# Patient Record
Sex: FEMALE | Race: WHITE | NOT HISPANIC OR LATINO | Employment: OTHER | URBAN - METROPOLITAN AREA
[De-identification: names, ages, dates, MRNs, and addresses within clinical notes are randomized per-mention and may not be internally consistent; named-entity substitution may affect disease eponyms.]

---

## 2018-04-17 ENCOUNTER — HISTORICAL (OUTPATIENT)
Dept: ADMINISTRATIVE | Facility: HOSPITAL | Age: 58
End: 2018-04-17

## 2018-04-17 LAB
ALBUMIN SERPL-MCNC: 3.9 GM/DL (ref 3.4–5)
ALBUMIN/GLOB SERPL: 1.4 {RATIO}
ALP SERPL-CCNC: 50 UNIT/L (ref 38–126)
ALT SERPL-CCNC: 21 UNIT/L (ref 12–78)
AST SERPL-CCNC: 11 UNIT/L (ref 15–37)
BILIRUB SERPL-MCNC: 0.3 MG/DL (ref 0.2–1)
BILIRUBIN DIRECT+TOT PNL SERPL-MCNC: 0.1 MG/DL (ref 0–0.2)
BILIRUBIN DIRECT+TOT PNL SERPL-MCNC: 0.2 MG/DL (ref 0–0.8)
BUN SERPL-MCNC: 20 MG/DL (ref 7–18)
CALCIUM SERPL-MCNC: 8.9 MG/DL (ref 8.5–10.1)
CHLORIDE SERPL-SCNC: 108 MMOL/L (ref 98–107)
CHOLEST SERPL-MCNC: 183 MG/DL (ref 0–200)
CHOLEST/HDLC SERPL: 2.5 {RATIO} (ref 0–4)
CO2 SERPL-SCNC: 29 MMOL/L (ref 21–32)
CREAT SERPL-MCNC: 0.8 MG/DL (ref 0.55–1.02)
DEPRECATED CALCIDIOL+CALCIFEROL SERPL-MC: 42 NG/ML (ref 30–80)
ERYTHROCYTE [DISTWIDTH] IN BLOOD BY AUTOMATED COUNT: 12.4 % (ref 11.5–17)
EST. AVERAGE GLUCOSE BLD GHB EST-MCNC: 108 MG/DL
FT4I SERPL CALC-MCNC: 2.94 (ref 2.6–3.6)
GLOBULIN SER-MCNC: 2.7 GM/DL (ref 2.4–3.5)
GLUCOSE SERPL-MCNC: 82 MG/DL (ref 74–106)
HBA1C MFR BLD: 5.4 % (ref 4.2–6.3)
HCT VFR BLD AUTO: 43.4 % (ref 37–47)
HDLC SERPL-MCNC: 73 MG/DL (ref 35–60)
HGB BLD-MCNC: 14.2 GM/DL (ref 12–16)
LDLC SERPL CALC-MCNC: 99 MG/DL (ref 0–129)
MCH RBC QN AUTO: 33.3 PG (ref 27–31)
MCHC RBC AUTO-ENTMCNC: 32.7 GM/DL (ref 33–36)
MCV RBC AUTO: 101.6 FL (ref 80–94)
PLATELET # BLD AUTO: 226 X10(3)/MCL (ref 130–400)
PMV BLD AUTO: 9.9 FL (ref 9.4–12.4)
POTASSIUM SERPL-SCNC: 4.5 MMOL/L (ref 3.5–5.1)
PROT SERPL-MCNC: 6.6 GM/DL (ref 6.4–8.2)
RBC # BLD AUTO: 4.27 X10(6)/MCL (ref 4.2–5.4)
SODIUM SERPL-SCNC: 141 MMOL/L (ref 136–145)
T3RU NFR SERPL: 31 % (ref 31–39)
T4 SERPL-MCNC: 9.5 MCG/DL (ref 4.7–13.3)
TRIGL SERPL-MCNC: 54 MG/DL (ref 30–150)
TSH SERPL-ACNC: 1.21 MIU/ML (ref 0.36–3.74)
VLDLC SERPL CALC-MCNC: 11 MG/DL
WBC # SPEC AUTO: 3.1 X10(3)/MCL (ref 4.5–11.5)

## 2018-07-27 ENCOUNTER — HISTORICAL (OUTPATIENT)
Dept: CARDIOLOGY | Facility: HOSPITAL | Age: 58
End: 2018-07-27

## 2018-07-27 LAB
ABS NEUT (OLG): 2.46 X10(3)/MCL (ref 2.1–9.2)
ALBUMIN SERPL-MCNC: 3.6 GM/DL (ref 3.4–5)
ALBUMIN/GLOB SERPL: 1.2 {RATIO}
ALP SERPL-CCNC: 61 UNIT/L (ref 38–126)
ALT SERPL-CCNC: 16 UNIT/L (ref 12–78)
AST SERPL-CCNC: 11 UNIT/L (ref 15–37)
BASOPHILS NFR BLD MANUAL: 2 % (ref 0–2)
BILIRUB SERPL-MCNC: 0.4 MG/DL (ref 0.2–1)
BILIRUBIN DIRECT+TOT PNL SERPL-MCNC: 0.1 MG/DL (ref 0–0.2)
BILIRUBIN DIRECT+TOT PNL SERPL-MCNC: 0.3 MG/DL (ref 0–0.8)
BUN SERPL-MCNC: 20 MG/DL (ref 7–18)
CALCIUM SERPL-MCNC: 8.5 MG/DL (ref 8.5–10.1)
CHLORIDE SERPL-SCNC: 108 MMOL/L (ref 98–107)
CHOLEST SERPL-MCNC: 166 MG/DL (ref 0–200)
CHOLEST/HDLC SERPL: 3 {RATIO} (ref 0–4)
CO2 SERPL-SCNC: 27 MMOL/L (ref 21–32)
CREAT SERPL-MCNC: 1 MG/DL (ref 0.55–1.02)
EOSINOPHIL NFR BLD MANUAL: 38 % (ref 0–8)
ERYTHROCYTE [DISTWIDTH] IN BLOOD BY AUTOMATED COUNT: 12 % (ref 11.5–17)
EST. AVERAGE GLUCOSE BLD GHB EST-MCNC: 105 MG/DL
GLOBULIN SER-MCNC: 3 GM/DL (ref 2.4–3.5)
GLUCOSE SERPL-MCNC: 83 MG/DL (ref 74–106)
HBA1C MFR BLD: 5.3 % (ref 4.2–6.3)
HCT VFR BLD AUTO: 42.8 % (ref 37–47)
HDLC SERPL-MCNC: 55 MG/DL (ref 35–60)
HGB BLD-MCNC: 14 GM/DL (ref 12–16)
INR PPP: 1.02 (ref 0–1.27)
LDLC SERPL CALC-MCNC: 99 MG/DL (ref 0–129)
LYMPHOCYTES NFR BLD MANUAL: 21 % (ref 13–40)
MAGNESIUM SERPL-MCNC: 2.1 MG/DL (ref 1.8–2.4)
MCH RBC QN AUTO: 32.8 PG (ref 27–31)
MCHC RBC AUTO-ENTMCNC: 32.7 GM/DL (ref 33–36)
MCV RBC AUTO: 100.2 FL (ref 80–94)
MONOCYTES NFR BLD MANUAL: 3 % (ref 2–11)
NEUTROPHILS NFR BLD MANUAL: 36 % (ref 47–80)
PLATELET # BLD AUTO: 195 X10(3)/MCL (ref 130–400)
PLATELET # BLD EST: NORMAL 10*3/UL
PLATELET # BLD EST: NORMAL 10*3/UL
PMV BLD AUTO: 9.7 FL (ref 7.4–10.4)
POTASSIUM SERPL-SCNC: 4.5 MMOL/L (ref 3.5–5.1)
PROT SERPL-MCNC: 6.6 GM/DL (ref 6.4–8.2)
PROTHROMBIN TIME: 13.7 SECOND(S) (ref 12.2–14.7)
RBC # BLD AUTO: 4.27 X10(6)/MCL (ref 4.2–5.4)
RBC MORPH BLD: NORMAL
SODIUM SERPL-SCNC: 143 MMOL/L (ref 136–145)
TRIGL SERPL-MCNC: 59 MG/DL (ref 30–150)
TSH SERPL-ACNC: 1.32 MIU/L (ref 0.36–3.74)
VLDLC SERPL CALC-MCNC: 12 MG/DL
WBC # SPEC AUTO: 6.9 X10(3)/MCL (ref 4.5–11.5)

## 2018-08-10 ENCOUNTER — HISTORICAL (OUTPATIENT)
Dept: CARDIOLOGY | Facility: HOSPITAL | Age: 58
End: 2018-08-10

## 2018-08-10 LAB
ABS NEUT (OLG): 1.37 X10(3)/MCL (ref 2.1–9.2)
ALBUMIN SERPL-MCNC: 3.6 GM/DL (ref 3.4–5)
ALBUMIN/GLOB SERPL: 1.3 {RATIO}
ALP SERPL-CCNC: 56 UNIT/L (ref 38–126)
ALT SERPL-CCNC: 18 UNIT/L (ref 12–78)
AST SERPL-CCNC: 11 UNIT/L (ref 15–37)
BILIRUB SERPL-MCNC: 0.4 MG/DL (ref 0.2–1)
BILIRUBIN DIRECT+TOT PNL SERPL-MCNC: 0.1 MG/DL (ref 0–0.2)
BILIRUBIN DIRECT+TOT PNL SERPL-MCNC: 0.3 MG/DL (ref 0–0.8)
BUN SERPL-MCNC: 14 MG/DL (ref 7–18)
CALCIUM SERPL-MCNC: 8.7 MG/DL (ref 8.5–10.1)
CHLORIDE SERPL-SCNC: 107 MMOL/L (ref 98–107)
CHOLEST SERPL-MCNC: 164 MG/DL (ref 0–200)
CHOLEST/HDLC SERPL: 3 {RATIO} (ref 0–4)
CO2 SERPL-SCNC: 29 MMOL/L (ref 21–32)
CREAT SERPL-MCNC: 0.82 MG/DL (ref 0.55–1.02)
EOSINOPHIL NFR BLD MANUAL: 19 % (ref 0–8)
ERYTHROCYTE [DISTWIDTH] IN BLOOD BY AUTOMATED COUNT: 12.4 % (ref 11.5–17)
EST. AVERAGE GLUCOSE BLD GHB EST-MCNC: 108 MG/DL
GLOBULIN SER-MCNC: 2.7 GM/DL (ref 2.4–3.5)
GLUCOSE SERPL-MCNC: 92 MG/DL (ref 74–106)
HBA1C MFR BLD: 5.4 % (ref 4.2–6.3)
HCT VFR BLD AUTO: 40.9 % (ref 37–47)
HDLC SERPL-MCNC: 54 MG/DL (ref 35–60)
HGB BLD-MCNC: 13.3 GM/DL (ref 12–16)
INR PPP: 1.01 (ref 0–1.27)
LDLC SERPL CALC-MCNC: 99 MG/DL (ref 0–129)
LYMPHOCYTES NFR BLD MANUAL: 40 % (ref 13–40)
MACROCYTES BLD QL SMEAR: 1
MAGNESIUM SERPL-MCNC: 2.1 MG/DL (ref 1.8–2.4)
MCH RBC QN AUTO: 32.8 PG (ref 27–31)
MCHC RBC AUTO-ENTMCNC: 32.5 GM/DL (ref 33–36)
MCV RBC AUTO: 101 FL (ref 80–94)
MONOCYTES NFR BLD MANUAL: 4 % (ref 2–11)
NEUTROPHILS NFR BLD MANUAL: 37 % (ref 47–80)
PLATELET # BLD AUTO: 228 X10(3)/MCL (ref 130–400)
PLATELET # BLD EST: ADEQUATE 10*3/UL
PLATELET # BLD EST: ADEQUATE 10*3/UL
PMV BLD AUTO: 9.4 FL (ref 7.4–10.4)
POTASSIUM SERPL-SCNC: 4.6 MMOL/L (ref 3.5–5.1)
PROT SERPL-MCNC: 6.3 GM/DL (ref 6.4–8.2)
PROTHROMBIN TIME: 13.6 SECOND(S) (ref 12.2–14.7)
RBC # BLD AUTO: 4.05 X10(6)/MCL (ref 4.2–5.4)
SODIUM SERPL-SCNC: 142 MMOL/L (ref 136–145)
TRIGL SERPL-MCNC: 53 MG/DL (ref 30–150)
TSH SERPL-ACNC: 1.15 MIU/L (ref 0.36–3.74)
VLDLC SERPL CALC-MCNC: 11 MG/DL
WBC # SPEC AUTO: 4.5 X10(3)/MCL (ref 4.5–11.5)

## 2018-08-21 ENCOUNTER — HISTORICAL (OUTPATIENT)
Dept: ADMINISTRATIVE | Facility: HOSPITAL | Age: 58
End: 2018-08-21

## 2018-08-21 LAB
ABS NEUT (OLG): 4.16 X10(3)/MCL (ref 2.1–9.2)
ALBUMIN SERPL-MCNC: 4.3 GM/DL (ref 3.4–5)
ALBUMIN/GLOB SERPL: 1.3 RATIO (ref 1.1–2)
ALP SERPL-CCNC: 79 UNIT/L (ref 38–126)
ALT SERPL-CCNC: 28 UNIT/L (ref 12–78)
AST SERPL-CCNC: 12 UNIT/L (ref 15–37)
BASOPHILS # BLD AUTO: 0 X10(3)/MCL (ref 0–0.2)
BASOPHILS NFR BLD AUTO: 0 %
BILIRUB SERPL-MCNC: 0.6 MG/DL (ref 0.2–1)
BILIRUBIN DIRECT+TOT PNL SERPL-MCNC: 0.2 MG/DL (ref 0–0.5)
BILIRUBIN DIRECT+TOT PNL SERPL-MCNC: 0.4 MG/DL (ref 0–0.8)
BUN SERPL-MCNC: 21 MG/DL (ref 7–18)
CALCIUM SERPL-MCNC: 9.3 MG/DL (ref 8.5–10.1)
CHLORIDE SERPL-SCNC: 102 MMOL/L (ref 98–107)
CO2 SERPL-SCNC: 27 MMOL/L (ref 21–32)
CREAT SERPL-MCNC: 1.09 MG/DL (ref 0.55–1.02)
EOSINOPHIL # BLD AUTO: 0.5 X10(3)/MCL (ref 0–0.9)
EOSINOPHIL NFR BLD AUTO: 7 %
ERYTHROCYTE [DISTWIDTH] IN BLOOD BY AUTOMATED COUNT: 12.2 % (ref 11.5–17)
FERRITIN SERPL-MCNC: 195.9 NG/ML (ref 8–388)
GLOBULIN SER-MCNC: 3.4 GM/DL (ref 2.4–3.5)
GLUCOSE SERPL-MCNC: 69 MG/DL (ref 74–106)
HCT VFR BLD AUTO: 45.9 % (ref 37–47)
HGB BLD-MCNC: 15 GM/DL (ref 12–16)
IRON SATN MFR SERPL: 36.1 % (ref 20–50)
IRON SERPL-MCNC: 113 MCG/DL (ref 50–175)
LYMPHOCYTES # BLD AUTO: 1.6 X10(3)/MCL (ref 0.6–4.6)
LYMPHOCYTES NFR BLD AUTO: 24 %
MCH RBC QN AUTO: 33 PG (ref 27–31)
MCHC RBC AUTO-ENTMCNC: 32.7 GM/DL (ref 33–36)
MCV RBC AUTO: 101.1 FL (ref 80–94)
MONOCYTES # BLD AUTO: 0.3 X10(3)/MCL (ref 0.1–1.3)
MONOCYTES NFR BLD AUTO: 5 %
NEUTROPHILS # BLD AUTO: 4.16 X10(3)/MCL (ref 1.4–7.9)
NEUTROPHILS NFR BLD AUTO: 63 %
PLATELET # BLD AUTO: 310 X10(3)/MCL (ref 130–400)
PMV BLD AUTO: 9.3 FL (ref 9.4–12.4)
POTASSIUM SERPL-SCNC: 4.6 MMOL/L (ref 3.5–5.1)
PROT SERPL-MCNC: 7.7 GM/DL (ref 6.4–8.2)
RBC # BLD AUTO: 4.54 X10(6)/MCL (ref 4.2–5.4)
SODIUM SERPL-SCNC: 138 MMOL/L (ref 136–145)
TIBC SERPL-MCNC: 313 MCG/DL (ref 250–450)
TRANSFERRIN SERPL-MCNC: 261 MG/DL (ref 200–360)
WBC # SPEC AUTO: 6.6 X10(3)/MCL (ref 4.5–11.5)

## 2021-03-23 ENCOUNTER — HISTORICAL (OUTPATIENT)
Dept: ADMINISTRATIVE | Facility: HOSPITAL | Age: 61
End: 2021-03-23

## 2021-03-23 LAB
ABS NEUT (OLG): 1.51 X10(3)/MCL (ref 2.1–9.2)
ALBUMIN SERPL-MCNC: 3.9 GM/DL (ref 3.4–4.8)
ALBUMIN/GLOB SERPL: 1.6 RATIO (ref 1.1–2)
ALP SERPL-CCNC: 84 UNIT/L (ref 40–150)
ALT SERPL-CCNC: 16 UNIT/L (ref 0–55)
AST SERPL-CCNC: 14 UNIT/L (ref 5–34)
BASOPHILS # BLD AUTO: 0 X10(3)/MCL (ref 0–0.2)
BASOPHILS NFR BLD AUTO: 1 %
BILIRUB SERPL-MCNC: 0.5 MG/DL
BILIRUBIN DIRECT+TOT PNL SERPL-MCNC: 0.2 MG/DL (ref 0–0.5)
BILIRUBIN DIRECT+TOT PNL SERPL-MCNC: 0.3 MG/DL (ref 0–0.8)
BUN SERPL-MCNC: 14.6 MG/DL (ref 9.8–20.1)
CALCIUM SERPL-MCNC: 8.9 MG/DL (ref 8.4–10.2)
CHLORIDE SERPL-SCNC: 107 MMOL/L (ref 98–107)
CO2 SERPL-SCNC: 28 MMOL/L (ref 23–31)
CREAT SERPL-MCNC: 0.84 MG/DL (ref 0.55–1.02)
CRP SERPL-MCNC: 0.24 MG/DL
EOSINOPHIL # BLD AUTO: 0.1 X10(3)/MCL (ref 0–0.9)
EOSINOPHIL NFR BLD AUTO: 3 %
ERYTHROCYTE [DISTWIDTH] IN BLOOD BY AUTOMATED COUNT: 13.3 % (ref 11.5–17)
ERYTHROCYTE [SEDIMENTATION RATE] IN BLOOD: 16 MM/HR (ref 0–20)
GLOBULIN SER-MCNC: 2.4 GM/DL (ref 2.4–3.5)
GLUCOSE SERPL-MCNC: 84 MG/DL (ref 82–115)
HCT VFR BLD AUTO: 41.8 % (ref 37–47)
HGB BLD-MCNC: 13.2 GM/DL (ref 12–16)
HIV 1+2 AB+HIV1 P24 AG SERPL QL IA: NONREACTIVE
IRON SERPL-MCNC: 150 UG/DL (ref 50–170)
LYMPHOCYTES # BLD AUTO: 1.1 X10(3)/MCL (ref 0.6–4.6)
LYMPHOCYTES NFR BLD AUTO: 36 %
MCH RBC QN AUTO: 31.1 PG (ref 27–31)
MCHC RBC AUTO-ENTMCNC: 31.6 GM/DL (ref 33–36)
MCV RBC AUTO: 98.6 FL (ref 80–94)
MONOCYTES # BLD AUTO: 0.3 X10(3)/MCL (ref 0.1–1.3)
MONOCYTES NFR BLD AUTO: 11 %
NEUTROPHILS # BLD AUTO: 1.51 X10(3)/MCL (ref 2.1–9.2)
NEUTROPHILS NFR BLD AUTO: 49 %
PLATELET # BLD AUTO: 273 X10(3)/MCL (ref 130–400)
PMV BLD AUTO: 10.3 FL (ref 9.4–12.4)
POTASSIUM SERPL-SCNC: 4 MMOL/L (ref 3.5–5.1)
PROT SERPL-MCNC: 6.3 GM/DL (ref 5.8–7.6)
RBC # BLD AUTO: 4.24 X10(6)/MCL (ref 4.2–5.4)
SODIUM SERPL-SCNC: 143 MMOL/L (ref 136–145)
T PALLIDUM AB SER QL: NONREACTIVE
T3FREE SERPL-MCNC: 2.7 PG/ML (ref 1.71–3.71)
T4 FREE SERPL-MCNC: 0.98 NG/DL (ref 0.7–1.48)
TSH SERPL-ACNC: 1.1 UIU/ML (ref 0.35–4.94)
WBC # SPEC AUTO: 3.1 X10(3)/MCL (ref 4.5–11.5)

## 2021-03-24 ENCOUNTER — HISTORICAL (OUTPATIENT)
Dept: ADMINISTRATIVE | Facility: HOSPITAL | Age: 61
End: 2021-03-24

## 2021-04-01 ENCOUNTER — HISTORICAL (OUTPATIENT)
Dept: ADMINISTRATIVE | Facility: HOSPITAL | Age: 61
End: 2021-04-01

## 2021-04-01 LAB
APPEARANCE, UA: CLEAR
BACTERIA SPEC CULT: ABNORMAL /HPF
BILIRUB UR QL STRIP: NEGATIVE
COLOR UR: YELLOW
GLUCOSE (UA): NEGATIVE
HGB UR QL STRIP: NEGATIVE
KETONES UR QL STRIP: NEGATIVE
LEUKOCYTE ESTERASE UR QL STRIP: ABNORMAL
NITRITE UR QL STRIP: NEGATIVE
PH UR STRIP: 5 [PH] (ref 5–9)
PROT UR QL STRIP: NEGATIVE
RBC #/AREA URNS HPF: ABNORMAL /[HPF]
SP GR UR STRIP: 1.02 (ref 1–1.03)
SQUAMOUS EPITHELIAL, UA: ABNORMAL /HPF (ref 0–4)
UROBILINOGEN UR STRIP-ACNC: 0.2
WBC #/AREA URNS HPF: 11 /HPF (ref 0–3)

## 2021-04-20 ENCOUNTER — HISTORICAL (OUTPATIENT)
Dept: RADIOLOGY | Facility: HOSPITAL | Age: 61
End: 2021-04-20

## 2021-05-10 ENCOUNTER — HISTORICAL (OUTPATIENT)
Dept: ADMINISTRATIVE | Facility: HOSPITAL | Age: 61
End: 2021-05-10

## 2021-05-10 LAB
ABS NEUT (OLG): 7.34 X10(3)/MCL (ref 2.1–9.2)
ALBUMIN SERPL-MCNC: 3.8 GM/DL (ref 3.4–4.8)
ALBUMIN/GLOB SERPL: 1.5 RATIO (ref 1.1–2)
ALP SERPL-CCNC: 99 UNIT/L (ref 40–150)
ALT SERPL-CCNC: 16 UNIT/L (ref 0–55)
AST SERPL-CCNC: 13 UNIT/L (ref 5–34)
BASOPHILS # BLD AUTO: 0 X10(3)/MCL (ref 0–0.2)
BASOPHILS NFR BLD AUTO: 0 %
BILIRUB SERPL-MCNC: 0.4 MG/DL
BILIRUBIN DIRECT+TOT PNL SERPL-MCNC: 0.2 MG/DL (ref 0–0.5)
BILIRUBIN DIRECT+TOT PNL SERPL-MCNC: 0.2 MG/DL (ref 0–0.8)
BUN SERPL-MCNC: 15.3 MG/DL (ref 9.8–20.1)
CALCIUM SERPL-MCNC: 9.2 MG/DL (ref 8.4–10.2)
CHLORIDE SERPL-SCNC: 106 MMOL/L (ref 98–107)
CO2 SERPL-SCNC: 25 MMOL/L (ref 23–31)
CREAT SERPL-MCNC: 1 MG/DL (ref 0.55–1.02)
EOSINOPHIL # BLD AUTO: 0 X10(3)/MCL (ref 0–0.9)
EOSINOPHIL NFR BLD AUTO: 0 %
ERYTHROCYTE [DISTWIDTH] IN BLOOD BY AUTOMATED COUNT: 13 % (ref 11.5–17)
GLOBULIN SER-MCNC: 2.5 GM/DL (ref 2.4–3.5)
GLUCOSE SERPL-MCNC: 120 MG/DL (ref 82–115)
HCT VFR BLD AUTO: 40.3 % (ref 37–47)
HGB BLD-MCNC: 13 GM/DL (ref 12–16)
LIPASE SERPL-CCNC: 14 U/L
LYMPHOCYTES # BLD AUTO: 0.9 X10(3)/MCL (ref 0.6–4.6)
LYMPHOCYTES NFR BLD AUTO: 10 %
MCH RBC QN AUTO: 31.5 PG (ref 27–31)
MCHC RBC AUTO-ENTMCNC: 32.3 GM/DL (ref 33–36)
MCV RBC AUTO: 97.6 FL (ref 80–94)
MONOCYTES # BLD AUTO: 0.4 X10(3)/MCL (ref 0.1–1.3)
MONOCYTES NFR BLD AUTO: 4 %
NEUTROPHILS # BLD AUTO: 7.34 X10(3)/MCL (ref 2.1–9.2)
NEUTROPHILS NFR BLD AUTO: 84 %
PLATELET # BLD AUTO: 289 X10(3)/MCL (ref 130–400)
PMV BLD AUTO: 10.5 FL (ref 9.4–12.4)
POTASSIUM SERPL-SCNC: 4.1 MMOL/L (ref 3.5–5.1)
PROT SERPL-MCNC: 6.3 GM/DL (ref 5.8–7.6)
RBC # BLD AUTO: 4.13 X10(6)/MCL (ref 4.2–5.4)
SODIUM SERPL-SCNC: 140 MMOL/L (ref 136–145)
TROPONIN I SERPL-MCNC: <0.01 NG/ML (ref 0–0.04)
WBC # SPEC AUTO: 8.7 X10(3)/MCL (ref 4.5–11.5)

## 2021-05-11 ENCOUNTER — HOSPITAL ENCOUNTER (OUTPATIENT)
Dept: MEDSURG UNIT | Facility: HOSPITAL | Age: 61
End: 2021-05-12
Attending: COLON & RECTAL SURGERY | Admitting: COLON & RECTAL SURGERY

## 2021-05-11 LAB
ALBUMIN SERPL-MCNC: 3.5 GM/DL (ref 3.4–4.8)
ALBUMIN/GLOB SERPL: 1.2 RATIO (ref 1.1–2)
ALP SERPL-CCNC: 81 UNIT/L (ref 40–150)
ALT SERPL-CCNC: 16 UNIT/L (ref 0–55)
AST SERPL-CCNC: 17 UNIT/L (ref 5–34)
BILIRUB SERPL-MCNC: 0.7 MG/DL
BILIRUBIN DIRECT+TOT PNL SERPL-MCNC: 0.2 MG/DL (ref 0–0.5)
BILIRUBIN DIRECT+TOT PNL SERPL-MCNC: 0.5 MG/DL (ref 0–0.8)
BUN SERPL-MCNC: 8.5 MG/DL (ref 9.8–20.1)
CALCIUM SERPL-MCNC: 9 MG/DL (ref 8.4–10.2)
CHLORIDE SERPL-SCNC: 103 MMOL/L (ref 98–107)
CO2 SERPL-SCNC: 23 MMOL/L (ref 23–31)
CREAT SERPL-MCNC: 0.76 MG/DL (ref 0.55–1.02)
GLOBULIN SER-MCNC: 3 GM/DL (ref 2.4–3.5)
GLUCOSE SERPL-MCNC: 155 MG/DL (ref 82–115)
LIPASE SERPL-CCNC: 12 U/L
POTASSIUM SERPL-SCNC: 3.6 MMOL/L (ref 3.5–5.1)
PROT SERPL-MCNC: 6.5 GM/DL (ref 5.8–7.6)
SARS-COV-2 AG RESP QL IA.RAPID: NEGATIVE
SODIUM SERPL-SCNC: 135 MMOL/L (ref 136–145)

## 2021-05-12 LAB
ABS NEUT (OLG): 4.79 X10(3)/MCL (ref 2.1–9.2)
ALBUMIN SERPL-MCNC: 3 GM/DL (ref 3.4–4.8)
ALBUMIN/GLOB SERPL: 1.4 RATIO (ref 1.1–2)
ALP SERPL-CCNC: 89 UNIT/L (ref 40–150)
ALT SERPL-CCNC: 126 UNIT/L (ref 0–55)
AST SERPL-CCNC: 84 UNIT/L (ref 5–34)
BASOPHILS # BLD AUTO: 0 X10(3)/MCL (ref 0–0.2)
BASOPHILS NFR BLD AUTO: 0 %
BILIRUB SERPL-MCNC: 0.3 MG/DL
BILIRUBIN DIRECT+TOT PNL SERPL-MCNC: 0.1 MG/DL (ref 0–0.8)
BILIRUBIN DIRECT+TOT PNL SERPL-MCNC: 0.2 MG/DL (ref 0–0.5)
BUN SERPL-MCNC: 8.8 MG/DL (ref 9.8–20.1)
CALCIUM SERPL-MCNC: 8.2 MG/DL (ref 8.4–10.2)
CHLORIDE SERPL-SCNC: 108 MMOL/L (ref 98–107)
CO2 SERPL-SCNC: 29 MMOL/L (ref 23–31)
CREAT SERPL-MCNC: 0.78 MG/DL (ref 0.55–1.02)
EOSINOPHIL # BLD AUTO: 0.1 X10(3)/MCL (ref 0–0.9)
EOSINOPHIL NFR BLD AUTO: 1 %
ERYTHROCYTE [DISTWIDTH] IN BLOOD BY AUTOMATED COUNT: 13.5 % (ref 11.5–17)
GLOBULIN SER-MCNC: 2.1 GM/DL (ref 2.4–3.5)
GLUCOSE SERPL-MCNC: 100 MG/DL (ref 82–115)
HCT VFR BLD AUTO: 35.4 % (ref 37–47)
HGB BLD-MCNC: 11.4 GM/DL (ref 12–16)
LYMPHOCYTES # BLD AUTO: 1.5 X10(3)/MCL (ref 0.6–4.6)
LYMPHOCYTES NFR BLD AUTO: 22 %
MCH RBC QN AUTO: 31.8 PG (ref 27–31)
MCHC RBC AUTO-ENTMCNC: 32.2 GM/DL (ref 33–36)
MCV RBC AUTO: 98.9 FL (ref 80–94)
MONOCYTES # BLD AUTO: 0.5 X10(3)/MCL (ref 0.1–1.3)
MONOCYTES NFR BLD AUTO: 8 %
NEUTROPHILS # BLD AUTO: 4.79 X10(3)/MCL (ref 2.1–9.2)
NEUTROPHILS NFR BLD AUTO: 69 %
PLATELET # BLD AUTO: 224 X10(3)/MCL (ref 130–400)
PMV BLD AUTO: 10.4 FL (ref 9.4–12.4)
POTASSIUM SERPL-SCNC: 3.4 MMOL/L (ref 3.5–5.1)
PROT SERPL-MCNC: 5.1 GM/DL (ref 5.8–7.6)
RBC # BLD AUTO: 3.58 X10(6)/MCL (ref 4.2–5.4)
SODIUM SERPL-SCNC: 140 MMOL/L (ref 136–145)
WBC # SPEC AUTO: 7 X10(3)/MCL (ref 4.5–11.5)

## 2021-06-16 ENCOUNTER — HISTORICAL (OUTPATIENT)
Dept: ADMINISTRATIVE | Facility: HOSPITAL | Age: 61
End: 2021-06-16

## 2021-06-16 LAB
CHOLEST SERPL-MCNC: 237 MG/DL
CHOLEST/HDLC SERPL: 4 {RATIO} (ref 0–5)
DEPRECATED CALCIDIOL+CALCIFEROL SERPL-MC: 40.5 NG/ML (ref 30–80)
FOLATE SERPL-MCNC: 5.6 NG/ML (ref 7–31.4)
HDLC SERPL-MCNC: 60 MG/DL (ref 35–60)
LDLC SERPL CALC-MCNC: 152 MG/DL (ref 50–140)
TRIGL SERPL-MCNC: 127 MG/DL (ref 37–140)
VIT B12 SERPL-MCNC: 369 PG/ML (ref 213–816)
VLDLC SERPL CALC-MCNC: 25 MG/DL

## 2021-09-09 ENCOUNTER — HISTORICAL (OUTPATIENT)
Dept: ADMINISTRATIVE | Facility: HOSPITAL | Age: 61
End: 2021-09-09

## 2021-09-09 LAB
FOLATE SERPL-MCNC: 12.9 NG/ML (ref 7–31.4)
VIT B12 SERPL-MCNC: >2000 PG/ML (ref 213–816)

## 2022-04-07 ENCOUNTER — HISTORICAL (OUTPATIENT)
Dept: ADMINISTRATIVE | Facility: HOSPITAL | Age: 62
End: 2022-04-07
Payer: COMMERCIAL

## 2022-04-24 VITALS
OXYGEN SATURATION: 97 % | HEIGHT: 66 IN | BODY MASS INDEX: 31.57 KG/M2 | DIASTOLIC BLOOD PRESSURE: 82 MMHG | WEIGHT: 196.44 LBS | SYSTOLIC BLOOD PRESSURE: 118 MMHG

## 2022-04-30 NOTE — DISCHARGE SUMMARY
DISCHARGE DATE:  07/27/2018    HOSPITAL COURSE:  Mrs. Cunningham was admitted for elective bilateral lower extremity venography with intravascular ultrasonography to further evaluate her for evidence of iliac vein compression syndrome.  As outlined in the operative report, she underwent an uneventful bilateral lower extremity venography procedure with intravascular ultrasonography, and was found to have significant ostial left common iliac vein compression, and only mild to moderate bilateral external iliac vein compression.  Postoperative course is being monitored closely in preparation for discharge home later today.    DISCHARGE DIAGNOSES:    1. 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  2. Mild to moderate bilateral external iliac vein compression.  3. Chronic venous hypertension.  4. Venous intermittent claudication.  5. Edema.  6. Chronic venous insufficiency CEAP classification 3/4.  7. Symptomatic varicose veins and spider veins of the bilateral lower extremities.    RECOMMENDATIONS:  Mrs. Cunningham will be arranged for discharge home today. In the meantime, will be continuing on the same medications as on admission, and I have asked her to call or return should she have further problems or complaints.  I will be checking on her progress in my office in the very near future.  We will continue to monitor her progress closely and titrate medical therapies as tolerated.  We will also be discussing a conservative approach versus simultaneous common iliac vein percutaneous transluminal venoplasty.  With the severity of the compression of the ostium of the left common iliac vein, stenting of this lesion would be suboptimal without simultaneous stenting of the right common iliac vein.  Will have further discussions in the office and further recommendations forthcoming.        ______________________________  Boris Dubois DO    CT/US  DD:  07/27/2018  Time:  09:21AM  DT:  07/27/2018  Time:  09:45AM  Job #:   [id number]

## 2022-04-30 NOTE — OP NOTE
DATE OF SURGERY:        SURGEON:  Boris Dubois DO    PREOPERATIVE DIAGNOSES:    1. 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  2. Moderate right iliac vein compression.  3. Chronic peripheral edema.  4. Chronic venous hypertension.  5. Venous intermittent claudication of the bilateral extremities.    POSTOPERATIVE DIAGNOSES:    1. 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  2. Moderate right iliac vein compression.  3. Chronic peripheral edema.  4. Chronic venous hypertension.  5. Venous intermittent claudication of the bilateral extremities.    PROCEDURE:    1. Selective bilateral lower extremity venography with venography of the inferior vena cava.  2. Simultaneous bilateral common iliac vein stenting.    COMPLICATIONS:  None.    ESTIMATED BLOOD LOSS:  None.    CONTRAST UTILIZED:  Approximately 20 cc.    HEPARIN USE:  4200 cc.    DESCRIPTION OF PROCEDURE:  Mrs. Cunningham was brought to the cardiac catheterization laboratory for the above-outlined procedures.  Informed consent was obtained.  Based on a recent bilateral extremity venogram with intravascular ultrasonography, the patient was known to have severe ostial left common iliac vein compression in the range of 80% and probable moderately severe right iliac vein compression in the range of 48%.  Repeat bilateral lower extremity venography confirmed the location of the bilateral iliac vein compression.  Generous collateral flow was seen to traverse around the ostial left common iliac vein lesion and across to the right common iliac vein.  The inferior vena cava was otherwise widely patent.  Both iliac veins underwent balloon angioplasty using a 16 x 60 mm balloon.  We then successfully and simultaneously deployed 20 x 80 mm Wallstents across the ostia of the bilateral common iliac veins and extending into the distal inferior vena cava.  The stents were post dilated with a 16 mm balloon and subsequent venograms revealed widely patent iliac vein  stents and inferior vena cava.  With this, the procedure was completed successfully.  The patient tolerated the procedure rather well and left the cardiac catheterization laboratory in stable condition.        ______________________________  DO ERMA Donovan/UY  DD:  08/10/2018  Time:  05:34PM  DT:  08/10/2018  Time:  08:57PM  Job #:  [id number]    cc: Boris Dubois DO

## 2022-04-30 NOTE — DISCHARGE SUMMARY
DISCHARGE DATE:  08/10/2018    HOSPITAL COURSE:  Ms Cunningham was admitted for further evaluation and management of known significant bilateral iliac vein compression noted on recent studies.  As outlined in the operative report, she underwent successful simultaneous percutaneous transluminal venoplasty and stenting of the bilateral common iliac veins extending into the distal inferior vena cava, and the postoperative course so far has been essentially unremarkable and she is being prepared for discharge home later today.    DISCHARGE DIAGNOSES:    1. 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  2. Moderately severe right common iliac vein compression.  3. Status post successful simultaneous bilateral iliac vein stenting into the distal inferior vena cava.    RECOMMENDATIONS:  Mrs. Cunningham will be monitored closely in preparation for discharge home later this evening, if she remains in stable condition.  She will otherwise continue on the same medications as on admission and, in addition, she will be on Plavix and Eliquis daily.  She will be given p.r.n. medications to help with her comfort as she heals from the stent placement.  I have asked her to call or return should she have any further problems or complaints.  In the meantime, to continue on the same medications as we arrange for close outpatient followup evaluation to titrate her medical therapies as tolerated.  Further recommendations forthcoming.        ______________________________  Boris Dubois DO    CT/QAMAR  DD:  08/10/2018  Time:  05:36PM  DT:  08/10/2018  Time:  06:32PM  Job #:  [id number]    cc: Boris Dubois DO

## 2022-04-30 NOTE — ED PROVIDER NOTES
Patient:   Selena Cunningham            MRN: [id number]            FIN: [id number]               Age:   60 years     Sex:  Female     :  1960   Associated Diagnoses:   Acute periumbilical pain   Author:   David Morton      Basic Information   Time seen: Date & time 2021 04:20:00.   History source: Patient.   History limitation: None.   Additional information: Chief Complaint from Nursing Triage Note : Chief Complaint   2021 3:56 CDT       Chief Complaint           Pt. C/o abd pain.. reports seeing pcp yesterday and is scheduled for US mila.. reports pain became worse this monring .. denies n/v/d.. hx of stents    5/10/2021 11:00 CDT      Chief Complaint           pain underneath breast    5/10/2021 10:59 CDT      Chief Complaint           pain underneath breast  .      History of Present Illness   The patient presents with abdominal pain and Epigastric and periumbilical abdominal pain sharp without radiation associated with nausea and vomiting.  She has been constipated the last few time she went to the bathroom.  She did have 2 bowel movements today but states constipated.  Pain started the night after Mother's Day.  That night she had eaten some wings.  She went and saw her primary doctor yesterday had blood work ordered plan was to get an ultrasound of the abdomen today but she has not yet gotten that.  Not take any pain medications currently.  The onset was 1  days ago.  The course/duration of symptoms is fluctuating in intensity.  The character of symptoms is sharp.  The degree at onset was moderate.        Review of Systems   Constitutional symptoms:  Negative except as documented in HPI.   Skin symptoms:  Negative except as documented in HPI.   Eye symptoms:  Negative except as documented in HPI.   ENMT symptoms:  Negative except as documented in HPI.   Respiratory symptoms:  Negative except as documented in HPI.   Cardiovascular symptoms:  Negative except as documented in HPI.    Gastrointestinal symptoms:  Negative except as documented in HPI.   Genitourinary symptoms:  Negative except as documented in HPI.   Musculoskeletal symptoms:  Negative except as documented in HPI.   Neurologic symptoms:  Negative except as documented in HPI.      Health Status   Allergies:    Allergic Reactions (Selected)  Severity Not Documented  Penicillin- Unk.,    Allergies (1) Active Reaction  penicillin unk  .   Medications:  (Selected)   Inpatient Medications  Ordered  Benadryl: 50 mg, form: Cap, Oral, Once, first dose 07/27/18 10:00:00 CDT, stop date 07/27/18 10:00:00 CDT, on call to cath lab  Valium 5 mg oral tablet: 5 mg, form: Tab, Oral, Once, first dose 07/27/18 10:00:00 CDT, stop date 07/27/18 10:00:00 CDT, on call to cath lab  Prescriptions  Prescribed  Bentyl 10 mg oral capsule: 10 mg = 1 cap(s), Oral, QID, PRN PRN cramps, # 40 cap(s), 0 Refill(s), Pharmacy: [facility]/pharmacy #[id number], 165.5, cm, Height/Length Dosing, 05/10/21 11:03:00 CDT, 87.3, kg, Weight Dosing, 05/10/21 11:03:00 CDT  Carafate 1 g oral tablet: 1 gm = 1 tab(s), Oral, QID, # 40 tab(s), 0 Refill(s), Pharmacy: [facility]/pharmacy #[id number], 165.5, cm, Height/Length Dosing, 05/10/21 11:03:00 CDT, 87.3, kg, Weight Dosing, 05/10/21 11:03:00 CDT  Documented Medications  Documented  BUPROPION HCL  MG TB24: Daily  BUPROPION HCL  MG TB24: Daily  D3 1000 (25 mcg) oral tablet: 1,000 IntUnit = 1 tab(s), Oral, Daily, # 30 tab(s), 0 Refill(s)  Dulera 200 mcg-5 mcg/inh inhalation aerosol: 2 puff(s), INH, BID, pt states she takes prn sob, 0 Refill(s)  Pantoprazole 40 mg ORAL EC-Tablet: 40 mg = 1 tab(s), Oral, Daily, # 30 tab(s), 0 Refill(s)  TRINTELLIX 20 MG TABS: Daily  VYVANSE 70 MG CAPS: Daily, 0 Refill(s)  Ventolin HFA 90 mcg/inh inhalation aerosol: pt states she takes prn sob, 0 Refill(s)  amphetamine-dextroamphetamine 20 mg oral capsule, extended release: 20 mg = 1 cap(s), Oral, Once, 0 Refill(s)  clonazePAM 1 mg oral tablet: 1 mg = 1 tab(s),  Oral, TID, 0 Refill(s)  clopidogrel 75 mg oral tablet: 75 mg = 1 tab(s), Oral, Daily, # 30 tab(s), 0 Refill(s)  mirtazapine 15 mg oral tablet: 15 mg = 1 tab(s), Oral, qPM.      Past Medical/ Family/ Social History   Medical history:    Resolved  Asthma (845462099):  Resolved.  GERD - Gastro-esophageal reflux disease (8825227654):  Resolved.  Depression (545283921):  Resolved.  Anxiety (68520786):  Resolved.  ADHD - Attention deficit disorder with hyperactivity (1140093461):  Resolved.  Dementia (29133764):  Resolved..   Surgical history:    hemorrhoid removal.  Rhinoplasty (3268742285).  Tonsillectomy (894996019).  Hysterectomy (419229024).  Appendectomy (756531682)..   Family history:    Diabetes mellitus type 2  Mother  Stroke  Brother  Acute myocardial infarction.  Father  Brother  Congestive heart disease.  Father  Brother  Asthma.  Mother  Hypertension.  Father  Brother  .   Social history:    Social & Psychosocial Habits    Alcohol  07/21/2020  Use: Never    Employment/School  07/21/2020  Status: Disabled    Home/Environment  07/21/2020  Lives with: Children, Spouse    Nutrition/Health  07/21/2020  Home Diet Regular    Sexual  07/21/2020  Sexually active: Yes    Substance Use  07/21/2020  Use: Never    Tobacco  05/10/2021  Use: Former smoker, quit more    Patient Wants Consult For Cessation Counseling No    Started at age: 16 Years    Stopped at age: 50 Years    Abuse/Neglect  05/10/2021  SHX Any signs of abuse or neglect No    Spiritual/Cultural  07/21/2020  Buddhism Preference Nondenominational      07/21/2020  Branch of  Never in   .   Problem list:    Active Problems (8)  Aphthous ulcer of mouth   Asthma   Epigastric pain   Fatigue   GERD - Gastro-esophageal reflux disease   Macrocytosis   Obesity   Recurrent aphthous ulcer   .      Physical Examination               Vital Signs   Vital Signs   5/11/2021 3:56 CDT       Temperature Oral          36.7 DegC                              Temperature Oral (calculated)             98.06 DegF                             Peripheral Pulse Rate     64 bpm                             Respiratory Rate          16 br/min                             SpO2                      100 %                             Systolic Blood Pressure   124 mmHg                             Diastolic Blood Pressure  72 mmHg    5/10/2021 11:00 CDT      Temperature Temporal Artery               36.1 DegC  LOW                             Peripheral Pulse Rate     56 bpm  LOW                             SpO2                      99 %                             Oxygen Therapy            Room air                             Systolic Blood Pressure   150 mmHg  HI                             Diastolic Blood Pressure  96 mmHg  HI                             Blood Pressure Location   Right arm                             Manual Cuff BP            Yes  .      Vital Signs (last 24 hrs)_____  Last Charted___________  Temp Oral     36.7 DegC  (MAY 11 03:56)  Heart Rate Peripheral   64 bpm  (MAY 11 03:56)  Resp Rate         16 br/min  (MAY 11 03:56)  SBP      124 mmHg  (MAY 11 03:56)  DBP      72 mmHg  (MAY 11 03:56)  SpO2      100 %  (MAY 11 03:56)  .   Oxygen saturation.   General:  Alert, moderate distress.    Skin:  Warm, dry.    Head:  Normocephalic.   Eye   Cardiovascular:  Regular rate and rhythm, Normal peripheral perfusion.    Respiratory:  Lungs are clear to auscultation, respirations are non-labored, breath sounds are equal, Symmetrical chest wall expansion.    Gastrointestinal:  Soft, Non distended, Normal bowel sounds, No organomegaly, Pain is epigastric on palpation there is no guarding there is no rebound no pain on deep palpation of the right upper quadrant, Tenderness: Mild, epigastric, Guarding: Negative, Rebound: Negative.    Musculoskeletal:  No deformity.   Neurological:  Alert and oriented to person, place, time, and situation, No focal neurological deficit observed,  normal speech observed.    Psychiatric:  Cooperative, appropriate mood & affect.       Medical Decision Making   Differential Diagnosis:  Abdominal pain, renal stone, ureteral stone, urinary tract infection, pyelonephritis, gastroenteritis, Musculoskeletal pain.    Electrocardiogram:  Time 5/11/2021 04:11:00, rate 72, normal sinus rhythm, EP Interp, Normal axis, Incomplete right bundle branch block.  No change from 3/15/2020.    Radiology results:  Rad Results (ST)  < 12 hrs   Accession: [id number]  Order: CT Abdomen and Pelvis W Contrast  Report Dt/Tm: 05/11/2021 06:07  Report:   CT ABDOMEN AND PELVIS WITH CONTRAST:     Intravenous contrast enhancement and 2-D reformations     HISTORY: Abdominal Pain  As per PQRS measures, all CT scans at this facility used dose  modulation, iterative reconstruction, and/or weight based dose  adjustment when appropriate to reduce radiation dose to as low as  reasonably achievable.     FINDINGS:The liver and spleen normal size. There is mild  pericholecystic fluid or stranding. Pancreas adrenal glands  unremarkable. Kidneys without hydronephrosis or acute finding.     Urinary bladder partially distended unremarkable and there are  presumed surgical changes in the pelvis.     No obstructive bowel findings. Small hiatal hernia likely. No signs of  appendicitis.     Bilateral iliac vein stents noted.     IMPRESSION:  Pericholecystic fluid or stranding likely for which further evaluation  with sonography may be helpful and other details above         .      Impression and Plan   Diagnosis   Acute periumbilical pain (OYM70-TA R10.33)      Calls-Consults   -  5/11/2021 07:44:00 , Surgical Hospitalist, phone call, Will come by to asses patient .    -  5/11/2021 09:11:00 , Surgery, phone call, Will take patient .    Plan   Disposition: Patient care transitioned to: Crys SUTTON, Stoney VILLAREAL    Counseled: Patient, Regarding diagnosis, Regarding diagnostic results, Regarding treatment plan,  Patient indicated understanding of instructions.    Notes:   I, David Morton, acted solely as a scribe for and in the presence of Dr. Spangler who performed the service..

## 2022-04-30 NOTE — OP NOTE
DATE OF SURGERY:    05/11/2021    SURGEON:  Pierre Ramirez MD  ASSISTANT:  Elpidio Spear MD    PREOPERATIVE DIAGNOSIS:  Cholelithiasis with acute cholecystitis.    POSTOPERATIVE DIAGNOSIS:  Cholelithiasis with acute cholecystitis.    PROCEDURE:  Laparoscopic cholecystectomy.    ANESTHESIA:  General.    ESTIMATED BLOOD LOSS:  30 cc.    SPECIMEN:  Gallbladder.    COMPLICATIONS:  None.    PROCEDURE IN DETAIL:  After informed consent was obtained, the patient was brought to the operating room, placed in the supine position.  Next, general endotracheal anesthesia was administered by the anesthesia team.  The abdomen was prepped and draped in sterile surgical fashion.  A small curvilinear supraumbilical incision was made with a 15 blade.  Incision was deepened with electrocautery.  The fascia was divided vertically in the midline with a 15 blade and a 10 mm balloon Ramu trocar was introduced.  Pneumoperitoneum was achieved.  The patient was placed in reverse Trendelenburg left side down position.  A 10 mm 0 degree laparoscope was then used to place 3 additional 5 mm working trocars along the right costal margin.  Gallbladder was noted to be gangrenous.  It was too tense to be grasped, so it was decompressed laparoscopically.  It was then grasped at the fundus and retracted cephalad toward the right shoulder.  A second grasper was used to grasp the infundibulum and flag the gallbladder.  The peritoneum was incised with electrocautery and stripped down, thus exposing Calot triangle.  Careful dissection within the triangle was performed.  Cystic duct and cystic artery were identified, skeletonized, and isolated.  Posterior aspect of the triangle was cleared to the liver thus creating the critical view.  Cystic duct was then divided with Endo Eirch between surgical clips.  Cystic artery was divided similarly.  Gallbladder was then excised from the gallbladder fossa using the hook electrocautery.  Once excision  was completed, gallbladder was placed within an EndoCatch retrieval bag, which was left in the abdominal cavity until the end of the case.  Hemostasis within the gallbladder fossa was achieved with spot cautery.  Fibrillar was used as well.  The right upper quadrant was irrigated and suctioned.  Hemostasis was ascertained.  The patient was returned to the neutral position.  Working trocars were removed and pneumoperitoneum was released.  Finally the umbilical trocar was removed.  The EndoCatch retrieval bag containing the gallbladder was removed and sent for permanent pathology.  The umbilical fascial defect was repaired with a figure-of-eight 0 Vicryl suture.  All wounds were irrigated and the skin edges reapproximated with Monocryl suture in a subcuticular fashion.  Incisions were cleaned and sterile bandages applied.  The patient tolerated the procedure well.  There were no complications.  She was awakened and extubated in the operating room, then subsequently transferred to recovery in satisfactory condition.        ______________________________  MD FAYE Brewer/KARINA  DD:  05/11/2021  Time:  02:32PM  DT:  05/11/2021  Time:  02:42PM  Job #:  [id number]

## 2022-04-30 NOTE — OP NOTE
DATE OF SURGERY:    07/27/2018    SURGEON:  Boris Dubois DO    PREOPERATIVE DIAGNOSES:    1. Intermittent claudication.  2. Chronic venous hypertension.  3. Chronic venous insufficiency.  4. Symptomatic varicose veins and spider veins of the bilateral lower extremities.  5. Edema.    POSTOPERATIVE DIAGNOSES:    1. 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   2. Mild to moderate bilateral external iliac vein compression.    PROCEDURE:  Selective bilateral lower extremity venography with intravascular ultrasonography.    COMPLICATIONS:  None.    ESTIMATED BLOOD LOSS:  None.    CONTRAST UTILIZED:  Approximately 20 mL.    HISTORY:  Ms. Cunningham is brought to the Cardiac Catheterization Laboratory today for the above outlined procedures.  Informed consent was obtained.  Using ultrasound guidance, we accessed the bilateral femoral veins and positioned 8-Trinidadian sheaths.  We then performed selective bilateral lower extremity venography into the inferior vena cava, and there was a suggestion of bilateral iliac vein compression.  We then proceeded with selective bilateral iliac vein compression, as well as intravascular ultrasound of the inferior vena cava.  The venogram of the inferior vena cava found this vessel to be within normal limits.  Intravascular ultrasonography of the right iliofemoral venous system suggested approximately 35% right external iliac vein compression.  Intravascular ultrasonography of the left iliofemoral venous system and the inferior vena cava found a widely patent inferior vena cava and severe ostial left common iliac vein compression and only 50% left external iliac vein compression.  The inferior vena cava and intravascular ultrasound also confirmed a widely patent distal inferior vena cava.  With this, the procedure was     completed successfully.  The patient tolerated the procedure rather well, and left the Cardiac Catheterization Laboratory in stable  condition.        ______________________________  Boris Dubois DO    CT/UD  DD:  07/27/2018  Time:  09:17AM  DT:  07/27/2018  Time:  09:37AM  Job #:  [id number]    cc: Boris Dubois DO

## 2022-04-30 NOTE — OP NOTE
Patient:   Selena Cunningham            MRN: [id number]            FIN: [id number]               Age:   58 years     Sex:  Female     :  1960   Associated Diagnoses:   ADHD; Anxiety; Chronic venous hypertension; Chronic venous insufficiency; Diabetes; Edema; GERD - Gastro-esophageal reflux disease; Hypercholesteremia; Pain in lower limb; Spider varicose vein; Venous intermittent claudication; Iliac vein stenosis, right; Stenosis of left iliac vein   Author:   Boris Dubois DO      Brief Operative Note   Operative Information   Date/ Time:  2018 09:09:00.     Preoperative Diagnosis: ADHD (SLE21-ZD F90.9), Anxiety (MIR26-VM F41.9), Chronic venous hypertension (NMC31-DN I87.309), Chronic venous insufficiency (HUV41-GM I87.2), Diabetes (XTL42-FN E11.9), Edema (XXY94-HX R60.9), GERD - Gastro-esophageal reflux disease (VWV26-KA K21.9), Hypercholesteremia (KUM57-YW E78.00), Pain in lower limb (ZIK29-FO M79.606), Spider varicose vein (VLT57-DG I86.8), Venous intermittent claudication (TZI58-ZG I87.8).     Postoperative Diagnosis: ADHD (OFB97-CO F90.9), Anxiety (MVC91-CB F41.9), Chronic venous hypertension (DJU50-ZT I87.309), Chronic venous insufficiency (MKH90-XE I87.2), Diabetes (YCM09-LX E11.9), Edema (IIF74-ZZ R60.9), GERD - Gastro-esophageal reflux disease (NDR68-NK K21.9), Hypercholesteremia (SML24-SP E78.00), Iliac vein stenosis, right (CUA67-BI I87.1), Pain in lower limb (UFN81-OS M79.606), Spider varicose vein (GHE52-WC I86.8), Stenosis of left iliac vein (PPZ95-UP I87.1), Venous intermittent claudication (VHB37-UL I87.8).     Procedures Performed: B/L LE Venogram with IVUS.     Surgeon: Boris Dubois DO     Speciman Removed: none.     Esimated blood loss: No blood loss.

## 2022-04-30 NOTE — OP NOTE
Patient:   Selena Cunningham            MRN: [id number]            FIN: [id number]               Age:   58 years     Sex:  Female     :  1960   Associated Diagnoses:   ADHD - Attention deficit disorder with hyperactivity; Anxiety; Edema; GERD - Gastro-esophageal reflux disease; Hyperlipidemia; Iliac vein stenosis, left; Iliac vein stenosis, right; Spider varicose vein   Author:   Boris Dubois DO      Brief Operative Note   Operative Information   Date/ Time:  8/10/2018 14:38:00.     Preoperative Diagnosis: ADHD - Attention deficit disorder with hyperactivity (ALR17-ER F90.9), Anxiety (LNO17-UQ F41.9), Edema (DLU57-CS R60.9), GERD - Gastro-esophageal reflux disease (XXH82-ZL K21.9), Hyperlipidemia (ZIC85-EG E78.5), Iliac vein stenosis, left (YLB01-ZV I87.1), Iliac vein stenosis, right (IPF28-DX I87.1), Spider varicose vein (QXK03-TG I86.8).     Postoperative Diagnosis: ADHD - Attention deficit disorder with hyperactivity (UAS71-XX F90.9), Anxiety (BAY02-LQ F41.9), Edema (ORG44-NV R60.9), GERD - Gastro-esophageal reflux disease (KZP67-EA K21.9), Hyperlipidemia (BAQ42-BU E78.5), Iliac vein stenosis, left (PON43-QB I87.1), Iliac vein stenosis, right (LKY38-EL I87.1), Spider varicose vein (MSX04-BI I86.8).     Procedures Performed: BLE Venogram with IVUS and BL CIV PTV.     Surgeon: Boris Dubois DO     Esimated blood loss: No blood loss.     Description of Procedure/Findings/    Complications: See OP report on chart.

## 2022-05-10 ENCOUNTER — ANESTHESIA EVENT (OUTPATIENT)
Dept: ENDOSCOPY | Facility: HOSPITAL | Age: 62
End: 2022-05-10
Payer: MEDICARE

## 2022-05-10 RX ORDER — CLOPIDOGREL BISULFATE 75 MG/1
75 TABLET ORAL DAILY
COMMUNITY

## 2022-05-10 RX ORDER — MOMETASONE FUROATE AND FORMOTEROL FUMARATE DIHYDRATE 200; 5 UG/1; UG/1
2 AEROSOL RESPIRATORY (INHALATION) 2 TIMES DAILY
COMMUNITY

## 2022-05-10 RX ORDER — DEXTROAMPHETAMINE SACCHARATE, AMPHETAMINE ASPARTATE MONOHYDRATE, DEXTROAMPHETAMINE SULFATE AND AMPHETAMINE SULFATE 7.5; 7.5; 7.5; 7.5 MG/1; MG/1; MG/1; MG/1
30 CAPSULE, EXTENDED RELEASE ORAL EVERY MORNING
COMMUNITY
End: 2023-09-19 | Stop reason: ALTCHOICE

## 2022-05-10 RX ORDER — LISDEXAMFETAMINE DIMESYLATE 70 MG/1
70 CAPSULE ORAL EVERY MORNING
COMMUNITY
End: 2023-09-19

## 2022-05-10 RX ORDER — DIPHENHYDRAMINE HCL 50 MG
50 CAPSULE ORAL EVERY 6 HOURS PRN
COMMUNITY
End: 2023-10-06

## 2022-05-10 RX ORDER — FOLIC ACID 1 MG/1
1 TABLET ORAL DAILY
COMMUNITY

## 2022-05-10 RX ORDER — MELOXICAM 15 MG/1
15 TABLET ORAL DAILY
COMMUNITY
End: 2023-09-19

## 2022-05-10 RX ORDER — VORTIOXETINE 20 MG/1
20 TABLET, FILM COATED ORAL
COMMUNITY
End: 2023-09-19 | Stop reason: CLARIF

## 2022-05-10 RX ORDER — DIAZEPAM 5 MG/1
5 TABLET ORAL EVERY 6 HOURS PRN
COMMUNITY
End: 2023-09-19

## 2022-05-10 RX ORDER — PANTOPRAZOLE SODIUM 40 MG/1
40 TABLET, DELAYED RELEASE ORAL DAILY
COMMUNITY

## 2022-05-10 RX ORDER — BUPROPION HYDROCHLORIDE 150 MG/1
300 TABLET ORAL DAILY
COMMUNITY

## 2022-05-10 RX ORDER — CLONAZEPAM 1 MG/1
1 TABLET ORAL 2 TIMES DAILY PRN
COMMUNITY

## 2022-05-10 NOTE — ANESTHESIA PREPROCEDURE EVALUATION
05/10/2022  Selena Cunningham is a 61 y.o., female.  Past Medical History:   Diagnosis Date    ADHD     ADHD (attention deficit hyperactivity disorder)     Anxiety     Aphthous ulcer     Asthma     Constipation     Depression     Depression     Dysphagia     Epigastric pain     Fatigue     Fatigue     Folate deficiency     Generalized anxiety disorder     GERD (gastroesophageal reflux disease)     Hx of psychiatric care     Macrocytosis     Obesity, unspecified     Psychiatric problem      Past Surgical History:   Procedure Laterality Date    LAPAROSCOPIC CHOLECYSTECTOMY       Seasonal Asthma every few years  Venous Stents in place (possibly were unnecessary)  Strong cardiac history with father & brother at early age  Negative stress test one year ago    Pre-op Assessment    I have reviewed the Patient Summary Reports.    I have reviewed the NPO Status.   I have reviewed the Medications.     Review of Systems  Anesthesia Hx:  No problems with previous Anesthesia  Denies Family Hx of Anesthesia complications.   Denies Personal Hx of Anesthesia complications.   Social:  Non-Smoker    EENT/Dental:   Denies Throat Symptoms Denies Throat Disease.    Cardiovascular:   Exercise tolerance: good Denies Dysrhythmias.   Denies Angina.  Denies Orthopnea.  Denies PND.  Denies FLORES.  Functional Capacity good / => 4 METS    Pulmonary:   Asthma    Hepatic/GI:   GERD  Esophageal / Stomach Disorders (DYSPHAGIA) Gerd, Esophageal Disorder, Esophageal Disease    Musculoskeletal:  Musculoskeletal Normal    Neurological:   Denies TIA. Denies CVA.    Psych:   Psychiatric History anxiety depression ADHD         Physical Exam  General: Well nourished, Alert and Oriented    Airway:  Mallampati: III   Mouth Opening: Normal  TM Distance: Normal  Tongue: Normal  Neck ROM: Normal  ROM    Dental:  Intact    Chest/Lungs:  Clear to auscultation    Heart:  Rate: Normal  Rhythm: Regular Rhythm  No pretibial edema  No carotid bruits      Anesthesia Plan  Type of Anesthesia, risks & benefits discussed:    Anesthesia Type: Gen Natural Airway  Intra-op Monitoring Plan: Standard ASA Monitors  Post Op Pain Control Plan: IV/PO Opioids PRN  Induction:  IV  Informed Consent: Informed consent signed with the Patient and all parties understand the risks and agree with anesthesia plan.  All questions answered. Patient consented to blood products? No  ASA Score: 3  Day of Surgery Review of History & Physical: H&P Update referred to the surgeon/provider.  Anesthesia Plan Notes: GA TIVA    Ready For Surgery From Anesthesia Perspective.     .

## 2022-05-11 ENCOUNTER — ANESTHESIA (OUTPATIENT)
Dept: ENDOSCOPY | Facility: HOSPITAL | Age: 62
End: 2022-05-11
Payer: MEDICARE

## 2022-05-11 ENCOUNTER — HOSPITAL ENCOUNTER (OUTPATIENT)
Facility: HOSPITAL | Age: 62
Discharge: HOME | End: 2022-05-11
Attending: INTERNAL MEDICINE | Admitting: INTERNAL MEDICINE
Payer: MEDICARE

## 2022-05-11 VITALS
RESPIRATION RATE: 20 BRPM | HEART RATE: 75 BPM | WEIGHT: 180 LBS | BODY MASS INDEX: 29.99 KG/M2 | TEMPERATURE: 99 F | HEIGHT: 65 IN | OXYGEN SATURATION: 100 % | SYSTOLIC BLOOD PRESSURE: 129 MMHG | DIASTOLIC BLOOD PRESSURE: 85 MMHG

## 2022-05-11 DIAGNOSIS — Z12.11 SCREENING FOR MALIGNANT NEOPLASM OF COLON: Primary | ICD-10-CM

## 2022-05-11 PROCEDURE — 45378 DIAGNOSTIC COLONOSCOPY: CPT | Performed by: INTERNAL MEDICINE

## 2022-05-11 PROCEDURE — 63600175 PHARM REV CODE 636 W HCPCS: Performed by: NURSE ANESTHETIST, CERTIFIED REGISTERED

## 2022-05-11 PROCEDURE — 96365 THER/PROPH/DIAG IV INF INIT: CPT | Performed by: INTERNAL MEDICINE

## 2022-05-11 PROCEDURE — 37000009 HC ANESTHESIA EA ADD 15 MINS: Performed by: INTERNAL MEDICINE

## 2022-05-11 PROCEDURE — 37000008 HC ANESTHESIA 1ST 15 MINUTES: Performed by: INTERNAL MEDICINE

## 2022-05-11 RX ORDER — LIDOCAINE HYDROCHLORIDE 20 MG/ML
INJECTION, SOLUTION EPIDURAL; INFILTRATION; INTRACAUDAL; PERINEURAL
Status: DISCONTINUED
Start: 2022-05-11 | End: 2022-05-11 | Stop reason: HOSPADM

## 2022-05-11 RX ORDER — PROPOFOL 10 MG/ML
INJECTION, EMULSION INTRAVENOUS
Status: DISCONTINUED | OUTPATIENT
Start: 2022-05-11 | End: 2022-05-11

## 2022-05-11 RX ORDER — SODIUM CHLORIDE, SODIUM LACTATE, POTASSIUM CHLORIDE, CALCIUM CHLORIDE 600; 310; 30; 20 MG/100ML; MG/100ML; MG/100ML; MG/100ML
INJECTION, SOLUTION INTRAVENOUS CONTINUOUS PRN
Status: DISCONTINUED | OUTPATIENT
Start: 2022-05-11 | End: 2022-05-11

## 2022-05-11 RX ORDER — IPRATROPIUM BROMIDE AND ALBUTEROL SULFATE 2.5; .5 MG/3ML; MG/3ML
3 SOLUTION RESPIRATORY (INHALATION)
Status: DISCONTINUED | OUTPATIENT
Start: 2022-05-11 | End: 2022-05-11

## 2022-05-11 RX ORDER — PROPOFOL 10 MG/ML
VIAL (ML) INTRAVENOUS
Status: DISCONTINUED
Start: 2022-05-11 | End: 2022-05-11 | Stop reason: HOSPADM

## 2022-05-11 RX ORDER — LIDOCAINE HYDROCHLORIDE 10 MG/ML
1 INJECTION, SOLUTION EPIDURAL; INFILTRATION; INTRACAUDAL; PERINEURAL ONCE
Status: DISCONTINUED | OUTPATIENT
Start: 2022-05-11 | End: 2022-05-11

## 2022-05-11 RX ORDER — SUCCINYLCHOLINE CHLORIDE 20 MG/ML
INJECTION INTRAMUSCULAR; INTRAVENOUS
Status: DISCONTINUED
Start: 2022-05-11 | End: 2022-05-11 | Stop reason: HOSPADM

## 2022-05-11 RX ORDER — LIDOCAINE HCL/PF 100 MG/5ML
SYRINGE (ML) INTRAVENOUS
Status: DISCONTINUED | OUTPATIENT
Start: 2022-05-11 | End: 2022-05-11

## 2022-05-11 RX ORDER — SODIUM CHLORIDE, SODIUM GLUCONATE, SODIUM ACETATE, POTASSIUM CHLORIDE AND MAGNESIUM CHLORIDE 30; 37; 368; 526; 502 MG/100ML; MG/100ML; MG/100ML; MG/100ML; MG/100ML
1000 INJECTION, SOLUTION INTRAVENOUS CONTINUOUS
Status: DISCONTINUED | OUTPATIENT
Start: 2022-05-11 | End: 2022-05-11

## 2022-05-11 RX ADMIN — PROPOFOL 30 MG: 10 INJECTION, EMULSION INTRAVENOUS at 09:05

## 2022-05-11 RX ADMIN — SODIUM CHLORIDE, POTASSIUM CHLORIDE, SODIUM LACTATE AND CALCIUM CHLORIDE: 600; 310; 30; 20 INJECTION, SOLUTION INTRAVENOUS at 08:05

## 2022-05-11 RX ADMIN — Medication 100 MG: at 08:05

## 2022-05-11 RX ADMIN — PROPOFOL 50 MG: 10 INJECTION, EMULSION INTRAVENOUS at 08:05

## 2022-05-11 NOTE — ANESTHESIA POSTPROCEDURE EVALUATION
Anesthesia Post Evaluation    Patient: Selena Cunningham    Procedure(s) Performed: Procedure(s) (LRB):  COLONOSCOPY (N/A)    Final Anesthesia Type: general      Patient location during evaluation: GI PACU  Patient participation: Yes- Able to Participate  Level of consciousness: awake and alert and oriented  Post-procedure vital signs: reviewed and stable  Pain management: adequate  Airway patency: patent    PONV status at discharge: No PONV  Anesthetic complications: no      Cardiovascular status: hemodynamically stable  Respiratory status: unassisted  Hydration status: euvolemic  Follow-up not needed.          Vitals Value Taken Time   /75 05/11/22 0915   Temp 98.0 05/11/22 0926   Pulse 74 05/11/22 0915   Resp 16 05/11/22 0915   SpO2 99 % 05/11/22 0915         No case tracking events are documented in the log.      Pain/Toño Score: No data recorded

## 2022-05-11 NOTE — TRANSFER OF CARE
"Anesthesia Transfer of Care Note    Patient: Selena Cunningham    Procedure(s) Performed: Procedure(s) (LRB):  COLONOSCOPY (N/A)    Patient location: GI    Anesthesia Type: MAC    Transport from OR: Transported from OR on room air with adequate spontaneous ventilation    Post pain: adequate analgesia    Post assessment: no apparent anesthetic complications and tolerated procedure well    Post vital signs: stable    Level of consciousness: sedated    Nausea/Vomiting: no nausea/vomiting    Complications: none    Transfer of care protocol was followed      Last vitals:   Visit Vitals  BP (!) 147/71   Pulse 81   Temp 37.1 °C (98.8 °F)   Resp 17   Ht 5' 5" (1.651 m)   Wt 81.6 kg (180 lb)   SpO2 97%   BMI 29.95 kg/m²     "

## 2022-05-11 NOTE — PROVATION PATIENT INSTRUCTIONS
Discharge Summary/Instructions after an Endoscopic Procedure  Patient Name: Selena Cunningham  Patient MRN: [id number]  Patient YOB: 1960  Wednesday, May 11, 2022  Damion Martinez MD  Dear patient,  As a result of recent federal legislation (The Federal Cures Act), you may   receive lab or pathology results from your procedure in your [facility]   account before your physician is able to contact you. Your physician or   their representative will relay the results to you with their   recommendations at their soonest availability.  Thank you,  RESTRICTIONS:  During your procedure today, you received medications for sedation.  These   medications may affect your judgment, balance and coordination.  Therefore,   for 24 hours, you have the following restrictions:   - DO NOT drive a car, operate machinery, make legal/financial decisions,   sign important papers or drink alcohol.    ACTIVITY:  Today: no heavy lifting, straining or running due to procedural   sedation/anesthesia.  The following day: return to full activity including work.  DIET:  Eat and drink normally unless instructed otherwise.     TREATMENT FOR COMMON SIDE EFFECTS:  - Mild abdominal pain, nausea, belching, bloating or excessive gas:  rest,   eat lightly and use a heating pad.  - Sore Throat: treat with throat lozenges and/or gargle with warm salt   water.  - Because air was used during the procedure, expelling large amounts of air   from your rectum or belching is normal.  - If a bowel prep was taken, you may not have a bowel movement for 1-3 days.    This is normal.  SYMPTOMS TO WATCH FOR AND REPORT TO YOUR PHYSICIAN:  1. Abdominal pain or bloating, other than gas cramps.  2. Chest pain.  3. Back pain.  4. Signs of infection such as: chills or fever occurring within 24 hours   after the procedure.  5. Rectal bleeding, which would show as bright red, maroon, or black stools.   (A tablespoon of blood from the rectum is not serious,  especially if   hemorrhoids are present.)  6. Vomiting.  7. Weakness or dizziness.  GO DIRECTLY TO THE NEAREST EMERGENCY ROOM IF YOU HAVE ANY OF THE FOLLOWING:      Difficulty breathing              Chills and/or fever over 101 F   Persistent vomiting and/or vomiting blood   Severe abdominal pain   Severe chest pain   Black, tarry stools   Bleeding- more than one tablespoon   Any other symptom or condition that you feel may need urgent attention  Your doctor recommends these additional instructions:  If any biopsies were taken, your doctors clinic will contact you in 1 to 2   weeks with any results.  - Repeat colonoscopy in 5 years given prep.   - resume meds and blood thinners  -resume normal diet  For questions, problems or results please call your physician - Damion Martinez MD at Work:  [phone].  [facility] EMERGENCY ROOM PHONE NUMBER: [phone]  IF A COMPLICATION OR EMERGENCY SITUATION ARISES AND YOU ARE UNABLE TO REACH   YOUR PHYSICIAN - GO DIRECTLY TO THE EMERGENCY ROOM.  MD Damion Lewis MD  5/11/2022 9:24:37 AM  This report has been verified and signed electronically.  Dear patient,  As a result of recent federal legislation (The Federal Cures Act), you may   receive lab or pathology results from your procedure in your [facility]   account before your physician is able to contact you. Your physician or   their representative will relay the results to you with their   recommendations at their soonest availability.  Thank you,  PROVATION

## 2022-05-11 NOTE — H&P
History & Physical            HPI:    61 year old woman with pvd, gerd, iliac stent, esophageal ring here for screening colonoscopy.  She last had c-scope many years ago.  She denies rectal bleeding, bowel changes, or weight loss.     We recently did egd on her and she does state her dysphagia has improved with dilation. She remains on nexium.            PCP:    NAYE MASSEY DO        Review of patient's allergies indicates:   Allergen Reactions    Pcn [penicillins] Rash            Current Facility-Administered Medications   Medication Dose Route Frequency Provider Last Rate Last Admin    electrolyte-A infusion 1,000 mL  1,000 mL Intravenous Continuous Nathaniel Woodward MD        LIDOcaine (PF) 10 mg/ml (1%) injection 10 mg  1 mL Intradermal Once Nathaniel Woodward MD           Medications Prior to Admission   Medication Sig Dispense Refill Last Dose    clopidogreL (PLAVIX) 75 mg tablet Take 75 mg by mouth once daily.   5/6/2022    buPROPion (WELLBUTRIN XL) 150 MG TB24 tablet Take 150 mg by mouth once daily.       clonazePAM (KLONOPIN) 1 MG tablet Take 1 mg by mouth 2 (two) times daily as needed for Anxiety.       dextroamphetamine-amphetamine (ADDERALL XR) 30 MG 24 hr capsule Take 30 mg by mouth every morning.       diazePAM (VALIUM) 5 MG tablet Take 5 mg by mouth every 6 (six) hours as needed for Anxiety.       diphenhydrAMINE (BENADRYL) 50 MG capsule Take 50 mg by mouth every 6 (six) hours as needed for Itching.       folic acid (FOLVITE) 1 MG tablet Take 1 mg by mouth once daily.       folic acid-vit B6-vit B12 2.5-25-2 mg (FOLBIC OR EQUIV) 2.5-25-2 mg Tab Take 1 tablet by mouth once daily. 30 tablet 1     liothyronine (CYTOMEL) 5 MCG Tab Take 2 tablets (10 mcg total) by mouth once daily. 60 tablet 1     lisdexamfetamine (VYVANSE) 70 MG capsule Take 70 mg by mouth every morning.       meloxicam (MOBIC) 15 MG tablet Take 15 mg by mouth once daily.       mometasone-formoterol (DULERA) 200-5  mcg/actuation inhaler Inhale 2 puffs into the lungs 2 (two) times daily. Controller       pantoprazole (PROTONIX) 40 MG tablet Take 40 mg by mouth once daily.       quetiapine (SEROQUEL) 200 MG Tab Take 1 tablet (200 mg total) by mouth every evening. 30 tablet 1     vortioxetine (TRINTELLIX) 20 mg Tab Take 20 mg by mouth.              Past Medical History:    Past Medical History:   Diagnosis Date    ADHD     ADHD (attention deficit hyperactivity disorder)     Anxiety     Aphthous ulcer     Asthma     Constipation     Depression     Depression     Dysphagia     Epigastric pain     Fatigue     Fatigue     Folate deficiency     Generalized anxiety disorder     GERD (gastroesophageal reflux disease)     Hx of psychiatric care     Macrocytosis     Obesity, unspecified     Psychiatric problem         Past Surgical History:    Past Surgical History:   Procedure Laterality Date    APPENDECTOMY      HYSTERECTOMY      LAPAROSCOPIC CHOLECYSTECTOMY      NASAL SINUS SURGERY      TONSILLECTOMY          Family History:    Family History   Problem Relation Age of Onset    Drug abuse Brother        Social History:    Social History     Tobacco Use    Smoking status: Former Smoker    Smokeless tobacco: Never Used   Substance Use Topics    Alcohol use: No                 Review of Systems:        Review of Systems   Constitutional: Negative.    Respiratory: Negative.    Cardiovascular: Negative.            Objective:            VITAL SIGNS: 24 HR MIN & MAX    LAST    Temp  Min: 98.8 °F (37.1 °C)  Max: 98.8 °F (37.1 °C)    98.8 °F (37.1 °C)    BP  Min: 147/71  Max: 147/71    (!) 147/71    Pulse  Min: 81  Max: 81    81    Resp  Min: 17  Max: 17    17    SpO2  Min: 97 %  Max: 97 %    97 %        No intake or output data in the 24 hours ending 05/11/22 0839        Physical Exam  Vitals reviewed.   Constitutional:       Appearance: Normal appearance.   HENT:      Head: Normocephalic.   Cardiovascular:       Rate and Rhythm: Normal rate.   Pulmonary:      Effort: Pulmonary effort is normal.   Abdominal:      General: Abdomen is flat. Bowel sounds are normal.      Palpations: Abdomen is soft.   Skin:     Capillary Refill: Capillary refill takes less than 2 seconds.   Neurological:      Mental Status: She is alert and oriented to person, place, and time.                 No results found for this or any previous visit (from the past 48 hour(s)).        No results found.              Assessment & Plan:     1.  Screening colon     Plan for c-scope.

## 2022-08-22 ENCOUNTER — HOSPITAL ENCOUNTER (OUTPATIENT)
Dept: RADIOLOGY | Facility: HOSPITAL | Age: 62
Discharge: HOME | End: 2022-08-22
Attending: INTERNAL MEDICINE
Payer: MEDICARE

## 2022-08-22 DIAGNOSIS — Z12.31 ENCOUNTER FOR SCREENING MAMMOGRAM FOR BREAST CANCER: ICD-10-CM

## 2022-08-22 PROCEDURE — 77067 SCR MAMMO BI INCL CAD: CPT | Mod: TC

## 2022-08-22 PROCEDURE — 77063 BREAST TOMOSYNTHESIS BI: CPT | Mod: 26,,, | Performed by: RADIOLOGY

## 2022-08-22 PROCEDURE — 77067 MAMMO DIGITAL SCREENING BILAT WITH TOMO: ICD-10-PCS | Mod: 26,,, | Performed by: RADIOLOGY

## 2022-08-22 PROCEDURE — 77063 MAMMO DIGITAL SCREENING BILAT WITH TOMO: ICD-10-PCS | Mod: 26,,, | Performed by: RADIOLOGY

## 2022-08-22 PROCEDURE — 77067 SCR MAMMO BI INCL CAD: CPT | Mod: 26,,, | Performed by: RADIOLOGY

## 2022-09-23 ENCOUNTER — LAB VISIT (OUTPATIENT)
Dept: LAB | Facility: HOSPITAL | Age: 62
End: 2022-09-23
Attending: INTERNAL MEDICINE
Payer: MEDICARE

## 2022-09-23 DIAGNOSIS — Z13.220 SCREENING FOR LIPOID DISORDERS: ICD-10-CM

## 2022-09-23 DIAGNOSIS — Z00.00 ROUTINE GENERAL MEDICAL EXAMINATION AT A HEALTH CARE FACILITY: ICD-10-CM

## 2022-09-23 DIAGNOSIS — E53.8 FOLIC ACID DEFICIENCY (NON ANEMIC): ICD-10-CM

## 2022-09-23 DIAGNOSIS — Z13.1 SCREENING FOR DIABETES MELLITUS: Primary | ICD-10-CM

## 2022-09-23 LAB
ALBUMIN SERPL-MCNC: 3.7 GM/DL (ref 3.4–4.8)
ALBUMIN/GLOB SERPL: 1.5 RATIO (ref 1.1–2)
ALP SERPL-CCNC: 81 UNIT/L (ref 40–150)
ALT SERPL-CCNC: 15 UNIT/L (ref 0–55)
APPEARANCE UR: CLEAR
AST SERPL-CCNC: 13 UNIT/L (ref 5–34)
BACTERIA #/AREA URNS AUTO: ABNORMAL /HPF
BASOPHILS # BLD AUTO: 0.02 X10(3)/MCL (ref 0–0.2)
BASOPHILS NFR BLD AUTO: 0.6 %
BILIRUB UR QL STRIP.AUTO: NEGATIVE MG/DL
BILIRUBIN DIRECT+TOT PNL SERPL-MCNC: 0.4 MG/DL
BUN SERPL-MCNC: 14.3 MG/DL (ref 9.8–20.1)
CALCIUM SERPL-MCNC: 9.5 MG/DL (ref 8.4–10.2)
CHLORIDE SERPL-SCNC: 107 MMOL/L (ref 98–107)
CHOLEST SERPL-MCNC: 203 MG/DL
CHOLEST/HDLC SERPL: 3 {RATIO} (ref 0–5)
CO2 SERPL-SCNC: 28 MMOL/L (ref 23–31)
COLOR UR AUTO: YELLOW
CREAT SERPL-MCNC: 0.94 MG/DL (ref 0.55–1.02)
EOSINOPHIL # BLD AUTO: 0.11 X10(3)/MCL (ref 0–0.9)
EOSINOPHIL NFR BLD AUTO: 3.1 %
ERYTHROCYTE [DISTWIDTH] IN BLOOD BY AUTOMATED COUNT: 13.1 % (ref 11.5–17)
FOLATE SERPL-MCNC: 6.9 NG/ML (ref 7–31.4)
GFR SERPLBLD CREATININE-BSD FMLA CKD-EPI: >60 MLS/MIN/1.73/M2
GLOBULIN SER-MCNC: 2.5 GM/DL (ref 2.4–3.5)
GLUCOSE SERPL-MCNC: 89 MG/DL (ref 82–115)
GLUCOSE UR QL STRIP.AUTO: NEGATIVE MG/DL
HCT VFR BLD AUTO: 43.5 % (ref 37–47)
HDLC SERPL-MCNC: 62 MG/DL (ref 35–60)
HGB BLD-MCNC: 13.7 GM/DL (ref 12–16)
IMM GRANULOCYTES # BLD AUTO: 0.01 X10(3)/MCL (ref 0–0.04)
IMM GRANULOCYTES NFR BLD AUTO: 0.3 %
KETONES UR QL STRIP.AUTO: NEGATIVE MG/DL
LDLC SERPL CALC-MCNC: 122 MG/DL (ref 50–140)
LEUKOCYTE ESTERASE UR QL STRIP.AUTO: ABNORMAL UNIT/L
LYMPHOCYTES # BLD AUTO: 1.22 X10(3)/MCL (ref 0.6–4.6)
LYMPHOCYTES NFR BLD AUTO: 34.2 %
MCH RBC QN AUTO: 31.5 PG (ref 27–31)
MCHC RBC AUTO-ENTMCNC: 31.5 MG/DL (ref 33–36)
MCV RBC AUTO: 100 FL (ref 80–94)
MONOCYTES # BLD AUTO: 0.23 X10(3)/MCL (ref 0.1–1.3)
MONOCYTES NFR BLD AUTO: 6.4 %
NEUTROPHILS # BLD AUTO: 2 X10(3)/MCL (ref 2.1–9.2)
NEUTROPHILS NFR BLD AUTO: 55.4 %
NITRITE UR QL STRIP.AUTO: NEGATIVE
NRBC BLD AUTO-RTO: 0 %
PH UR STRIP.AUTO: 5 [PH]
PLATELET # BLD AUTO: 263 X10(3)/MCL (ref 130–400)
PMV BLD AUTO: 10.1 FL (ref 7.4–10.4)
POTASSIUM SERPL-SCNC: 4.7 MMOL/L (ref 3.5–5.1)
PROT SERPL-MCNC: 6.2 GM/DL (ref 5.8–7.6)
PROT UR QL STRIP.AUTO: NEGATIVE MG/DL
RBC # BLD AUTO: 4.35 X10(6)/MCL (ref 4.2–5.4)
RBC #/AREA URNS AUTO: <5 /HPF
RBC UR QL AUTO: NEGATIVE UNIT/L
SODIUM SERPL-SCNC: 142 MMOL/L (ref 136–145)
SP GR UR STRIP.AUTO: 1.01 (ref 1–1.03)
SQUAMOUS #/AREA URNS AUTO: <5 /HPF
TRIGL SERPL-MCNC: 96 MG/DL (ref 37–140)
UROBILINOGEN UR STRIP-ACNC: 0.2 MG/DL
VLDLC SERPL CALC-MCNC: 19 MG/DL
WBC # SPEC AUTO: 3.6 X10(3)/MCL (ref 4.5–11.5)
WBC #/AREA URNS AUTO: 11 /HPF

## 2022-09-23 PROCEDURE — 82746 ASSAY OF FOLIC ACID SERUM: CPT

## 2022-09-23 PROCEDURE — 80061 LIPID PANEL: CPT | Mod: GA

## 2022-09-23 PROCEDURE — 80053 COMPREHEN METABOLIC PANEL: CPT

## 2022-09-23 PROCEDURE — 36415 COLL VENOUS BLD VENIPUNCTURE: CPT

## 2022-09-23 PROCEDURE — 85025 COMPLETE CBC W/AUTO DIFF WBC: CPT

## 2022-09-23 PROCEDURE — 81001 URINALYSIS AUTO W/SCOPE: CPT

## 2022-09-25 LAB — BACTERIA UR CULT: NORMAL

## 2022-11-21 ENCOUNTER — LAB REQUISITION (OUTPATIENT)
Dept: LAB | Facility: HOSPITAL | Age: 62
End: 2022-11-21
Payer: MEDICARE

## 2022-11-21 DIAGNOSIS — R35.0 FREQUENCY OF MICTURITION: ICD-10-CM

## 2022-11-21 LAB
APPEARANCE UR: ABNORMAL
BACTERIA #/AREA URNS AUTO: ABNORMAL /HPF
BILIRUB UR QL STRIP.AUTO: NEGATIVE MG/DL
COLOR UR AUTO: ABNORMAL
GLUCOSE UR QL STRIP.AUTO: NEGATIVE MG/DL
KETONES UR QL STRIP.AUTO: NEGATIVE MG/DL
LEUKOCYTE ESTERASE UR QL STRIP.AUTO: ABNORMAL UNIT/L
NITRITE UR QL STRIP.AUTO: POSITIVE
PH UR STRIP.AUTO: 6.5 [PH]
PROT UR QL STRIP.AUTO: NEGATIVE MG/DL
RBC #/AREA URNS AUTO: ABNORMAL /HPF
RBC UR QL AUTO: NEGATIVE UNIT/L
SP GR UR STRIP.AUTO: 1.01 (ref 1–1.03)
SQUAMOUS #/AREA URNS AUTO: ABNORMAL /HPF
UROBILINOGEN UR STRIP-ACNC: 1 MG/DL
WBC #/AREA URNS AUTO: ABNORMAL /HPF

## 2022-11-21 PROCEDURE — 87088 URINE BACTERIA CULTURE: CPT | Performed by: INTERNAL MEDICINE

## 2022-11-21 PROCEDURE — 81001 URINALYSIS AUTO W/SCOPE: CPT | Performed by: INTERNAL MEDICINE

## 2022-11-23 LAB
BACTERIA UR CULT: ABNORMAL

## 2022-12-06 ENCOUNTER — LAB VISIT (OUTPATIENT)
Dept: LAB | Facility: HOSPITAL | Age: 62
End: 2022-12-06
Attending: INTERNAL MEDICINE
Payer: MEDICARE

## 2022-12-06 DIAGNOSIS — R53.83 FATIGUE, UNSPECIFIED TYPE: ICD-10-CM

## 2022-12-06 DIAGNOSIS — E78.00 PURE HYPERCHOLESTEROLEMIA: Primary | ICD-10-CM

## 2022-12-06 LAB
CHOLEST SERPL-MCNC: 210 MG/DL
CHOLEST/HDLC SERPL: 4 {RATIO} (ref 0–5)
DEPRECATED CALCIDIOL+CALCIFEROL SERPL-MC: 33.5 NG/ML (ref 30–80)
HDLC SERPL-MCNC: 60 MG/DL (ref 35–60)
LDLC SERPL CALC-MCNC: 138 MG/DL (ref 50–140)
TRIGL SERPL-MCNC: 58 MG/DL (ref 37–140)
VLDLC SERPL CALC-MCNC: 12 MG/DL

## 2022-12-06 PROCEDURE — 82306 VITAMIN D 25 HYDROXY: CPT

## 2022-12-06 PROCEDURE — 36415 COLL VENOUS BLD VENIPUNCTURE: CPT

## 2022-12-06 PROCEDURE — 80061 LIPID PANEL: CPT

## 2023-09-19 ENCOUNTER — HOSPITAL ENCOUNTER (INPATIENT)
Facility: HOSPITAL | Age: 63
LOS: 1 days | Discharge: HOME | DRG: 690 | End: 2023-09-20
Attending: STUDENT IN AN ORGANIZED HEALTH CARE EDUCATION/TRAINING PROGRAM | Admitting: STUDENT IN AN ORGANIZED HEALTH CARE EDUCATION/TRAINING PROGRAM
Payer: MEDICARE

## 2023-09-19 DIAGNOSIS — R42 DIZZINESS: ICD-10-CM

## 2023-09-19 DIAGNOSIS — I63.9 CVA (CEREBRAL VASCULAR ACCIDENT): ICD-10-CM

## 2023-09-19 LAB
ALBUMIN SERPL-MCNC: 3.5 G/DL (ref 3.4–4.8)
ALBUMIN/GLOB SERPL: 1.3 RATIO (ref 1.1–2)
ALP SERPL-CCNC: 85 UNIT/L (ref 40–150)
ALT SERPL-CCNC: 12 UNIT/L (ref 0–55)
AMPHET UR QL SCN: NEGATIVE
APPEARANCE UR: ABNORMAL
AST SERPL-CCNC: 13 UNIT/L (ref 5–34)
BACTERIA #/AREA URNS AUTO: ABNORMAL /HPF
BARBITURATE SCN PRESENT UR: NEGATIVE
BASOPHILS # BLD AUTO: 0.03 X10(3)/MCL
BASOPHILS NFR BLD AUTO: 0.6 %
BENZODIAZ UR QL SCN: NEGATIVE
BILIRUB SERPL-MCNC: 0.2 MG/DL
BILIRUB UR QL STRIP.AUTO: NEGATIVE
BUN SERPL-MCNC: 16.9 MG/DL (ref 9.8–20.1)
CALCIUM SERPL-MCNC: 8.9 MG/DL (ref 8.4–10.2)
CANNABINOIDS UR QL SCN: NEGATIVE
CHLORIDE SERPL-SCNC: 109 MMOL/L (ref 98–107)
CO2 SERPL-SCNC: 24 MMOL/L (ref 23–31)
COCAINE UR QL SCN: NEGATIVE
COLOR UR: YELLOW
CREAT SERPL-MCNC: 0.94 MG/DL (ref 0.55–1.02)
CRP SERPL-MCNC: 4.5 MG/L
EOSINOPHIL # BLD AUTO: 0.17 X10(3)/MCL (ref 0–0.9)
EOSINOPHIL NFR BLD AUTO: 3.5 %
ERYTHROCYTE [DISTWIDTH] IN BLOOD BY AUTOMATED COUNT: 12.9 % (ref 11.5–17)
ERYTHROCYTE [SEDIMENTATION RATE] IN BLOOD: 20 MM/HR (ref 0–20)
FENTANYL UR QL SCN: NEGATIVE
FLUAV AG UPPER RESP QL IA.RAPID: NOT DETECTED
FLUBV AG UPPER RESP QL IA.RAPID: NOT DETECTED
GFR SERPLBLD CREATININE-BSD FMLA CKD-EPI: >60 MLS/MIN/1.73/M2
GLOBULIN SER-MCNC: 2.6 GM/DL (ref 2.4–3.5)
GLUCOSE SERPL-MCNC: 96 MG/DL (ref 82–115)
GLUCOSE UR QL STRIP.AUTO: NEGATIVE
HCT VFR BLD AUTO: 42 % (ref 37–47)
HGB BLD-MCNC: 13.9 G/DL (ref 12–16)
IMM GRANULOCYTES # BLD AUTO: 0.04 X10(3)/MCL (ref 0–0.04)
IMM GRANULOCYTES NFR BLD AUTO: 0.8 %
INR PPP: 0.9
KETONES UR QL STRIP.AUTO: NEGATIVE
LEUKOCYTE ESTERASE UR QL STRIP.AUTO: ABNORMAL
LYMPHOCYTES # BLD AUTO: 1.3 X10(3)/MCL (ref 0.6–4.6)
LYMPHOCYTES NFR BLD AUTO: 27 %
MCH RBC QN AUTO: 31.7 PG (ref 27–31)
MCHC RBC AUTO-ENTMCNC: 33.1 G/DL (ref 33–36)
MCV RBC AUTO: 95.9 FL (ref 80–94)
MDMA UR QL SCN: NEGATIVE
MONOCYTES # BLD AUTO: 0.4 X10(3)/MCL (ref 0.1–1.3)
MONOCYTES NFR BLD AUTO: 8.3 %
NEUTROPHILS # BLD AUTO: 2.88 X10(3)/MCL (ref 2.1–9.2)
NEUTROPHILS NFR BLD AUTO: 59.8 %
NITRITE UR QL STRIP.AUTO: NEGATIVE
NRBC BLD AUTO-RTO: 0 %
OPIATES UR QL SCN: NEGATIVE
PCP UR QL: NEGATIVE
PH UR STRIP.AUTO: 5.5 [PH]
PH UR: 5.5 [PH] (ref 3–11)
PLATELET # BLD AUTO: 264 X10(3)/MCL (ref 130–400)
PMV BLD AUTO: 9.7 FL (ref 7.4–10.4)
POTASSIUM SERPL-SCNC: 4.5 MMOL/L (ref 3.5–5.1)
PROT SERPL-MCNC: 6.1 GM/DL (ref 5.8–7.6)
PROT UR QL STRIP.AUTO: NEGATIVE
PROTHROMBIN TIME: 12.1 SECONDS (ref 12.5–14.5)
RBC # BLD AUTO: 4.38 X10(6)/MCL (ref 4.2–5.4)
RBC #/AREA URNS AUTO: ABNORMAL /HPF
RBC UR QL AUTO: NEGATIVE
SARS-COV-2 RNA RESP QL NAA+PROBE: NOT DETECTED
SODIUM SERPL-SCNC: 141 MMOL/L (ref 136–145)
SP GR UR STRIP.AUTO: 1.01 (ref 1–1.03)
SQUAMOUS #/AREA URNS AUTO: ABNORMAL /HPF
STREP A PCR (OHS): NOT DETECTED
TROPONIN I SERPL-MCNC: <0.01 NG/ML (ref 0–0.04)
UROBILINOGEN UR STRIP-ACNC: 0.2
WBC # SPEC AUTO: 4.82 X10(3)/MCL (ref 4.5–11.5)
WBC #/AREA URNS AUTO: ABNORMAL /HPF

## 2023-09-19 PROCEDURE — 86140 C-REACTIVE PROTEIN: CPT | Performed by: PHYSICIAN ASSISTANT

## 2023-09-19 PROCEDURE — 87651 STREP A DNA AMP PROBE: CPT | Performed by: PHYSICIAN ASSISTANT

## 2023-09-19 PROCEDURE — 84484 ASSAY OF TROPONIN QUANT: CPT | Performed by: NURSE PRACTITIONER

## 2023-09-19 PROCEDURE — 25500020 PHARM REV CODE 255: Performed by: STUDENT IN AN ORGANIZED HEALTH CARE EDUCATION/TRAINING PROGRAM

## 2023-09-19 PROCEDURE — 87186 SC STD MICRODIL/AGAR DIL: CPT | Performed by: NURSE PRACTITIONER

## 2023-09-19 PROCEDURE — 87088 URINE BACTERIA CULTURE: CPT | Performed by: NURSE PRACTITIONER

## 2023-09-19 PROCEDURE — 93005 ELECTROCARDIOGRAM TRACING: CPT

## 2023-09-19 PROCEDURE — 85025 COMPLETE CBC W/AUTO DIFF WBC: CPT | Performed by: NURSE PRACTITIONER

## 2023-09-19 PROCEDURE — 85610 PROTHROMBIN TIME: CPT | Performed by: NURSE PRACTITIONER

## 2023-09-19 PROCEDURE — 81001 URINALYSIS AUTO W/SCOPE: CPT | Performed by: NURSE PRACTITIONER

## 2023-09-19 PROCEDURE — 25000003 PHARM REV CODE 250: Performed by: STUDENT IN AN ORGANIZED HEALTH CARE EDUCATION/TRAINING PROGRAM

## 2023-09-19 PROCEDURE — 80307 DRUG TEST PRSMV CHEM ANLYZR: CPT | Performed by: PHYSICIAN ASSISTANT

## 2023-09-19 PROCEDURE — 63600175 PHARM REV CODE 636 W HCPCS: Performed by: PHYSICIAN ASSISTANT

## 2023-09-19 PROCEDURE — 99285 EMERGENCY DEPT VISIT HI MDM: CPT | Mod: 25

## 2023-09-19 PROCEDURE — 25000003 PHARM REV CODE 250: Performed by: NURSE PRACTITIONER

## 2023-09-19 PROCEDURE — 80053 COMPREHEN METABOLIC PANEL: CPT | Performed by: NURSE PRACTITIONER

## 2023-09-19 PROCEDURE — 85652 RBC SED RATE AUTOMATED: CPT | Performed by: PHYSICIAN ASSISTANT

## 2023-09-19 PROCEDURE — 11000001 HC ACUTE MED/SURG PRIVATE ROOM

## 2023-09-19 PROCEDURE — 21400001 HC TELEMETRY ROOM

## 2023-09-19 PROCEDURE — 25000003 PHARM REV CODE 250: Performed by: PHYSICIAN ASSISTANT

## 2023-09-19 PROCEDURE — 0240U COVID/FLU A&B PCR: CPT | Performed by: PHYSICIAN ASSISTANT

## 2023-09-19 RX ORDER — LABETALOL HYDROCHLORIDE 5 MG/ML
10 INJECTION, SOLUTION INTRAVENOUS
Status: DISCONTINUED | OUTPATIENT
Start: 2023-09-19 | End: 2023-09-20 | Stop reason: HOSPADM

## 2023-09-19 RX ORDER — BUPROPION HYDROCHLORIDE 150 MG/1
150 TABLET ORAL DAILY
Status: DISCONTINUED | OUTPATIENT
Start: 2023-09-20 | End: 2023-09-20 | Stop reason: HOSPADM

## 2023-09-19 RX ORDER — VILAZODONE HYDROCHLORIDE 20 MG/1
20 TABLET ORAL DAILY
COMMUNITY

## 2023-09-19 RX ORDER — SODIUM CHLORIDE 0.9 % (FLUSH) 0.9 %
10 SYRINGE (ML) INJECTION
Status: DISCONTINUED | OUTPATIENT
Start: 2023-09-19 | End: 2023-09-20 | Stop reason: HOSPADM

## 2023-09-19 RX ORDER — FINASTERIDE 5 MG/1
5 TABLET, FILM COATED ORAL DAILY
Status: DISCONTINUED | OUTPATIENT
Start: 2023-09-20 | End: 2023-09-20 | Stop reason: HOSPADM

## 2023-09-19 RX ORDER — ASPIRIN 325 MG
325 TABLET, DELAYED RELEASE (ENTERIC COATED) ORAL
Status: COMPLETED | OUTPATIENT
Start: 2023-09-19 | End: 2023-09-19

## 2023-09-19 RX ORDER — CLONAZEPAM 1 MG/1
1 TABLET ORAL 2 TIMES DAILY PRN
Status: DISCONTINUED | OUTPATIENT
Start: 2023-09-19 | End: 2023-09-20

## 2023-09-19 RX ORDER — OXYBUTYNIN CHLORIDE 10 MG/1
10 TABLET, EXTENDED RELEASE ORAL DAILY
COMMUNITY

## 2023-09-19 RX ORDER — METHYLPHENIDATE HYDROCHLORIDE 20 MG/1
20 TABLET ORAL 2 TIMES DAILY
COMMUNITY

## 2023-09-19 RX ORDER — ONDANSETRON 2 MG/ML
4 INJECTION INTRAMUSCULAR; INTRAVENOUS EVERY 4 HOURS PRN
Status: DISCONTINUED | OUTPATIENT
Start: 2023-09-19 | End: 2023-09-20 | Stop reason: HOSPADM

## 2023-09-19 RX ORDER — MECLIZINE HYDROCHLORIDE 25 MG/1
25 TABLET ORAL
Status: COMPLETED | OUTPATIENT
Start: 2023-09-19 | End: 2023-09-19

## 2023-09-19 RX ORDER — HYDRALAZINE HYDROCHLORIDE 20 MG/ML
10 INJECTION INTRAMUSCULAR; INTRAVENOUS EVERY 6 HOURS PRN
Status: DISCONTINUED | OUTPATIENT
Start: 2023-09-19 | End: 2023-09-20 | Stop reason: HOSPADM

## 2023-09-19 RX ORDER — OXYBUTYNIN CHLORIDE 5 MG/1
5 TABLET ORAL DAILY
Status: DISCONTINUED | OUTPATIENT
Start: 2023-09-20 | End: 2023-09-20 | Stop reason: HOSPADM

## 2023-09-19 RX ORDER — MINOXIDIL 2.5 MG/1
2.5 TABLET ORAL DAILY
COMMUNITY

## 2023-09-19 RX ORDER — VILAZODONE HYDROCHLORIDE 20 MG/1
20 TABLET ORAL DAILY
Status: DISCONTINUED | OUTPATIENT
Start: 2023-09-20 | End: 2023-09-20 | Stop reason: HOSPADM

## 2023-09-19 RX ORDER — FINASTERIDE 5 MG/1
5 TABLET, FILM COATED ORAL DAILY
COMMUNITY

## 2023-09-19 RX ORDER — SODIUM CHLORIDE 9 MG/ML
INJECTION, SOLUTION INTRAVENOUS CONTINUOUS
Status: DISCONTINUED | OUTPATIENT
Start: 2023-09-19 | End: 2023-09-20 | Stop reason: HOSPADM

## 2023-09-19 RX ORDER — BISACODYL 10 MG
10 SUPPOSITORY, RECTAL RECTAL DAILY PRN
Status: DISCONTINUED | OUTPATIENT
Start: 2023-09-19 | End: 2023-09-20 | Stop reason: HOSPADM

## 2023-09-19 RX ORDER — ZOLPIDEM TARTRATE 10 MG/1
10 TABLET ORAL NIGHTLY PRN
COMMUNITY

## 2023-09-19 RX ORDER — ACETAMINOPHEN 325 MG/1
650 TABLET ORAL EVERY 6 HOURS PRN
Status: DISCONTINUED | OUTPATIENT
Start: 2023-09-19 | End: 2023-09-20 | Stop reason: HOSPADM

## 2023-09-19 RX ADMIN — MECLIZINE HYDROCHLORIDE 25 MG: 25 TABLET ORAL at 04:09

## 2023-09-19 RX ADMIN — SODIUM CHLORIDE: 9 INJECTION, SOLUTION INTRAVENOUS at 11:09

## 2023-09-19 RX ADMIN — IOPAMIDOL 100 ML: 755 INJECTION, SOLUTION INTRAVENOUS at 07:09

## 2023-09-19 RX ADMIN — ASPIRIN 325 MG: 325 TABLET, COATED ORAL at 08:09

## 2023-09-19 RX ADMIN — SODIUM CHLORIDE, POTASSIUM CHLORIDE, SODIUM LACTATE AND CALCIUM CHLORIDE 1000 ML: 600; 310; 30; 20 INJECTION, SOLUTION INTRAVENOUS at 04:09

## 2023-09-19 RX ADMIN — CEFTRIAXONE SODIUM 1 G: 1 INJECTION, POWDER, FOR SOLUTION INTRAMUSCULAR; INTRAVENOUS at 04:09

## 2023-09-19 NOTE — ED PROVIDER NOTES
Encounter Date: 9/19/2023       History     Chief Complaint   Patient presents with    Dizziness     Dizziness, loosing balance x a few days     63-year-old female presents to ED for evaluation dizziness worsening over the last few days.  Patient states that she is been feeling balance since she is unable to walk without holding the wall.  Denies any headache or blurry vision.  Denies any focal weakness.  Patient states she is been having intermittent symptoms the 3-4 months.  Patient denies falling or hitting her head.  Patient also complains of cough congestion and sore throat.  Complains of right ear pain.  Denies any shortness of breath or chest pain.  Patient with history of anxiety depression.  States she does not a PCP anymore.    The history is provided by the patient. No  was used.     Review of patient's allergies indicates:   Allergen Reactions    Pcn [penicillins] Rash     Past Medical History:   Diagnosis Date    ADHD     ADHD (attention deficit hyperactivity disorder)     Anxiety     Aphthous ulcer     Asthma     Constipation     Depression     Depression     Dysphagia     Epigastric pain     Fatigue     Fatigue     Folate deficiency     Generalized anxiety disorder     GERD (gastroesophageal reflux disease)     Hx of psychiatric care     Macrocytosis     Obesity, unspecified     Psychiatric problem      Past Surgical History:   Procedure Laterality Date    APPENDECTOMY      COLONOSCOPY N/A 5/11/2022    Procedure: COLONOSCOPY;  Surgeon: Damion Martinez MD;  Location: [facility] ENDOSCOPY;  Service: Gastroenterology;  Laterality: N/A;    HYSTERECTOMY      LAPAROSCOPIC CHOLECYSTECTOMY      NASAL SINUS SURGERY      TONSILLECTOMY       Family History   Problem Relation Age of Onset    Drug abuse Brother      Social History     Tobacco Use    Smoking status: Former    Smokeless tobacco: Never   Substance Use Topics    Alcohol use: No    Drug use: No     Review of Systems    Constitutional:  Positive for fatigue. Negative for chills and fever.   HENT:  Positive for congestion, ear pain and sore throat.    Respiratory:  Positive for cough. Negative for shortness of breath.    Cardiovascular:  Negative for chest pain.   Gastrointestinal:  Negative for abdominal pain, diarrhea, nausea and vomiting.   Genitourinary:  Negative for dysuria, flank pain, frequency and urgency.   Musculoskeletal:  Negative for back pain and myalgias.   Neurological:  Positive for dizziness.   All other systems reviewed and are negative.      Physical Exam     Initial Vitals [09/19/23 1157]   BP Pulse Resp Temp SpO2   (!) 146/88 84 20 98 °F (36.7 °C) 98 %      MAP       --         Physical Exam    Vitals reviewed.  Constitutional: She appears well-developed.   HENT:   Head: Normocephalic and atraumatic.   Right Ear: Tympanic membrane and external ear normal.   Left Ear: Tympanic membrane and external ear normal.   Mouth/Throat: Uvula is midline, oropharynx is clear and moist and mucous membranes are normal. No trismus in the jaw. No uvula swelling. No oropharyngeal exudate, posterior oropharyngeal edema or posterior oropharyngeal erythema.   Eyes: Conjunctivae are normal. Pupils are equal, round, and reactive to light.   Neck: Neck supple.   Normal range of motion.  Cardiovascular:  Normal rate, regular rhythm and normal heart sounds.           Pulmonary/Chest: Breath sounds normal. She has no wheezes. She has no rhonchi. She has no rales.   Abdominal: Abdomen is soft. Bowel sounds are normal. There is no abdominal tenderness. There is no rebound and no guarding.   Musculoskeletal:         General: Normal range of motion.      Cervical back: Normal range of motion and neck supple.     Neurological: She is alert and oriented to person, place, and time. She has normal strength. No cranial nerve deficit or sensory deficit.   Skin: Skin is warm and dry.   Psychiatric: She has a normal mood and affect.         ED  Course   Procedures  Labs Reviewed   COMPREHENSIVE METABOLIC PANEL - Abnormal; Notable for the following components:       Result Value    Chloride 109 (*)     All other components within normal limits   PROTIME-INR - Abnormal; Notable for the following components:    PT 12.1 (*)     All other components within normal limits   CBC WITH DIFFERENTIAL - Abnormal; Notable for the following components:    MCV 95.9 (*)     MCH 31.7 (*)     All other components within normal limits   URINALYSIS, REFLEX TO URINE CULTURE - Abnormal; Notable for the following components:    Appearance, UA Cloudy (*)     Leukocyte Esterase, UA 3+ (*)     All other components within normal limits   URINALYSIS, MICROSCOPIC - Abnormal; Notable for the following components:    WBC, UA 50-99 (*)     Bacteria, UA Many (*)     All other components within normal limits   TROPONIN I - Normal   COVID/FLU A&B PCR - Normal    Narrative:     The Xpert Xpress SARS-CoV-2/FLU/RSV plus is a rapid, multiplexed real-time PCR test intended for the simultaneous qualitative detection and differentiation of SARS-CoV-2, Influenza A, Influenza B, and respiratory syncytial virus (RSV) viral RNA in either nasopharyngeal swab or nasal swab specimens.         STREP GROUP A BY PCR - Normal    Narrative:     The Xpert Xpress Strep A test is a rapid, qualitative in vitro diagnostic test for the detection of Streptococcus pyogenes (Group A ß-hemolytic Streptococcus, Strep A) in throat swab specimens from patients with signs and symptoms of pharyngitis.     CULTURE, URINE   CBC W/ AUTO DIFFERENTIAL    Narrative:     The following orders were created for panel order CBC Auto Differential.  Procedure                               Abnormality         Status                     ---------                               -----------         ------                     CBC with Differential[[id number]]       Abnormal            Final result                 Please view results for these  tests on the individual orders.   C-REACTIVE PROTEIN   DRUG SCREEN, URINE (BEAKER)   SEDIMENTATION RATE     EKG Readings: (Independently Interpreted)   Initial Reading: No STEMI. Rhythm: Normal Sinus Rhythm. Heart Rate: 81. Ectopy: No Ectopy. Conduction: Normal and RBBB. ST Segments: Normal ST Segments. T Waves: Normal. Clinical Impression: Normal Sinus Rhythm Other Impression: Normal sinus rhythm at rate of 81     ECG Results              EKG 12-lead (Final result)  Result time 09/19/23 12:59:30      Final result by Interface, Lab In [facility] (09/19/23 12:59:30)                   Narrative:    Test Reason : R42,    Vent. Rate : 081 BPM     Atrial Rate : 081 BPM     P-R Int : 154 ms          QRS Dur : 098 ms      QT Int : 370 ms       P-R-T Axes : 057 -06 046 degrees     QTc Int : 429 ms    Normal sinus rhythm  Incomplete right bundle branch block  Borderline Abnormal ECG  No previous ECGs available  Confirmed by Juan Manuel Mack MD ([id number]) on 9/19/2023 12:59:23 PM    Referred By:             Confirmed By:Juan Manuel Mack MD                                  Imaging Results              CT Head Without Contrast (Final result)  Result time 09/19/23 12:56:52      Final result by Pablo Varela MD (09/19/23 12:56:52)                   Impression:      No acute intracranial findings.      Electronically signed by: Pablo Varela  Date:    09/19/2023  Time:    12:56               Narrative:    EXAMINATION:  CT HEAD WITHOUT CONTRAST    CLINICAL HISTORY:  Dizziness, persistent/recurrent, cardiac or vascular cause suspected;    TECHNIQUE:  CT imaging of the head performed from the skull base to the vertex without intravenous contrast.  mGycm. Automatic exposure control, adjustment of mA/kV or iterative reconstruction technique was used to reduce radiation.    COMPARISON:  15 March 2020    FINDINGS:  There is no acute cortical infarct, hemorrhage or mass lesion.  There is no new parenchymal attenuation abnormality.   Ventricular size is stable.    Visualized paranasal sinuses and mastoid air cells are clear.                                       Medications   lactated ringers bolus 1,000 mL (0 mLs Intravenous Stopped 9/19/23 1745)   cefTRIAXone (ROCEPHIN) 1 g in dextrose 5 % in water (D5W) 100 mL IVPB (MB+) (0 g Intravenous Stopped 9/19/23 1715)   meclizine tablet 25 mg (25 mg Oral Given 9/19/23 1642)     Medical Decision Making  63-year-old female presents to ED for evaluation dizziness worsening over the last few days.  Patient states that she is been feeling balance since she is unable to walk without holding the wall.  Denies any headache or blurry vision.  Denies any focal weakness.  Patient states she is been having intermittent symptoms the 3-4 months.  Patient denies falling or hitting her head.  Patient also complains of cough congestion and sore throat.  Complains of right ear pain.  Patient with history of anxiety depression.  States she does not a PCP anymore.    Amount and/or Complexity of Data Reviewed  External Data Reviewed: notes.  Labs: ordered. Decision-making details documented in ED Course.  Radiology: ordered.  ECG/medicine tests: ordered and independent interpretation performed.  Discussion of management or test interpretation with external provider(s): Patient afebrile and in no acute distress.  GCS 15 and neuro intact.  Patient presents to ED for evaluation of dizziness and feeling off balance.  Patient has multiple complaints including cough, congestion and ulcers in her mouth.  Patient states that she is had intermittent symptoms over the last several months worsening over the past 3 days.  Patient denies any trauma or injury.  Denies any focal weakness.  CT head obtained negative.  Labs unremarkable.  UA positive for infection.  Given Rocephin and IV fluids.  Give meclizine for dizziness.  After meclizine patient still having trouble with dizziness unable to walk without holding onto the wall.   Discussed case with ED attending Dr. Valdivia, recommends CTA and admission for posterior stroke workup.  Discussed with hospital Medicine, Raza ADAN, will admit for further evaluation.               ED Course as of 09/19/23 1845   Tue Sep 19, 2023   1815 STREP A PCR ([facility]): Not Detected [SL]   1815 Patient unable to walk to bathroom without leaning to left and holding wall.  [SL]   1837 Bacteria, UA(!): Many [SL]   1837 WBC, UA(!): 50-99 [SL]   1837 Leukocytes, UA(!): 3+ [SL]   1837 WBC: 4.82 [SL]   1837 Sodium: 141 [SL]   1837 Potassium: 4.5 [SL]   1837 Creatinine: 0.94 [SL]   1837 Influenza A, Molecular: Not Detected [SL]   1837 Influenza B, Molecular: Not Detected [SL]   1837 SARS-CoV2 (COVID-19) Qualitative PCR: Not Detected [SL]      ED Course User Index  [SL] Kelly Wheeler PA                    Clinical Impression:   Final diagnoses:  [R42] Dizziness  [I63.9] CVA (cerebral vascular accident)        ED Disposition Condition    Admit Stable                Kelly Wheeler PA  09/19/23 1845

## 2023-09-19 NOTE — Clinical Note
Diagnosis: Dizziness [859773]   Admitting Provider:: RENA DIEHL [[id number]]   Future Attending Provider: RENA DIEHL [[id number]]   Reason for IP Medical Treatment  (Clinical interventions that can only be accomplished in the IP setting? ) :: MRI, stroke workup   I certify that Inpatient services for greater than or equal to 2 midnights are medically necessary:: Yes   Plans for Post-Acute care--if anticipated (pick the single best option):: A. No post acute care anticipated at this time

## 2023-09-19 NOTE — FIRST PROVIDER EVALUATION
Medical screening examination initiated.  I have conducted a focused provider triage encounter, findings are as follows:    Brief history of present illness:  Patient states dizziness. States that she fell due to dizziness causing her to lose her balance.     There were no vitals filed for this visit.    Pertinent physical exam:  Awake, alert, ambulatory    Brief workup plan:  Labs, EKG, Imaging     Preliminary workup initiated; this workup will be continued and followed by the physician or advanced practice provider that is assigned to the patient when roomed.

## 2023-09-19 NOTE — ED NOTES
Patient reports intermittent episodes of feeling dizzy. Patient describes dizziness as feeling off balance when she ambulates, states that she does not feel like the room is spinning. No obvious neuro deficits noted. Patient ambulated to and from restroom with steady gait and no assistance.

## 2023-09-20 VITALS
HEART RATE: 73 BPM | BODY MASS INDEX: 32.32 KG/M2 | RESPIRATION RATE: 17 BRPM | OXYGEN SATURATION: 98 % | SYSTOLIC BLOOD PRESSURE: 149 MMHG | DIASTOLIC BLOOD PRESSURE: 81 MMHG | HEIGHT: 65 IN | WEIGHT: 194 LBS | TEMPERATURE: 99 F
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
## 2023-09-20 PROBLEM — R29.898 TRANSIENT LEG WEAKNESS: Status: ACTIVE | Noted: 2023-09-20

## 2023-09-20 PROBLEM — F41.0 ANXIETY ATTACK: Status: ACTIVE | Noted: 2023-09-20

## 2023-09-20 PROBLEM — R29.6 FALLS: Status: ACTIVE | Noted: 2023-09-20

## 2023-09-20 LAB
ALBUMIN SERPL-MCNC: 3 G/DL (ref 3.4–4.8)
ALBUMIN/GLOB SERPL: 1.4 RATIO (ref 1.1–2)
ALP SERPL-CCNC: 71 UNIT/L (ref 40–150)
ALT SERPL-CCNC: 17 UNIT/L (ref 0–55)
AST SERPL-CCNC: 16 UNIT/L (ref 5–34)
AV INDEX (PROSTH): 0.83
AV MEAN GRADIENT: 3 MMHG
AV PEAK GRADIENT: 5 MMHG
AV VALVE AREA BY VELOCITY RATIO: 2.54 CM²
AV VALVE AREA: 2.6 CM²
AV VELOCITY RATIO: 0.81
BASOPHILS # BLD AUTO: 0.03 X10(3)/MCL
BASOPHILS NFR BLD AUTO: 0.8 %
BILIRUB SERPL-MCNC: 0.3 MG/DL
BSA FOR ECHO PROCEDURE: 2.01 M2
BUN SERPL-MCNC: 10.1 MG/DL (ref 9.8–20.1)
CALCIUM SERPL-MCNC: 8.4 MG/DL (ref 8.4–10.2)
CHLORIDE SERPL-SCNC: 110 MMOL/L (ref 98–107)
CHOLEST SERPL-MCNC: 187 MG/DL
CHOLEST/HDLC SERPL: 4 {RATIO} (ref 0–5)
CO2 SERPL-SCNC: 23 MMOL/L (ref 23–31)
CREAT SERPL-MCNC: 0.87 MG/DL (ref 0.55–1.02)
CV ECHO LV RWT: 0.47 CM
DOP CALC AO PEAK VEL: 1.15 M/S
DOP CALC AO VTI: 21.9 CM
DOP CALC LVOT AREA: 3.1 CM2
DOP CALC LVOT DIAMETER: 2 CM
DOP CALC LVOT PEAK VEL: 0.93 M/S
DOP CALC LVOT STROKE VOLUME: 56.83 CM3
DOP CALC MV VTI: 29.4 CM
DOP CALCLVOT PEAK VEL VTI: 18.1 CM
E WAVE DECELERATION TIME: 211 MSEC
E/A RATIO: 1.11
E/E' RATIO: 7.41 M/S
ECHO LV POSTERIOR WALL: 1.02 CM (ref 0.6–1.1)
EOSINOPHIL # BLD AUTO: 0.16 X10(3)/MCL (ref 0–0.9)
EOSINOPHIL NFR BLD AUTO: 4.1 %
ERYTHROCYTE [DISTWIDTH] IN BLOOD BY AUTOMATED COUNT: 13 % (ref 11.5–17)
EST. AVERAGE GLUCOSE BLD GHB EST-MCNC: 102.5 MG/DL
FRACTIONAL SHORTENING: 30 % (ref 28–44)
GFR SERPLBLD CREATININE-BSD FMLA CKD-EPI: >60 MLS/MIN/1.73/M2
GLOBULIN SER-MCNC: 2.2 GM/DL (ref 2.4–3.5)
GLUCOSE SERPL-MCNC: 86 MG/DL (ref 82–115)
HBA1C MFR BLD: 5.2 %
HCT VFR BLD AUTO: 38.7 % (ref 37–47)
HDLC SERPL-MCNC: 48 MG/DL (ref 35–60)
HGB BLD-MCNC: 12.7 G/DL (ref 12–16)
IMM GRANULOCYTES # BLD AUTO: 0.01 X10(3)/MCL (ref 0–0.04)
IMM GRANULOCYTES NFR BLD AUTO: 0.3 %
INTERVENTRICULAR SEPTUM: 1.08 CM (ref 0.6–1.1)
LDLC SERPL CALC-MCNC: 122 MG/DL (ref 50–140)
LEFT ATRIUM SIZE: 3.3 CM
LEFT ATRIUM VOLUME INDEX MOD: 18.2 ML/M2
LEFT ATRIUM VOLUME MOD: 35.4 CM3
LEFT CCA DIST DIAS: 14 CM/S
LEFT CCA DIST SYS: 63 CM/S
LEFT CCA PROX DIAS: 18 CM/S
LEFT CCA PROX SYS: 76 CM/S
LEFT ECA DIAS: 8 CM/S
LEFT ECA SYS: 43 CM/S
LEFT ICA DIST DIAS: 31 CM/S
LEFT ICA DIST SYS: 88 CM/S
LEFT ICA MID DIAS: 15 CM/S
LEFT ICA MID SYS: 48 CM/S
LEFT ICA PROX DIAS: 5 CM/S
LEFT ICA PROX SYS: 33 CM/S
LEFT INTERNAL DIMENSION IN SYSTOLE: 3.05 CM (ref 2.1–4)
LEFT VENTRICLE DIASTOLIC VOLUME INDEX: 43.54 ML/M2
LEFT VENTRICLE DIASTOLIC VOLUME: 84.9 ML
LEFT VENTRICLE MASS INDEX: 79 G/M2
LEFT VENTRICLE SYSTOLIC VOLUME INDEX: 18.7 ML/M2
LEFT VENTRICLE SYSTOLIC VOLUME: 36.4 ML
LEFT VENTRICULAR INTERNAL DIMENSION IN DIASTOLE: 4.34 CM (ref 3.5–6)
LEFT VENTRICULAR MASS: 154.81 G
LEFT VERTEBRAL DIAS: 13 CM/S
LEFT VERTEBRAL SYS: 44 CM/S
LV LATERAL E/E' RATIO: 6.3 M/S
LV SEPTAL E/E' RATIO: 9 M/S
LVOT MG: 2 MMHG
LVOT MV: 0.62 CM/S
LYMPHOCYTES # BLD AUTO: 1.46 X10(3)/MCL (ref 0.6–4.6)
LYMPHOCYTES NFR BLD AUTO: 37.1 %
MCH RBC QN AUTO: 31.5 PG (ref 27–31)
MCHC RBC AUTO-ENTMCNC: 32.8 G/DL (ref 33–36)
MCV RBC AUTO: 96 FL (ref 80–94)
MONOCYTES # BLD AUTO: 0.35 X10(3)/MCL (ref 0.1–1.3)
MONOCYTES NFR BLD AUTO: 8.9 %
MV MEAN GRADIENT: 2 MMHG
MV PEAK A VEL: 0.57 M/S
MV PEAK E VEL: 0.63 M/S
MV PEAK GRADIENT: 3 MMHG
MV STENOSIS PRESSURE HALF TIME: 60 MS
MV VALVE AREA BY CONTINUITY EQUATION: 1.93 CM2
MV VALVE AREA P 1/2 METHOD: 3.67 CM2
NEUTROPHILS # BLD AUTO: 1.93 X10(3)/MCL (ref 2.1–9.2)
NEUTROPHILS NFR BLD AUTO: 48.8 %
NRBC BLD AUTO-RTO: 0 %
OHS CV CAROTID RIGHT ICA EDV HIGHEST: 25
OHS CV CAROTID ULTRASOUND LEFT ICA/CCA RATIO: 1.4
OHS CV CAROTID ULTRASOUND RIGHT ICA/CCA RATIO: 1.48
OHS CV PV CAROTID LEFT HIGHEST CCA: 76
OHS CV PV CAROTID LEFT HIGHEST ICA: 88
OHS CV PV CAROTID RIGHT HIGHEST CCA: 70
OHS CV PV CAROTID RIGHT HIGHEST ICA: 86
OHS CV US CAROTID LEFT HIGHEST EDV: 31
OHS LV EJECTION FRACTION SIMPSONS BIPLANE MOD: 54 %
PISA TR MAX VEL: 0.89 M/S
PLATELET # BLD AUTO: 229 X10(3)/MCL (ref 130–400)
PMV BLD AUTO: 10 FL (ref 7.4–10.4)
POTASSIUM SERPL-SCNC: 4.1 MMOL/L (ref 3.5–5.1)
PROT SERPL-MCNC: 5.2 GM/DL (ref 5.8–7.6)
PV PEAK GRADIENT: 3 MMHG
PV PEAK VELOCITY: 0.83 M/S
RBC # BLD AUTO: 4.03 X10(6)/MCL (ref 4.2–5.4)
RIGHT CCA DIST DIAS: 15 CM/S
RIGHT CCA DIST SYS: 58 CM/S
RIGHT CCA PROX DIAS: 13 CM/S
RIGHT CCA PROX SYS: 70 CM/S
RIGHT ECA DIAS: 8 CM/S
RIGHT ECA SYS: 60 CM/S
RIGHT ICA DIST DIAS: 25 CM/S
RIGHT ICA DIST SYS: 86 CM/S
RIGHT ICA MID DIAS: 12 CM/S
RIGHT ICA MID SYS: 42 CM/S
RIGHT ICA PROX DIAS: 11 CM/S
RIGHT ICA PROX SYS: 39 CM/S
RIGHT VENTRICULAR END-DIASTOLIC DIMENSION: 1.68 CM
RIGHT VERTEBRAL DIAS: 15 CM/S
RIGHT VERTEBRAL SYS: 57 CM/S
SODIUM SERPL-SCNC: 141 MMOL/L (ref 136–145)
TDI LATERAL: 0.1 M/S
TDI SEPTAL: 0.07 M/S
TDI: 0.09 M/S
TR MAX PG: 3 MMHG
TRICUSPID ANNULAR PLANE SYSTOLIC EXCURSION: 2 CM
TRIGL SERPL-MCNC: 83 MG/DL (ref 37–140)
VLDLC SERPL CALC-MCNC: 17 MG/DL
WBC # SPEC AUTO: 3.94 X10(3)/MCL (ref 4.5–11.5)
Z-SCORE OF LEFT VENTRICULAR DIMENSION IN END DIASTOLE: -2.48
Z-SCORE OF LEFT VENTRICULAR DIMENSION IN END SYSTOLE: -0.9

## 2023-09-20 PROCEDURE — 25000003 PHARM REV CODE 250: Performed by: INTERNAL MEDICINE

## 2023-09-20 PROCEDURE — 99222 PR INITIAL HOSPITAL CARE,LEVL II: ICD-10-PCS | Mod: ,,, | Performed by: PSYCHIATRY & NEUROLOGY

## 2023-09-20 PROCEDURE — 80061 LIPID PANEL: CPT | Performed by: PHYSICIAN ASSISTANT

## 2023-09-20 PROCEDURE — 92523 SPEECH SOUND LANG COMPREHEN: CPT

## 2023-09-20 PROCEDURE — 85025 COMPLETE CBC W/AUTO DIFF WBC: CPT | Performed by: PHYSICIAN ASSISTANT

## 2023-09-20 PROCEDURE — 99222 1ST HOSP IP/OBS MODERATE 55: CPT | Mod: ,,, | Performed by: PSYCHIATRY & NEUROLOGY

## 2023-09-20 PROCEDURE — 83036 HEMOGLOBIN GLYCOSYLATED A1C: CPT | Performed by: PHYSICIAN ASSISTANT

## 2023-09-20 PROCEDURE — 25000003 PHARM REV CODE 250: Performed by: NURSE PRACTITIONER

## 2023-09-20 PROCEDURE — 80053 COMPREHEN METABOLIC PANEL: CPT | Performed by: PHYSICIAN ASSISTANT

## 2023-09-20 PROCEDURE — 97161 PT EVAL LOW COMPLEX 20 MIN: CPT

## 2023-09-20 PROCEDURE — 97165 OT EVAL LOW COMPLEX 30 MIN: CPT

## 2023-09-20 RX ORDER — CLONAZEPAM 1 MG/1
1 TABLET ORAL 3 TIMES DAILY
Status: DISCONTINUED | OUTPATIENT
Start: 2023-09-20 | End: 2023-09-20 | Stop reason: HOSPADM

## 2023-09-20 RX ORDER — CEFDINIR 300 MG/1
300 CAPSULE ORAL 2 TIMES DAILY
Qty: 6 CAPSULE | Refills: 0 | Status: SHIPPED | OUTPATIENT
Start: 2023-09-20 | End: 2023-09-23

## 2023-09-20 RX ORDER — BUTALBITAL, ACETAMINOPHEN AND CAFFEINE 50; 325; 40 MG/1; MG/1; MG/1
2 TABLET ORAL EVERY 6 HOURS PRN
Status: DISCONTINUED | OUTPATIENT
Start: 2023-09-20 | End: 2023-09-20 | Stop reason: HOSPADM

## 2023-09-20 RX ADMIN — FINASTERIDE 5 MG: 5 TABLET, FILM COATED ORAL at 09:09

## 2023-09-20 RX ADMIN — CLONAZEPAM 1 MG: 1 TABLET ORAL at 11:09

## 2023-09-20 RX ADMIN — BUTALBITAL, ACETAMINOPHEN, AND CAFFEINE 2 TABLET: 325; 50; 40 TABLET ORAL at 11:09

## 2023-09-20 RX ADMIN — BUPROPION HYDROCHLORIDE 150 MG: 150 TABLET, FILM COATED, EXTENDED RELEASE ORAL at 09:09

## 2023-09-20 NOTE — PT/OT/SLP EVAL
"Physical Therapy Evaluation and Discharge Note    Patient Name:  Selena Cunningham   MRN:  [id number]    Recommendations:     Discharge Recommendations:  home  Discharge Equipment Recommendations: none   Barriers to discharge: Ongoing medical needs    Assessment:     Selena Cunningham is a 63 y.o. female admitted a medical diagnosis of questionable TIA, UTI, and dizziness.   At this time, patient is functioning at their prior level of function and does not require further acute PT services. Pt demo independence with sit to stand tranfers and was able to amb about 176ft SBA with no AD demo no mobility or safety concerns.     Recent Surgery: * No surgery found *      Plan:     During this hospitalization, patient does not require further acute PT services.  Please re-consult if situation changes.      Subjective     Chief Complaint: unexpected falls  Patient/Family Comments/goals: to not lose her balance   Pain/Comfort: No px reported, no c/o of dizziness    Patients cultural, spiritual, Congregation conflicts given the current situation: no    Living Environment:  Pt lives with her son who is disabled in a single level home with 3 steps upon entry. Pt reported that her son built her some type of aid to assist her up the stairs, "knob".  Prior to admission, patients level of function was Independent.  Equipment used at home: none.  DME owned (not currently used): none.  Upon discharge, patient will have assistance from family.    Objective:      Patient found up in chair with  peripheral IV upon PT entry to room.    General Precautions: Standard,      Respiratory Status: Room air      Exams:  Cognitive Exam:  Patient is oriented to Person, Place, Time, and Situation  Gross Motor Coordination: WNL (Heel to shin, alternating foot taps, finger to therapist)  BLE ROM: WNL  BLE Strength: WNL    Functional Mobility:  Transfers:     Sit to Stand:  independence with no AD  Gait: Pt amb about 176ft SBA with no AD  demo steady step " through gait pattern and no LOB noted. PT pushed IV.       Treatment and Education:  Pt educated on no mobility concerns. Educated pt to contact NSG when needing to mobilize in the room for lines safety.     Patient provided with verbal education education regarding POC, mobility, and safety.  Understanding was verbalized.     Patient left up in chair with all lines intact.    GOALS:   Multidisciplinary Problems       Physical Therapy Goals       Not on file                    History:     Past Medical History:   Diagnosis Date    ADHD     ADHD (attention deficit hyperactivity disorder)     Anxiety     Aphthous ulcer     Asthma     Constipation     Depression     Depression     Dysphagia     Epigastric pain     Fatigue     Fatigue     Folate deficiency     Generalized anxiety disorder     GERD (gastroesophageal reflux disease)     Hx of psychiatric care     Macrocytosis     Obesity, unspecified     Psychiatric problem        Past Surgical History:   Procedure Laterality Date    APPENDECTOMY      COLONOSCOPY N/A 5/11/2022    Procedure: COLONOSCOPY;  Surgeon: Damion Martinez MD;  Location: [facility] ENDOSCOPY;  Service: Gastroenterology;  Laterality: N/A;    HYSTERECTOMY      LAPAROSCOPIC CHOLECYSTECTOMY      NASAL SINUS SURGERY      TONSILLECTOMY         Time Tracking:     PT Received On: 09/20/23  PT Start Time: 1032     PT Stop Time: 1046  PT Total Time (min): 14 min     Billable Minutes: Evaluation low      09/20/2023

## 2023-09-20 NOTE — PT/OT/SLP EVAL
Occupational Therapy   Evaluation and Discharge Note    Name: Selena Cunningham  MRN: [id number]  Admitting Diagnosis: Transient leg weakness  Recent Surgery: * No surgery found *      Recommendations:     Discharge Recommendations: home  Discharge Equipment Recommendations: shower chair  Barriers to discharge:       Assessment:     Selena Cunningham is a 63 y.o. female with a medical diagnosis of Transient leg weakness, possible TIA, weakness, UTI, dysphagia. At this time, patient is functioning at their prior level of function and does not require further acute OT services.       Plan:     During this hospitalization, patient does not require further acute OT services.  Please re-consult if situation changes.    Plan of Care Reviewed with: patient    Subjective         Occupational Profile:  Living Environment: pt lives with son, 3 steps to enter with rails, tub/shower   Previous level of function: indep   Roles and Routines: pt on disability for mental health  Equipment Used at Home: none  Assistance upon Discharge: son if needed     Pain/Comfort:  Pain Rating 1: 8/10  Location 1: head  Pain Addressed 1: Nurse notified    Patients cultural, spiritual, Yarsanism conflicts given the current situation:      Objective:     OT communicated with nrsg prior to session.      Patient was found HOB elevated with   upon OT entry to room.    General Precautions: Standard,    Orthopedic Precautions:    Braces:      Vital Signs: Blood Pressure: 136/92  HR: 77    Bed Mobility:    Patient completed Supine to Sit with independence    Functional Mobility/Transfers:  Patient completed Toilet Transfer Step Transfer technique with independence with  no AD  Functional Mobility: no LOB     Activities of Daily Living:  Lower Body Dressing: independence    Toileting: independence        Functional Cognition:  Affect: Tearful  Orientation: oriented to Person, Place, Time, and Situation    Visual Perceptual Skills:  Intact    Upper Extremity  Function:  Right Upper Extremity:   WFL    Left Upper Extremity:  WFL    Balance:   Intact    Therapeutic Positioning  Risk for acquired pressure injuries is decreased due to ability to mobilize independently .    OT interventions performed during the course of today's session in an effort to prevent and/or reduce acquired pressure injuries:   Therapeutic positioning completed     No pressure related skin breakdown noted at this time       Patient Education:  Patient provided with verbal education education regarding OT role/goals/POC, fall prevention, safety awareness, and Discharge/DME recommendations.  Understanding was verbalized.     Patient left up in chair with all lines intact, call button in reach, and nursing  notified    GOALS:   Multidisciplinary Problems       Occupational Therapy Goals       Not on file                    History:     Past Medical History:   Diagnosis Date    ADHD     ADHD (attention deficit hyperactivity disorder)     Anxiety     Aphthous ulcer     Asthma     Constipation     Depression     Depression     Dysphagia     Epigastric pain     Fatigue     Fatigue     Folate deficiency     Generalized anxiety disorder     GERD (gastroesophageal reflux disease)     Hx of psychiatric care     Macrocytosis     Obesity, unspecified     Psychiatric problem          Past Surgical History:   Procedure Laterality Date    APPENDECTOMY      COLONOSCOPY N/A 5/11/2022    Procedure: COLONOSCOPY;  Surgeon: Damion Martinez MD;  Location: [facility] ENDOSCOPY;  Service: Gastroenterology;  Laterality: N/A;    HYSTERECTOMY      LAPAROSCOPIC CHOLECYSTECTOMY      NASAL SINUS SURGERY      TONSILLECTOMY         Time Tracking:     OT Date of Treatment:    OT Start Time: 0946  OT Stop Time: 1022  OT Total Time (min): 36 min    Billable Minutes:Evaluation Low Complexity     9/20/2023

## 2023-09-20 NOTE — H&P
[facility] Hospital Medicine - H&P Note    Patient Name: Selena Cunningham  : 1960  MRN: [id number]  PCP: No, Primary Doctor  Admitting Physician: GLENDA Viera MD   Admission Class: IP- Inpatient   Code status: -ull     Chief Complaint   Unsteady gait    History of Present Illness   63-year-old female whose history includes anxiety, depression and ADD.  Presents to the emergency room with complaints of intermittent episodes of unsteady gait and loss of balance.  Symptoms have been ongoing for the last approximate 1 year but have become more frequent over the last 2 weeks.  She denies symptoms of dizziness or room spinning.  Describes that her limbs become uncoordinated and it feels as though she is not picking up her foot high enough to take a step.  Denies any chest pain, shortness of breath, palpitations or weakness of extremities.  Also reports a 1 year history of episodic throat discomfort where it feels as though her food gets stuck.  She did undergo an EGD approximately but she can not recall the physician who performed the procedure nor the facility where it was done.  Reports she had some blisters repaired.    Vital signs stable.  Vital signs unremarkable.  Swab for COVID and influenza a/B negative.  UA shows evidence of a UTI but patient denies any symptoms of dysuria, frequency or difficulty urinating.  CT head without contrast negative for acute intracranial findings.  MRA head and neck shows no large vessel hemodynamically significant stenosis or occlusion.  MRI brain without shows chronic microangiopathic ischemia and atrophy with no acute intracranial findings.    ROS   Except as documented, all other systems reviewed and negative     Past Medical History   Anxiety  Depression   ADHD  Dysphagia  Recurrent UTIs  Past Surgical History   Hysterectomy  Cholecystectomy  Appendectomy  Bilateral cataract extraction   Tonsillectomy  Right lower extremity peripheral venous  stents  Social History   Denies alcohol, tobacco, or illicit drug use.  Family History   Reviewed and negative    Home Medications     No current facility-administered medications on file prior to encounter.     Current Outpatient Medications on File Prior to Encounter   Medication Sig Dispense Refill    buPROPion (WELLBUTRIN XL) 150 MG TB24 tablet Take 150 mg by mouth once daily.      clonazePAM (KLONOPIN) 1 MG tablet Take 1 mg by mouth 2 (two) times daily as needed for Anxiety.      clopidogreL (PLAVIX) 75 mg tablet Take 75 mg by mouth once daily.      diphenhydrAMINE (BENADRYL) 50 MG capsule Take 50 mg by mouth every 6 (six) hours as needed for Itching.      finasteride (PROSCAR) 5 mg tablet Take 5 mg by mouth once daily.      folic acid (FOLVITE) 1 MG tablet Take 1 mg by mouth once daily.      folic acid-vit B6-vit B12 2.5-25-2 mg (FOLBIC OR EQUIV) 2.5-25-2 mg Tab Take 1 tablet by mouth once daily. 30 tablet 1    liothyronine (CYTOMEL) 5 MCG Tab Take 2 tablets (10 mcg total) by mouth once daily. 60 tablet 1    methylphenidate HCl (RITALIN) 20 MG tablet Take 20 mg by mouth 2 (two) times daily.      minoxidiL (LONITEN) 2.5 MG tablet Take 2.5 mg by mouth once daily.      mometasone-formoterol (DULERA) 200-5 mcg/actuation inhaler Inhale 2 puffs into the lungs 2 (two) times daily. Controller      oxybutynin (DITROPAN-XL) 10 MG 24 hr tablet Take 10 mg by mouth once daily.      pantoprazole (PROTONIX) 40 MG tablet Take 40 mg by mouth once daily.      quetiapine (SEROQUEL) 200 MG Tab Take 1 tablet (200 mg total) by mouth every evening. 30 tablet 1    vilazodone (VIIBRYD) 20 mg Tab Take 20 mg by mouth Daily.      zolpidem (AMBIEN) 10 mg Tab Take 10 mg by mouth nightly as needed.      [DISCONTINUED] dextroamphetamine-amphetamine (ADDERALL XR) 30 MG 24 hr capsule Take 30 mg by mouth every morning.      [DISCONTINUED] diazePAM (VALIUM) 5 MG tablet Take 5 mg by mouth every 6 (six) hours as needed for Anxiety.       [DISCONTINUED] lisdexamfetamine (VYVANSE) 70 MG capsule Take 70 mg by mouth every morning.      [DISCONTINUED] meloxicam (MOBIC) 15 MG tablet Take 15 mg by mouth once daily.      [DISCONTINUED] vortioxetine (TRINTELLIX) 20 mg Tab Take 20 mg by mouth.        Prior to Admission medications    Medication Sig Start Date End Date Taking? Authorizing Provider   buPROPion (WELLBUTRIN XL) 150 MG TB24 tablet Take 150 mg by mouth once daily.    Provider, Historical   clonazePAM (KLONOPIN) 1 MG tablet Take 1 mg by mouth 2 (two) times daily as needed for Anxiety.    Provider, Historical   clopidogreL (PLAVIX) 75 mg tablet Take 75 mg by mouth once daily.    Provider, Historical   diphenhydrAMINE (BENADRYL) 50 MG capsule Take 50 mg by mouth every 6 (six) hours as needed for Itching.    Provider, Historical   folic acid (FOLVITE) 1 MG tablet Take 1 mg by mouth once daily.    Provider, Historical   folic acid-vit B6-vit B12 2.5-25-2 mg (FOLBIC OR EQUIV) 2.5-25-2 mg Tab Take 1 tablet by mouth once daily. 12/8/16   Pierre Pederson MD   liothyronine (CYTOMEL) 5 MCG Tab Take 2 tablets (10 mcg total) by mouth once daily. 12/8/16 12/8/17  Pierre Pederson MD   methylphenidate HCl (RITALIN) 20 MG tablet Take 20 mg by mouth 2 (two) times daily.    Provider, Historical   minoxidiL (LONITEN) 2.5 MG tablet Take 2.5 mg by mouth once daily.    Provider, Historical   mometasone-formoterol (DULERA) 200-5 mcg/actuation inhaler Inhale 2 puffs into the lungs 2 (two) times daily. Controller    Provider, Historical   pantoprazole (PROTONIX) 40 MG tablet Take 40 mg by mouth once daily.    Provider, Historical   quetiapine (SEROQUEL) 200 MG Tab Take 1 tablet (200 mg total) by mouth every evening. 12/8/16 12/8/17  Pierre Pederson MD   vilazodone (VIIBRYD) 20 mg Tab Take 20 mg by mouth Daily.    Provider, Historical   zolpidem (AMBIEN) 10 mg Tab Take 10 mg by mouth nightly as needed.    Provider, Historical  "  dextroamphetamine-amphetamine (ADDERALL XR) 30 MG 24 hr capsule Take 30 mg by mouth every morning.  9/19/23  Provider, Historical   diazePAM (VALIUM) 5 MG tablet Take 5 mg by mouth every 6 (six) hours as needed for Anxiety.  9/19/23  Provider, Historical   lisdexamfetamine (VYVANSE) 70 MG capsule Take 70 mg by mouth every morning.  9/19/23  Provider, Historical   meloxicam (MOBIC) 15 MG tablet Take 15 mg by mouth once daily.  9/19/23  Provider, Historical   vortioxetine (TRINTELLIX) 20 mg Tab Take 20 mg by mouth.  9/19/23  Provider, Historical        Physical Exam   Vital Signs  Temp:  [97.8 °F (36.6 °C)-98 °F (36.7 °C)]   Pulse:  [70-84]   Resp:  [16-20]   BP: (142-154)/(72-99)   SpO2:  [98 %-99 %]    General: Appears comfortable  HEENT: NC/AT  Neck:  No JVD  Chest: CTABL  CVS: Regular rhythm. Normal S1/S2.  Abdomen: nondistended, normoactive BS, soft and non-tender.  MSK: No obvious deformity or joint swelling  Skin: Warm and dry  Neuro: AAOx3, no focal neurological deficit  Psych: Cooperative    Labs     Recent Labs     09/19/23  1206 09/19/23  1901   WBC 4.82  --    RBC 4.38  --    HGB 13.9  --    HCT 42.0  --    MCV 95.9*  --    MCH 31.7*  --    MCHC 33.1  --    RDW 12.9  --      --    SEDRATE  --  20   CRP 4.50  --      Recent Labs     09/19/23  1206   PROTIME 12.1*   INR 0.9      Recent Labs     09/19/23  1206      K 4.5   CHLORIDE 109*   CO2 24   BUN 16.9   CREATININE 0.94   EGFRNORACEVR >60   GLUCOSE 96   CALCIUM 8.9   ALBUMIN 3.5   GLOBULIN 2.6   ALKPHOS 85   ALT 12   AST 13   BILITOT 0.2     No results for input(s): "LACTIC" in the last 72 hours.  Recent Labs     09/19/23  1206   TROPONINI <0.010        Microbiology Results (last 7 days)       Procedure Component Value Units Date/Time    Urine culture [[id number]] Collected: 09/19/23 1525    Order Status: Sent Specimen: Urine Updated: 09/19/23 1606           Imaging   MRI Brain Without Contrast  Narrative: EXAMINATION:  MRI BRAIN WITHOUT " CONTRAST    CLINICAL HISTORY:  cva;    TECHNIQUE:  Multiplanar MRI sequences were performed of the brain without contrast.    COMPARISON:  CT brain without contrast September 19, 2023.  MRI brain March 15, 2020    FINDINGS:  Chronic microvascular ischemic changes are mild with periventricular and few deep white matter T2 FLAIR hyperintense signals.  Generalized mild cerebral and cerebellar cortical volume loss.  Gradient echo sequences demonstrate no evidence of de phasing artifact to suggest hemorrhagic byproducts. No evidence of diffusion restriction or ADC map signal drop out to suggest acute infarct. The sella and suprasellar areas are unremarkable.    The cerebellar tonsils are normally positioned. There is no acute intracranial hemorrhage, hydrocephalus, midline shift or mass effect. No acute extra axial fluid collections identified. The mastoid air cells are clear.  Impression: 1.  No acute intracranial findings identified.    2.  Chronic microangiopathic ischemia and atrophy.    .    Electronically signed by: Jose Luis Ruby  Date:    09/19/2023  Time:    20:03  CTA Head and Neck (xpd)  Narrative: EXAMINATION:  CTA HEAD AND NECK (XPD)    CLINICAL HISTORY:  Dizziness, persistent/recurrent, cardiac or vascular cause suspected;    TECHNIQUE:  Helical acquisition through the head and neck without and with IV contrast targeting the arterial phase. 3D MIP reconstructions made available for review. DLP 2949 mGycm. Automatic exposure control, adjustment of mA/kV or iterative reconstruction technique was used to reduce radiation.  NASCET criteria utilized.    COMPARISON:  No priors    FINDINGS:  Three vessel aortic arch.  Great vessels widely patent.  No significant atherosclerotic disease along the cervical carotid arteries which are widely patent.  Cervical vertebral arteries are widely patent.  There is no significant intracranial arterial stenosis or occlusion.  There are mild degenerative changes of the cervical  spine primarily at C5-C6.  Impression: No large vessel hemodynamically significant stenosis or occlusion in the head or neck.    Electronically signed by: Pablo Varela  Date:    09/19/2023  Time:    19:42  CT Head Without Contrast  Narrative: EXAMINATION:  CT HEAD WITHOUT CONTRAST    CLINICAL HISTORY:  Dizziness, persistent/recurrent, cardiac or vascular cause suspected;    TECHNIQUE:  CT imaging of the head performed from the skull base to the vertex without intravenous contrast.  mGycm. Automatic exposure control, adjustment of mA/kV or iterative reconstruction technique was used to reduce radiation.    COMPARISON:  15 March 2020    FINDINGS:  There is no acute cortical infarct, hemorrhage or mass lesion.  There is no new parenchymal attenuation abnormality.  Ventricular size is stable.    Visualized paranasal sinuses and mastoid air cells are clear.  Impression: No acute intracranial findings.    Electronically signed by: Pablo Varela  Date:    09/19/2023  Time:    12:56    Assessment & Plan   Possible TIA, episodes of weakness/gait instability  - no evidence of acute CVA on imaging  - ECHO and carotid US pending  - Lipid panel, Hba1c in AM  - PT/OT/ST  - NPO until cleared by ST  - NS at 75ml/hr  - symptoms may be associated with her psych meds. She's had multiple changes in meds over the last year.     UTI, asymptomatic, POA  - reports hx of recurrent UTIs  - f/u on urine culture  - Continue rocephin    Dysphagia  - will have ST evaluation and if nothing concerning she can f/u outpatient with GI    VTE Prophylaxis: Augustina SCHMIDT, Alix Martins, FNP-BC have discussed this patients case with Dr. Viera who agrees with the diagnosis and treatment plan.    ________________________________________________________________________  I, Dr. Damion Viera assumed care of this patient.  For the patient encounter, I performed the substantive portion of the visit, I reviewed the NPPA documentation, treatment plan,  and medical decision making.  I had face to face time with this patient.  I have personally reviewed the labs and test results that are presently available. I have reviewed or attempted to review medical records based upon their availability. If patient was admitted under observational status it is with my approval.      63-year-old female presents with episodes of unsteady gait/weakness.  Her symptoms are vague and episodic and ongoing for year.  Today she reports she fell onto her couch.  She feels as though she has difficulty lifting and coordinating her legs.  Currently no focal neurologic deficits on exam.  Agree with remainder of above exam.  Reasonable to do a CVA workup however this is all coming back as unrevealing, strongly encourage follow-up with PCP.   Psychiatric medications may be contributing to episodes of weakness.  Maybe her UTI is contributing.  Likely home in a.m..    Time seen: 11PM 9/19/23  Damion Viera MD

## 2023-09-20 NOTE — CONSULTS
[facility] - 9th Floor Med Surg  Neurology  Consult Note    Patient Name: Selena Cunningham  MRN: [id number]  Admission Date: 9/19/2023  Hospital Length of Stay: 1 days  Code Status: Full Code   Attending Provider: Louie Hinojosa MD   Consulting Provider: Berna Abdi NP  Primary Care Physician: No, Primary Doctor  Principal Problem:<principal problem not specified>    Inpatient consult to Neurology  Consult performed by: Berna Abdi NP  Consult ordered by: Raza Acosta PA-C         Subjective:     Chief Complaint: off balance, frequent falls     HPI:   63 year old female with a past medical history of anxiety, depression, and ADD presented to ED on 9/19 for unsteady gait. She reported she has been having balance problems for the last year with frequent falls but has gotten significantly worse over the last 2 weeks. Denies dizziness. Reports she is not sure why she is falling, sometimes it just comes out of no where is she falls, other times she feels as though her coordination is impaired and she is not able to pick her foot up jesus enough to take a step. CT head was negative. MRA was negative for flow limiting stenosis or occlusion. MRI brain was negative. Neurology was consulted for stroke workup.              Past Medical History:   Diagnosis Date    ADHD     ADHD (attention deficit hyperactivity disorder)     Anxiety     Aphthous ulcer     Asthma     Constipation     Depression     Depression     Dysphagia     Epigastric pain     Fatigue     Fatigue     Folate deficiency     Generalized anxiety disorder     GERD (gastroesophageal reflux disease)     Hx of psychiatric care     Macrocytosis     Obesity, unspecified     Psychiatric problem        Past Surgical History:   Procedure Laterality Date    APPENDECTOMY      COLONOSCOPY N/A 5/11/2022    Procedure: COLONOSCOPY;  Surgeon: Damion Martinez MD;  Location: [facility] ENDOSCOPY;  Service: Gastroenterology;   Laterality: N/A;    HYSTERECTOMY      LAPAROSCOPIC CHOLECYSTECTOMY      NASAL SINUS SURGERY      TONSILLECTOMY         Review of patient's allergies indicates:   Allergen Reactions    Pcn [penicillins] Rash       Current Neurological Medications:     No current facility-administered medications on file prior to encounter.     Current Outpatient Medications on File Prior to Encounter   Medication Sig    buPROPion (WELLBUTRIN XL) 150 MG TB24 tablet Take 150 mg by mouth once daily.    clonazePAM (KLONOPIN) 1 MG tablet Take 1 mg by mouth 2 (two) times daily as needed for Anxiety.    clopidogreL (PLAVIX) 75 mg tablet Take 75 mg by mouth once daily.    diphenhydrAMINE (BENADRYL) 50 MG capsule Take 50 mg by mouth every 6 (six) hours as needed for Itching.    finasteride (PROSCAR) 5 mg tablet Take 5 mg by mouth once daily.    folic acid (FOLVITE) 1 MG tablet Take 1 mg by mouth once daily.    folic acid-vit B6-vit B12 2.5-25-2 mg (FOLBIC OR EQUIV) 2.5-25-2 mg Tab Take 1 tablet by mouth once daily.    liothyronine (CYTOMEL) 5 MCG Tab Take 2 tablets (10 mcg total) by mouth once daily.    methylphenidate HCl (RITALIN) 20 MG tablet Take 20 mg by mouth 2 (two) times daily.    minoxidiL (LONITEN) 2.5 MG tablet Take 2.5 mg by mouth once daily.    mometasone-formoterol (DULERA) 200-5 mcg/actuation inhaler Inhale 2 puffs into the lungs 2 (two) times daily. Controller    oxybutynin (DITROPAN-XL) 10 MG 24 hr tablet Take 10 mg by mouth once daily.    pantoprazole (PROTONIX) 40 MG tablet Take 40 mg by mouth once daily.    quetiapine (SEROQUEL) 200 MG Tab Take 1 tablet (200 mg total) by mouth every evening.    vilazodone (VIIBRYD) 20 mg Tab Take 20 mg by mouth Daily.    zolpidem (AMBIEN) 10 mg Tab Take 10 mg by mouth nightly as needed.     Family History       Problem Relation (Age of Onset)    Drug abuse Brother          Tobacco Use    Smoking status: Former    Smokeless tobacco: Never   Substance and Sexual Activity    Alcohol use: No     Drug use: No    Sexual activity: Not on file     Review of Systems   Neurological:  Negative for dizziness, tremors, seizures, syncope, facial asymmetry, speech difficulty, weakness, light-headedness, numbness and headaches.   Psychiatric/Behavioral:          Depression       Objective:     Vital Signs (Most Recent):  Temp: 98.8 °F (37.1 °C) (09/20/23 0737)  Pulse: 76 (09/20/23 0737)  Resp: 17 (09/20/23 0400)  BP: 127/78 (09/20/23 0737)  SpO2: 97 % (09/20/23 0737) Vital Signs (24h Range):  Temp:  [97.8 °F (36.6 °C)-98.8 °F (37.1 °C)] 98.8 °F (37.1 °C)  Pulse:  [67-84] 76  Resp:  [16-20] 17  SpO2:  [96 %-99 %] 97 %  BP: (122-154)/(72-99) 127/78     Weight: 88 kg (194 lb 0.1 oz)  Body mass index is 32.28 kg/m².     Physical Exam  Vitals and nursing note reviewed.   Constitutional:       General: She is not in acute distress.     Appearance: Normal appearance.   HENT:      Head: Normocephalic.   Eyes:      General: No visual field deficit.     Extraocular Movements:      Right eye: Normal extraocular motion and no nystagmus.      Left eye: Normal extraocular motion and no nystagmus.      Pupils: Pupils are equal, round, and reactive to light.   Cardiovascular:      Rate and Rhythm: Normal rate.   Pulmonary:      Effort: Pulmonary effort is normal.      Breath sounds: Normal breath sounds.   Musculoskeletal:         General: No swelling.   Skin:     General: Skin is warm and dry.   Neurological:      General: No focal deficit present.      Mental Status: She is alert and oriented to person, place, and time.      Cranial Nerves: No dysarthria or facial asymmetry.      Sensory: No sensory deficit.      Motor: Weakness (mild BLE weakness 4/5) present. No tremor, atrophy, abnormal muscle tone, seizure activity or pronator drift.      Coordination: Coordination normal. Finger-Nose-Finger Test and Heel to Shin Test normal.   Psychiatric:         Mood and Affect: Mood is depressed. Affect is tearful.         Speech: Speech  normal.         Behavior: Behavior normal.      Comments: Poor eye contact          NEUROLOGICAL EXAMINATION:     MENTAL STATUS   Oriented to person, place, and time.   Speech: speech is normal     CRANIAL NERVES     CN III, IV, VI   Pupils are equal, round, and reactive to light.    GAIT AND COORDINATION      Coordination   Finger to nose coordination: normal      Significant Labs:   Recent Lab Results  (Last 5 results in the past 24 hours)        09/20/23  0416   09/19/23  1901   09/19/23  1652   09/19/23  1525   09/19/23  1206        Influenza A, Molecular     Not Detected           Influenza B, Molecular     Not Detected           Phencyclidine       Negative         Albumin/Globulin Ratio 1.4         1.3       Albumin 3.0         3.5       Alkaline Phosphatase 71         85       ALT 17         12       Amphetamine Screen, Ur       Negative         Appearance, UA       Cloudy         AST 16         13       Bacteria, UA       Many         Barbiturate Screen, Ur       Negative         Baso # 0.03         0.03       Basophil % 0.8         0.6       Benzodiazepine Screen, Urine       Negative         BILIRUBIN TOTAL 0.3         0.2       Bilirubin, UA       Negative         BUN 10.1         16.9       Calcium 8.4         8.9       Cannabinoids, Urine       Negative         Chloride 110         109       Cholesterol 187               CO2 23         24       Cocaine (Metab.)       Negative         Color, UA       Yellow         Creatinine 0.87         0.94       CRP         4.50       eGFR >60         >60       Eos # 0.16         0.17       Eosinophil % 4.1         3.5       Estimated Avg Glucose 102.5               Fentanyl, Urine       Negative         Globulin, Total 2.2         2.6       Glucose 86         96       Glucose, UA       Negative         HDL 48               Hematocrit 38.7         42.0       Hemoglobin 12.7         13.9       Hemoglobin A1C External 5.2               Immature Grans (Abs) 0.01          0.04       Immature Granulocytes 0.3         0.8       INR         0.9       Ketones, UA       Negative         LDL Cholesterol External 122.00               Leukocytes, UA       3+         Lymph # 1.46         1.30       LYMPH % 37.1         27.0       MCH 31.5         31.7       MCHC 32.8         33.1       MCV 96.0         95.9       MDMA, Urine       Negative         Mono # 0.35         0.40       Mono % 8.9         8.3       MPV 10.0         9.7       Neut # 1.93         2.88       Neut % 48.8         59.8       NITRITE UA       Negative         nRBC 0.0         0.0       Occult Blood UA       Negative         Opiate Scrn, Ur       Negative         pH, UA       5.5         pH, Urine       5.5         Platelets 229         264       Potassium 4.1         4.5       PROTEIN TOTAL 5.2         6.1       Protein, UA       Negative         Protime         12.1       RBC 4.03         4.38       RBC, UA       0-2         RDW 13.0         12.9       SARS-CoV2 (COVID-19) Qualitative PCR     Not Detected           Sed Rate   20             Sodium 141         141       Specific Gravity,UA       1.008         Squam Epithel, UA       0-4         STREP A PCR ([facility])     Not Detected           Total Cholesterol/HDL Ratio 4               Triglycerides 83               Troponin I         <0.010       Urine Culture, Routine       >/= 100,000 colonies/ml Gram-negative Rods  [P]         Urobilinogen, UA       0.2         Very Low Density Lipoprotein 17               WBC, UA       50-99         WBC 3.94         4.82                               [P] - Preliminary Result               Significant Imaging: I have reviewed all pertinent imaging results/findings within the past 24 hours.    Assessment and Plan:     Falls  Frequent falls, off balance-r/o stroke      MRI brain:   1.  No acute intracranial findings identified.  2.  Chronic microangiopathic ischemia and atrophy.  CTA head and neck: No large vessel hemodynamically significant  stenosis or occlusion in the head or neck.  CT head: negative  LDL: 122  A1c: 5.2    -currently on aspirin 325 mg   -has had medication changes recently to her psych meds that could be contributing to falls, will discuss further differentials with MD during rounds         VTE Risk Mitigation (From admission, onward)           Ordered     Place sequential compression device  Until discontinued         09/19/23 2159     IP VTE LOW RISK PATIENT  Once         09/19/23 1951     Place sequential compression device  Until discontinued         09/19/23 1951                    Thank you for your consult.  Further recommendations to follow from MD Berna Abdi, NP  Neurology  [facility] - 9th Floor Med Surg    I have seen/examined the patient.  NP was scribe.  I agree with the findings unless outlined below:    Bird Graham MD  [facility]     Mri negative  Recc asa/statin  Recc she be referred to psychiatrist to adjust medications, which I believe may be contributing to falls    Signing off

## 2023-09-20 NOTE — SUBJECTIVE & OBJECTIVE
Past Medical History:   Diagnosis Date    ADHD     ADHD (attention deficit hyperactivity disorder)     Anxiety     Aphthous ulcer     Asthma     Constipation     Depression     Depression     Dysphagia     Epigastric pain     Fatigue     Fatigue     Folate deficiency     Generalized anxiety disorder     GERD (gastroesophageal reflux disease)     Hx of psychiatric care     Macrocytosis     Obesity, unspecified     Psychiatric problem        Past Surgical History:   Procedure Laterality Date    APPENDECTOMY      COLONOSCOPY N/A 5/11/2022    Procedure: COLONOSCOPY;  Surgeon: Damion Martinez MD;  Location: [facility] ENDOSCOPY;  Service: Gastroenterology;  Laterality: N/A;    HYSTERECTOMY      LAPAROSCOPIC CHOLECYSTECTOMY      NASAL SINUS SURGERY      TONSILLECTOMY         Review of patient's allergies indicates:   Allergen Reactions    Pcn [penicillins] Rash       Current Neurological Medications:     No current facility-administered medications on file prior to encounter.     Current Outpatient Medications on File Prior to Encounter   Medication Sig    buPROPion (WELLBUTRIN XL) 150 MG TB24 tablet Take 150 mg by mouth once daily.    clonazePAM (KLONOPIN) 1 MG tablet Take 1 mg by mouth 2 (two) times daily as needed for Anxiety.    clopidogreL (PLAVIX) 75 mg tablet Take 75 mg by mouth once daily.    diphenhydrAMINE (BENADRYL) 50 MG capsule Take 50 mg by mouth every 6 (six) hours as needed for Itching.    finasteride (PROSCAR) 5 mg tablet Take 5 mg by mouth once daily.    folic acid (FOLVITE) 1 MG tablet Take 1 mg by mouth once daily.    folic acid-vit B6-vit B12 2.5-25-2 mg (FOLBIC OR EQUIV) 2.5-25-2 mg Tab Take 1 tablet by mouth once daily.    liothyronine (CYTOMEL) 5 MCG Tab Take 2 tablets (10 mcg total) by mouth once daily.    methylphenidate HCl (RITALIN) 20 MG tablet Take 20 mg by mouth 2 (two) times daily.    minoxidiL (LONITEN) 2.5 MG tablet Take 2.5 mg by mouth once daily.    mometasone-formoterol  (DULERA) 200-5 mcg/actuation inhaler Inhale 2 puffs into the lungs 2 (two) times daily. Controller    oxybutynin (DITROPAN-XL) 10 MG 24 hr tablet Take 10 mg by mouth once daily.    pantoprazole (PROTONIX) 40 MG tablet Take 40 mg by mouth once daily.    quetiapine (SEROQUEL) 200 MG Tab Take 1 tablet (200 mg total) by mouth every evening.    vilazodone (VIIBRYD) 20 mg Tab Take 20 mg by mouth Daily.    zolpidem (AMBIEN) 10 mg Tab Take 10 mg by mouth nightly as needed.     Family History       Problem Relation (Age of Onset)    Drug abuse Brother          Tobacco Use    Smoking status: Former    Smokeless tobacco: Never   Substance and Sexual Activity    Alcohol use: No    Drug use: No    Sexual activity: Not on file     Review of Systems   Neurological:  Negative for dizziness, tremors, seizures, syncope, facial asymmetry, speech difficulty, weakness, light-headedness, numbness and headaches.   Psychiatric/Behavioral:          Depression       Objective:     Vital Signs (Most Recent):  Temp: 98.8 °F (37.1 °C) (09/20/23 0737)  Pulse: 76 (09/20/23 0737)  Resp: 17 (09/20/23 0400)  BP: 127/78 (09/20/23 0737)  SpO2: 97 % (09/20/23 0737) Vital Signs (24h Range):  Temp:  [97.8 °F (36.6 °C)-98.8 °F (37.1 °C)] 98.8 °F (37.1 °C)  Pulse:  [67-84] 76  Resp:  [16-20] 17  SpO2:  [96 %-99 %] 97 %  BP: (122-154)/(72-99) 127/78     Weight: 88 kg (194 lb 0.1 oz)  Body mass index is 32.28 kg/m².     Physical Exam  Vitals and nursing note reviewed.   Constitutional:       General: She is not in acute distress.     Appearance: Normal appearance.   HENT:      Head: Normocephalic.   Eyes:      General: No visual field deficit.     Extraocular Movements:      Right eye: Normal extraocular motion and no nystagmus.      Left eye: Normal extraocular motion and no nystagmus.      Pupils: Pupils are equal, round, and reactive to light.   Cardiovascular:      Rate and Rhythm: Normal rate.   Pulmonary:      Effort: Pulmonary effort is normal.       Breath sounds: Normal breath sounds.   Musculoskeletal:         General: No swelling.   Skin:     General: Skin is warm and dry.   Neurological:      General: No focal deficit present.      Mental Status: She is alert and oriented to person, place, and time.      Cranial Nerves: No dysarthria or facial asymmetry.      Sensory: No sensory deficit.      Motor: Weakness (mild BLE weakness 4/5) present. No tremor, atrophy, abnormal muscle tone, seizure activity or pronator drift.      Coordination: Coordination normal. Finger-Nose-Finger Test and Heel to Shin Test normal.   Psychiatric:         Mood and Affect: Mood is depressed. Affect is tearful.         Speech: Speech normal.         Behavior: Behavior normal.      Comments: Poor eye contact          NEUROLOGICAL EXAMINATION:     MENTAL STATUS   Oriented to person, place, and time.   Speech: speech is normal     CRANIAL NERVES     CN III, IV, VI   Pupils are equal, round, and reactive to light.    GAIT AND COORDINATION      Coordination   Finger to nose coordination: normal      Significant Labs:   Recent Lab Results  (Last 5 results in the past 24 hours)        09/20/23  0416   09/19/23  1901   09/19/23  1652   09/19/23  1525   09/19/23  1206        Influenza A, Molecular     Not Detected           Influenza B, Molecular     Not Detected           Phencyclidine       Negative         Albumin/Globulin Ratio 1.4         1.3       Albumin 3.0         3.5       Alkaline Phosphatase 71         85       ALT 17         12       Amphetamine Screen, Ur       Negative         Appearance, UA       Cloudy         AST 16         13       Bacteria, UA       Many         Barbiturate Screen, Ur       Negative         Baso # 0.03         0.03       Basophil % 0.8         0.6       Benzodiazepine Screen, Urine       Negative         BILIRUBIN TOTAL 0.3         0.2       Bilirubin, UA       Negative         BUN 10.1         16.9       Calcium 8.4         8.9       Cannabinoids, Urine        Negative         Chloride 110         109       Cholesterol 187               CO2 23         24       Cocaine (Metab.)       Negative         Color, UA       Yellow         Creatinine 0.87         0.94       CRP         4.50       eGFR >60         >60       Eos # 0.16         0.17       Eosinophil % 4.1         3.5       Estimated Avg Glucose 102.5               Fentanyl, Urine       Negative         Globulin, Total 2.2         2.6       Glucose 86         96       Glucose, UA       Negative         HDL 48               Hematocrit 38.7         42.0       Hemoglobin 12.7         13.9       Hemoglobin A1C External 5.2               Immature Grans (Abs) 0.01         0.04       Immature Granulocytes 0.3         0.8       INR         0.9       Ketones, UA       Negative         LDL Cholesterol External 122.00               Leukocytes, UA       3+         Lymph # 1.46         1.30       LYMPH % 37.1         27.0       MCH 31.5         31.7       MCHC 32.8         33.1       MCV 96.0         95.9       MDMA, Urine       Negative         Mono # 0.35         0.40       Mono % 8.9         8.3       MPV 10.0         9.7       Neut # 1.93         2.88       Neut % 48.8         59.8       NITRITE UA       Negative         nRBC 0.0         0.0       Occult Blood UA       Negative         Opiate Scrn, Ur       Negative         pH, UA       5.5         pH, Urine       5.5         Platelets 229         264       Potassium 4.1         4.5       PROTEIN TOTAL 5.2         6.1       Protein, UA       Negative         Protime         12.1       RBC 4.03         4.38       RBC, UA       0-2         RDW 13.0         12.9       SARS-CoV2 (COVID-19) Qualitative PCR     Not Detected           Sed Rate   20             Sodium 141         141       Specific Gravity,UA       1.008         Squam Epithel, UA       0-4         STREP A PCR ([facility])     Not Detected           Total Cholesterol/HDL Ratio 4               Triglycerides 83                Troponin I         <0.010       Urine Culture, Routine       >/= 100,000 colonies/ml Gram-negative Rods  [P]         Urobilinogen, UA       0.2         Very Low Density Lipoprotein 17               WBC, UA       50-99         WBC 3.94         4.82                               [P] - Preliminary Result               Significant Imaging: I have reviewed all pertinent imaging results/findings within the past 24 hours.

## 2023-09-20 NOTE — HPI
63 year old female with a past medical history of anxiety, depression, and ADD presented to ED on 9/19 for unsteady gait. She reported she has been having balance problems for the last year with frequent falls but has gotten significantly worse over the last 2 weeks. Denies dizziness. Reports she is not sure why she is falling, sometimes it just comes out of no where is she falls, other times she feels as though her coordination is impaired and she is not able to pick her foot up jesus enough to take a step. CT head was negative. MRA was negative for flow limiting stenosis or occlusion. MRI brain was negative. Neurology was consulted for stroke workup.

## 2023-09-20 NOTE — PT/OT/SLP EVAL
[facility]  Speech Language Pathology Department  Cognitive-Communication Evaluation    Patient Name:  Selena Cunningham   MRN:  [id number]    Recommendations:     General recommendations:  SLP intervention not indicated  Communication strategies:  none  Barriers to safe discharge: none    History:     Selena Cunningham is a/n 63 y.o. female admitted for unsteady gait. CT and MRI of the brain her negative.     Past Medical History:   Diagnosis Date    ADHD     ADHD (attention deficit hyperactivity disorder)     Anxiety     Aphthous ulcer     Asthma     Constipation     Depression     Depression     Dysphagia     Epigastric pain     Fatigue     Fatigue     Folate deficiency     Generalized anxiety disorder     GERD (gastroesophageal reflux disease)     Hx of psychiatric care     Macrocytosis     Obesity, unspecified     Psychiatric problem      Past Surgical History:   Procedure Laterality Date    APPENDECTOMY      COLONOSCOPY N/A 5/11/2022    Procedure: COLONOSCOPY;  Surgeon: Damion Martinez MD;  Location: [facility] ENDOSCOPY;  Service: Gastroenterology;  Laterality: N/A;    HYSTERECTOMY      LAPAROSCOPIC CHOLECYSTECTOMY      NASAL SINUS SURGERY      TONSILLECTOMY         Previous level of Function  Education:college  Occupation: unemployed, disabled  Lives:  with son  Handed: Right  Glasses: no  Hearing Aids: no  Home Responsibilities: financial management, medication/health management, shopping, and meal preparation    Imaging   Results for orders placed during the hospital encounter of 09/19/23    MRI Brain Without Contrast    Narrative  EXAMINATION:  MRI BRAIN WITHOUT CONTRAST    CLINICAL HISTORY:  cva;    TECHNIQUE:  Multiplanar MRI sequences were performed of the brain without contrast.    COMPARISON:  CT brain without contrast September 19, 2023.  MRI brain March 15, 2020    FINDINGS:  Chronic microvascular ischemic changes are mild with periventricular and few deep white  "matter T2 FLAIR hyperintense signals.  Generalized mild cerebral and cerebellar cortical volume loss.  Gradient echo sequences demonstrate no evidence of de phasing artifact to suggest hemorrhagic byproducts. No evidence of diffusion restriction or ADC map signal drop out to suggest acute infarct. The sella and suprasellar areas are unremarkable.    The cerebellar tonsils are normally positioned. There is no acute intracranial hemorrhage, hydrocephalus, midline shift or mass effect. No acute extra axial fluid collections identified. The mastoid air cells are clear.    Impression  1.  No acute intracranial findings identified.    2.  Chronic microangiopathic ischemia and atrophy.    .      Electronically signed by: Jose Luis Ruby  Date:    09/19/2023  Time:    20:03    Subjective     Patient awake, alert, and oriented x4 .  Patient goals: "to get better"   Spiritual/Cultural/Alevism Beliefs/Practices that affect care:    Pain/Comfort:  0/10    Objective:     ORAL MUSCULATURE  Dentition: own teeth  Facial Movement: WFL  Buccal Strength & Mobility: WFL  Mandibular Strength & Mobility: WFL    SPEECH PRODUCTION  Phoneme Production: adequate  Voice Quality: adequate  Voice Production: adequate  Speech Rate: appropriate  Loudness: acceptable  Respiration: WFL for speech  Speech Intelligibility  Known Context: Greater that 90%  Unknown Context: Greater that 90%    AUDITORY COMPREHENSION  Identification:  Body parts: 100%  Objects: 100%  Following Directions:  1-Step: 100%  2-Step: 100%  Yes/No Questions:  Biographical: 100%  Environmental: 100%  Simple: 100%  Complex: 100%    VERBAL EXPRESSION  Automatic Speech:  Days of the week: 100%  Months of the year: 100%  Repetition:  Phonemes: 100%  Single syllable words: 100%  Bi-syllabic words: 100%  Phrase Completion: 100%  Confrontation Naming  Body Parts: 100%  Objects: 100%  Wh- Questions:  Object name: 100%  Object function: 100%    COGNITION  Orientation:  Person: " yes  Place: yes  Time: yes  Situation: yes   Attention:  Focused: WNL  Sustained: WNL  Memory:  Immediate: WNL  Delayed: WNL  Problem Solving  Functional simple: WNL  Functional complex: WNL  Organization:  Convergent thinking: WNL  Divergent thinking: WNL  Sequencing: WNL  Executive Function:  Attention to detail: WNL  Awareness: WNL  Planning: WNL    Assessment:     Pt presents with functional speech and language skills, cognitive linguistic skills note to be at baseline. No skilled SLP intervention is warranted at this time.     Patient Education:     Patient provided with verbal education regarding POC.  Understanding was verbalized.    Time Tracking:     SLP Treatment Date:    9/20/23  Speech Start Time:   1045  Speech Stop Time:     1100  Speech Total Time (min):   15    Billable minutes:  Evaluation of Speech Sound Production with Comprehension and Expression, 15 minutes     09/20/2023

## 2023-09-20 NOTE — NURSING
Nurses Note -- 4 Eyes      9/20/2023   1:31 AM      Skin assessed during: Admit      [x] No Altered Skin Integrity Present    []Prevention Measures Documented      [] Yes- Altered Skin Integrity Present or Discovered   [] LDA Added if Not in Epic (Describe Wound)   [] New Altered Skin Integrity was Present on Admit and Documented in LDA   [] Wound Image Taken    Wound Care Consulted? No    Attending Nurse:  Johnny Smith RN/Staff Member:   Jessica Colón

## 2023-09-20 NOTE — ASSESSMENT & PLAN NOTE
Frequent falls, off balance-r/o stroke      MRI brain:   1.  No acute intracranial findings identified.  2.  Chronic microangiopathic ischemia and atrophy.  CTA head and neck: No large vessel hemodynamically significant stenosis or occlusion in the head or neck.  CT head: negative  LDL: 122  A1c: 5.2    -currently on aspirin 325 mg   -has had medication changes recently to her psych meds that could be contributing to falls, will discuss further differentials with MD during rounds

## 2023-09-21 ENCOUNTER — PATIENT OUTREACH (OUTPATIENT)
Dept: ADMINISTRATIVE | Facility: CLINIC | Age: 63
End: 2023-09-21
Payer: MEDICARE

## 2023-09-21 LAB — BACTERIA UR CULT: ABNORMAL

## 2023-09-21 NOTE — PROGRESS NOTES
C3 nurse spoke with Seelna Cunningham for a TCC post hospital discharge follow up call. The patient has a scheduled HOSFU appointment with Tennille Conrad MD on 9/22/23 @ 8:30.

## 2023-09-22 ENCOUNTER — OFFICE VISIT (OUTPATIENT)
Dept: FAMILY MEDICINE | Facility: CLINIC | Age: 63
End: 2023-09-22
Payer: MEDICARE

## 2023-09-22 VITALS
SYSTOLIC BLOOD PRESSURE: 100 MMHG | TEMPERATURE: 98 F | HEIGHT: 65 IN | HEART RATE: 84 BPM | BODY MASS INDEX: 32.43 KG/M2 | RESPIRATION RATE: 17 BRPM | WEIGHT: 194.63 LBS | DIASTOLIC BLOOD PRESSURE: 64 MMHG | OXYGEN SATURATION: 97 %

## 2023-09-22 DIAGNOSIS — M79.605 PAIN IN BOTH LOWER EXTREMITIES: ICD-10-CM

## 2023-09-22 DIAGNOSIS — N95.1 VAGINAL DRYNESS, MENOPAUSAL: ICD-10-CM

## 2023-09-22 DIAGNOSIS — Z23 NEED FOR SHINGLES VACCINE: ICD-10-CM

## 2023-09-22 DIAGNOSIS — E03.9 HYPOTHYROIDISM, UNSPECIFIED TYPE: ICD-10-CM

## 2023-09-22 DIAGNOSIS — Z23 NEED FOR TETANUS, DIPHTHERIA, AND ACELLULAR PERTUSSIS (TDAP) VACCINE: ICD-10-CM

## 2023-09-22 DIAGNOSIS — Z76.89 ENCOUNTER FOR SUPPORT AND COORDINATION OF TRANSITION OF CARE: ICD-10-CM

## 2023-09-22 DIAGNOSIS — Z23 NEEDS FLU SHOT: ICD-10-CM

## 2023-09-22 DIAGNOSIS — F33.2 SEVERE EPISODE OF RECURRENT MAJOR DEPRESSIVE DISORDER, WITHOUT PSYCHOTIC FEATURES: ICD-10-CM

## 2023-09-22 DIAGNOSIS — Z23 NEED FOR VACCINATION FOR STREP PNEUMONIAE: ICD-10-CM

## 2023-09-22 DIAGNOSIS — Z82.49 FAMILY HISTORY OF CARDIAC DISORDER: ICD-10-CM

## 2023-09-22 DIAGNOSIS — Z78.0 MENOPAUSE: ICD-10-CM

## 2023-09-22 DIAGNOSIS — Z76.89 ENCOUNTER TO ESTABLISH CARE: ICD-10-CM

## 2023-09-22 DIAGNOSIS — F41.1 GAD (GENERALIZED ANXIETY DISORDER): ICD-10-CM

## 2023-09-22 DIAGNOSIS — Z12.31 BREAST CANCER SCREENING BY MAMMOGRAM: ICD-10-CM

## 2023-09-22 DIAGNOSIS — M79.604 PAIN IN BOTH LOWER EXTREMITIES: ICD-10-CM

## 2023-09-22 PROCEDURE — 3008F PR BODY MASS INDEX (BMI) DOCUMENTED: ICD-10-PCS | Mod: CPTII,,, | Performed by: STUDENT IN AN ORGANIZED HEALTH CARE EDUCATION/TRAINING PROGRAM

## 2023-09-22 PROCEDURE — 3078F DIAST BP <80 MM HG: CPT | Mod: CPTII,,, | Performed by: STUDENT IN AN ORGANIZED HEALTH CARE EDUCATION/TRAINING PROGRAM

## 2023-09-22 PROCEDURE — 90662 FLU VACCINE - QUADRIVALENT - HIGH DOSE (65+) PRESERVATIVE FREE IM: ICD-10-PCS | Mod: ,,, | Performed by: STUDENT IN AN ORGANIZED HEALTH CARE EDUCATION/TRAINING PROGRAM

## 2023-09-22 PROCEDURE — 1111F PR DISCHARGE MEDS RECONCILED W/ CURRENT OUTPATIENT MED LIST: ICD-10-PCS | Mod: CPTII,,, | Performed by: STUDENT IN AN ORGANIZED HEALTH CARE EDUCATION/TRAINING PROGRAM

## 2023-09-22 PROCEDURE — 3288F PR FALLS RISK ASSESSMENT DOCUMENTED: ICD-10-PCS | Mod: CPTII,,, | Performed by: STUDENT IN AN ORGANIZED HEALTH CARE EDUCATION/TRAINING PROGRAM

## 2023-09-22 PROCEDURE — G0008 ADMIN INFLUENZA VIRUS VAC: HCPCS | Mod: ,,, | Performed by: STUDENT IN AN ORGANIZED HEALTH CARE EDUCATION/TRAINING PROGRAM

## 2023-09-22 PROCEDURE — 3078F PR MOST RECENT DIASTOLIC BLOOD PRESSURE < 80 MM HG: ICD-10-PCS | Mod: CPTII,,, | Performed by: STUDENT IN AN ORGANIZED HEALTH CARE EDUCATION/TRAINING PROGRAM

## 2023-09-22 PROCEDURE — 3074F SYST BP LT 130 MM HG: CPT | Mod: CPTII,,, | Performed by: STUDENT IN AN ORGANIZED HEALTH CARE EDUCATION/TRAINING PROGRAM

## 2023-09-22 PROCEDURE — 1159F MED LIST DOCD IN RCRD: CPT | Mod: CPTII,,, | Performed by: STUDENT IN AN ORGANIZED HEALTH CARE EDUCATION/TRAINING PROGRAM

## 2023-09-22 PROCEDURE — 1100F PR PT FALLS ASSESS DOC 2+ FALLS/FALL W/INJURY/YR: ICD-10-PCS | Mod: CPTII,,, | Performed by: STUDENT IN AN ORGANIZED HEALTH CARE EDUCATION/TRAINING PROGRAM

## 2023-09-22 PROCEDURE — G0008 FLU VACCINE - QUADRIVALENT - HIGH DOSE (65+) PRESERVATIVE FREE IM: ICD-10-PCS | Mod: ,,, | Performed by: STUDENT IN AN ORGANIZED HEALTH CARE EDUCATION/TRAINING PROGRAM

## 2023-09-22 PROCEDURE — 3008F BODY MASS INDEX DOCD: CPT | Mod: CPTII,,, | Performed by: STUDENT IN AN ORGANIZED HEALTH CARE EDUCATION/TRAINING PROGRAM

## 2023-09-22 PROCEDURE — 90662 IIV NO PRSV INCREASED AG IM: CPT | Mod: ,,, | Performed by: STUDENT IN AN ORGANIZED HEALTH CARE EDUCATION/TRAINING PROGRAM

## 2023-09-22 PROCEDURE — 1159F PR MEDICATION LIST DOCUMENTED IN MEDICAL RECORD: ICD-10-PCS | Mod: CPTII,,, | Performed by: STUDENT IN AN ORGANIZED HEALTH CARE EDUCATION/TRAINING PROGRAM

## 2023-09-22 PROCEDURE — 3044F PR MOST RECENT HEMOGLOBIN A1C LEVEL <7.0%: ICD-10-PCS | Mod: CPTII,,, | Performed by: STUDENT IN AN ORGANIZED HEALTH CARE EDUCATION/TRAINING PROGRAM

## 2023-09-22 PROCEDURE — 1111F DSCHRG MED/CURRENT MED MERGE: CPT | Mod: CPTII,,, | Performed by: STUDENT IN AN ORGANIZED HEALTH CARE EDUCATION/TRAINING PROGRAM

## 2023-09-22 PROCEDURE — 1100F PTFALLS ASSESS-DOCD GE2>/YR: CPT | Mod: CPTII,,, | Performed by: STUDENT IN AN ORGANIZED HEALTH CARE EDUCATION/TRAINING PROGRAM

## 2023-09-22 PROCEDURE — 3288F FALL RISK ASSESSMENT DOCD: CPT | Mod: CPTII,,, | Performed by: STUDENT IN AN ORGANIZED HEALTH CARE EDUCATION/TRAINING PROGRAM

## 2023-09-22 PROCEDURE — 99204 OFFICE O/P NEW MOD 45 MIN: CPT | Mod: ,,, | Performed by: STUDENT IN AN ORGANIZED HEALTH CARE EDUCATION/TRAINING PROGRAM

## 2023-09-22 PROCEDURE — 3074F PR MOST RECENT SYSTOLIC BLOOD PRESSURE < 130 MM HG: ICD-10-PCS | Mod: CPTII,,, | Performed by: STUDENT IN AN ORGANIZED HEALTH CARE EDUCATION/TRAINING PROGRAM

## 2023-09-22 PROCEDURE — 3044F HG A1C LEVEL LT 7.0%: CPT | Mod: CPTII,,, | Performed by: STUDENT IN AN ORGANIZED HEALTH CARE EDUCATION/TRAINING PROGRAM

## 2023-09-22 PROCEDURE — 99204 PR OFFICE/OUTPT VISIT, NEW, LEVL IV, 45-59 MIN: ICD-10-PCS | Mod: ,,, | Performed by: STUDENT IN AN ORGANIZED HEALTH CARE EDUCATION/TRAINING PROGRAM

## 2023-09-22 RX ORDER — ALBUTEROL 2 MG/1
2 TABLET ORAL 3 TIMES DAILY
COMMUNITY
End: 2023-11-29 | Stop reason: SDUPTHER

## 2023-09-22 RX ORDER — KETOCONAZOLE 20 MG/ML
120 SHAMPOO, SUSPENSION TOPICAL
COMMUNITY
Start: 2023-08-27

## 2023-09-22 RX ORDER — ZOSTER VACCINE RECOMBINANT, ADJUVANTED 50 MCG/0.5
0.5 KIT INTRAMUSCULAR ONCE
Qty: 1 EACH | Refills: 1 | Status: SHIPPED | OUTPATIENT
Start: 2023-09-22 | End: 2023-09-22

## 2023-09-22 RX ORDER — ESTRADIOL 0.1 MG/G
1 CREAM VAGINAL
COMMUNITY
Start: 2023-08-28

## 2023-09-22 RX ORDER — MELOXICAM 15 MG/1
15 TABLET ORAL DAILY PRN
COMMUNITY
Start: 2021-12-22 | End: 2023-11-28 | Stop reason: SDUPTHER

## 2023-09-22 RX ORDER — AMOXICILLIN 500 MG
1 CAPSULE ORAL DAILY
COMMUNITY

## 2023-09-22 NOTE — PROGRESS NOTES
"   Patient ID: [id number]     Chief Complaint: Establish Care (Hospital encounter (Admitted: 2023; Discharged: 2023))      HPI:      Disclaimer:  This note is prepared using voice recognition software and as such is likely to have errors despite attempts at proofreading. Please contact me for questions.     Selena Cunningham is a 63 y.o. female here today to establish care.    Acute Issues-  Hospital visit follow up   Patient reports that she had intermittent leg weakness for which she visited hospital.  She was admitted to the hospitals for  and had a workup negative for stroke.  She was seen inpatient Neurology by Dr. Graham.  As of today she feels good.  Request    Depression   Generalized anxiety disorder   Currently seeing Dr. Marie but would like to change his psychiatrist and needs mental health counseling for depression.    Denies suicidal/homicidal symptoms   Currently on Wellbutrin and Trintellix per her psychiatrist    Vaginal dryness/vaginal tear   Patient reports that she is been having vaginal dryness which is leading to her "vaginal tear"  She would like to see a gynecologist.  Status post hysterectomy    Family history of sudden cardiac death  Father  at age 48   Reports of having her brothers , sisters and are not having coronary artery disease at earlier age  Seeing a cardiologist but would like to switch to a different cardiologist    Hypothyroidism   Patient reports that she is off of her medication and is asymptomatic  Needs a new TSH and T4        Chronic Issues-    ----------------------------  ADHD  ADHD (attention deficit hyperactivity disorder)  Anxiety  Aphthous ulcer  Asthma  Constipation  Depression  Depression  Dysphagia  Epigastric pain  Fatigue  Fatigue  Folate deficiency  Generalized anxiety disorder  GERD (gastroesophageal reflux disease)  Hx of psychiatric care  Macrocytosis  Obesity, unspecified  Psychiatric problem     Past Surgical History:   Procedure " Laterality Date    APPENDECTOMY      COLONOSCOPY N/A 5/11/2022    Procedure: COLONOSCOPY;  Surgeon: Damion Martinez MD;  Location: [facility] ENDOSCOPY;  Service: Gastroenterology;  Laterality: N/A;    HYSTERECTOMY      LAPAROSCOPIC CHOLECYSTECTOMY      NASAL SINUS SURGERY      TONSILLECTOMY         Review of patient's allergies indicates:   Allergen Reactions    Pcn [penicillins] Rash       Current Outpatient Medications   Medication Instructions    buPROPion (WELLBUTRIN XL) 150 mg, Oral, Daily    cefdinir (OMNICEF) 300 mg, Oral, 2 times daily    clonazePAM (KLONOPIN) 1 mg, Oral, 2 times daily PRN    clopidogreL (PLAVIX) 75 mg, Oral, Daily    diphenhydrAMINE (BENADRYL) 50 mg, Oral, Every 6 hours PRN    finasteride (PROSCAR) 5 mg, Oral, Daily    folic acid (FOLVITE) 1 mg, Oral, Daily    folic acid-vit B6-vit B12 2.5-25-2 mg (FOLBIC OR EQUIV) 2.5-25-2 mg Tab 1 tablet, Oral, Daily    liothyronine (CYTOMEL) 10 mcg, Oral, Daily    methylphenidate HCl (RITALIN) 20 mg, Oral, 2 times daily    minoxidiL (LONITEN) 2.5 mg, Oral, Daily    mometasone-formoterol (DULERA) 200-5 mcg/actuation inhaler 2 puffs, Inhalation, 2 times daily, Controller    oxybutynin (DITROPAN-XL) 10 mg, Oral, Daily    pantoprazole (PROTONIX) 40 mg, Oral, Daily    QUEtiapine (SEROQUEL) 200 mg, Oral, Nightly    vilazodone (VIIBRYD) 20 mg, Oral, Daily    zolpidem (AMBIEN) 10 mg, Oral, Nightly PRN       Social History     Socioeconomic History    Marital status:     Number of children: 2   Tobacco Use    Smoking status: Former    Smokeless tobacco: Never   Substance and Sexual Activity    Alcohol use: No    Drug use: No        Family History   Problem Relation Age of Onset    Drug abuse Brother         Patient Care Team:  No, Primary Doctor as PCP - General     Subjective:     ROS    See HPI for details    Constitutional: Denies Change in appetite. Denies Chills. Denies Fever. Denies Night sweats.  Respiratory: Denies Cough. Denies Shortness  "of breath. Denies Shortness of breath with exertion. Denies Wheezing.  Cardiovascular: DeniesChest pain at rest. Denies Chest pain with exertion. Denies Irregular heartbeat. Denies Palpitations. Denies Edema.  Gastrointestinal: Denies Abdominal pain. DeniesDiarrhea. Denies Nausea. Denies Vomiting. Denies Hematemesis or Hematochezia.  Genitourinary: Denies Dysuria. Denies Urinary frequency. Denies Urinary urgency. Denies Blood in urine.  Endocrine: Denies Cold intolerance. Denies Excessive thirst. Denies Heat intolerance. Denies Weight loss. Denies Weight gain.  Musculoskeletal: Denies Painful joints. Denies Weakness.  Integumentary: Denies Rash. Denies Itching. Denies Dry skin.  Neurologic: Denies Dizziness. Denies Fainting. Denies Headache.  Psychiatric: Denies Depression. Denies Anxiety. Denies Suicidal/Homicidal ideations.    All Other ROS: Negative except as stated in HPI.  Objective:     Visit Vitals  /64 (BP Location: Right arm, Patient Position: Sitting, BP Method: Large (Manual))   Pulse 84   Temp 98.2 °F (36.8 °C) (Oral)   Resp 17   Ht 5' 5" (1.651 m)   Wt 88.3 kg (194 lb 9.6 oz)   SpO2 97%   BMI 32.38 kg/m²       Physical Exam    General: Alert and oriented, No acute distress.  Respiratory: Clear to auscultation bilaterally; No wheezes, rales or rhonchi,Non-labored respirations, Symmetrical chest wall expansion.  Cardiovascular: Regular rate and rhythm, S1/S2 normal, No murmurs, rubs or gallops.  Gastrointestinal: Soft, Non-tender, Non-distended, Normal bowel sounds, No palpable organomegaly.  Musculoskeletal: Normal range of motion.  Extremities: No clubbing, cyanosis or edema  Neurologic: No focal deficits  Psychiatric: Normal interaction, Coherent speech, Euthymic mood, Appropriate affect   Assessment:       ICD-10-CM ICD-9-CM   1. Encounter to establish care  Z76.89 V65.8   2. Encounter for support and coordination of transition of care  Z76.89 V65.8   3. Pain in both lower extremities  M79.604 " 729.5    M79.605    4. Severe episode of recurrent major depressive disorder, without psychotic features  F33.2 296.33   5. YOVANY (generalized anxiety disorder)  F41.1 300.02   6. Vaginal dryness, menopausal  N95.1 627.2   7. Hypothyroidism, unspecified type  E03.9 244.9   8. Family history of cardiac disorder  Z82.49 V17.49   9. Breast cancer screening by mammogram  Z12.31 V76.12   10. Need for vaccination for Strep pneumoniae  Z23 V03.82   11. Need for shingles vaccine  Z23 V04.89   12. Needs flu shot  Z23 V04.81   13. Menopause  Z78.0 627.2   14. Need for tetanus, diphtheria, and acellular pertussis (Tdap) vaccine  Z23 V06.1        Plan:     1. Encounter to establish care  Discuss about the blood work/screening test/immunizations   Recommend to start dash diet along with 35 minutes of brisk walking    2. Encounter for support and coordination of transition of care  3. Pain in both lower extremities  Discussed in length about stroke precautions and follow up   Physical therapy sent  Continue aspirin and Plavix  -     Ambulatory referral/consult to Physical/Occupational Therapy; Future; Expected date: 09/29/2023    4. Severe episode of recurrent major depressive disorder, without psychotic features  5. YOVANY (generalized anxiety disorder)  Recommend to keep scheduled follow-up with current psychiatrist   ER precautions provided   Continue mental health exercises and Rx as prescribed   Referral to new psychiatrist and mental health counseling  -     Ambulatory referral/consult to Psychiatry; Future; Expected date: 09/29/2023  -     Ambulatory referral/consult to Behavioral Health; Future; Expected date: 09/29/2023  -     Ambulatory referral/consult to Psychiatry; Future; Expected date: 09/29/2023    6. Vaginal dryness, menopausal  -     Ambulatory referral/consult to Obstetrics / Gynecology; Future; Expected date: 09/29/2023  Can apply lubricant  Topical estrogen offered.  Patient may consider in future     7.  Hypothyroidism, unspecified type  -     TSH; Future; Expected date: 09/22/2023  -     T4, Free; Future; Expected date: 09/22/2023    8. Family history of cardiac disorder  Recommend to keep goal blood pressure less than 130/80, goal LDL less than 70, goal A1c less than 7  -     Ambulatory referral/consult to Cardiology; Future; Expected date: 09/29/2023    9. Breast cancer screening by mammogram  -     Mammo Digital Screening Bilat w/ Timbo; Future; Expected date: 09/22/2023    10. Need for vaccination for Strep pneumoniae  -     pneumoc 20-pasquale conj-dip cr,PF, (PREVNAR-20, PF,) 0.5 mL Syrg injection; Inject 0.5 mLs into the muscle once. Please administer vaccine. for 1 dose  Dispense: 0.5 mL; Refill: 0    11. Need for shingles vaccine  -     varicella-zoster gE-AS01B, PF, (SHINGRIX, PF,) 50 mcg/0.5 mL injection; Inject 0.5 mLs into the muscle once. Please administer shingrix vaccine now and repeat dose in 2-6 months. for 1 dose  Dispense: 1 each; Refill: 1    12. Needs flu shot  -     Influenza - Quadrivalent - High Dose (65+) (PF) (IM)    13. Menopause  -     DXA Bone Density Axial Skeleton 1 or more sites; Future; Expected date: 09/22/2023    14. Need for tetanus, diphtheria, and acellular pertussis (Tdap) vaccine  Rx sent       Medication List with Changes/Refills   Current Medications    BUPROPION (WELLBUTRIN XL) 150 MG TB24 TABLET    Take 150 mg by mouth once daily.       Start Date: --        End Date: --    CEFDINIR (OMNICEF) 300 MG CAPSULE    Take 1 capsule (300 mg total) by mouth 2 (two) times daily. for 3 days       Start Date: 9/20/2023 End Date: 9/23/2023    CLONAZEPAM (KLONOPIN) 1 MG TABLET    Take 1 mg by mouth 2 (two) times daily as needed for Anxiety.       Start Date: --        End Date: --    CLOPIDOGREL (PLAVIX) 75 MG TABLET    Take 75 mg by mouth once daily.       Start Date: --        End Date: --    DIPHENHYDRAMINE (BENADRYL) 50 MG CAPSULE    Take 50 mg by mouth every 6 (six) hours as needed for  Itching.       Start Date: --        End Date: --    FINASTERIDE (PROSCAR) 5 MG TABLET    Take 5 mg by mouth once daily.       Start Date: --        End Date: --    FOLIC ACID (FOLVITE) 1 MG TABLET    Take 1 mg by mouth once daily.       Start Date: --        End Date: --    FOLIC ACID-VIT B6-VIT B12 2.5-25-2 MG (FOLBIC OR EQUIV) 2.5-25-2 MG TAB    Take 1 tablet by mouth once daily.       Start Date: 12/8/2016 End Date: --    LIOTHYRONINE (CYTOMEL) 5 MCG TAB    Take 2 tablets (10 mcg total) by mouth once daily.       Start Date: 12/8/2016 End Date: 9/21/2023    METHYLPHENIDATE HCL (RITALIN) 20 MG TABLET    Take 20 mg by mouth 2 (two) times daily.       Start Date: --        End Date: --    MINOXIDIL (LONITEN) 2.5 MG TABLET    Take 2.5 mg by mouth once daily.       Start Date: --        End Date: --    MOMETASONE-FORMOTEROL (DULERA) 200-5 MCG/ACTUATION INHALER    Inhale 2 puffs into the lungs 2 (two) times daily. Controller       Start Date: --        End Date: --    OXYBUTYNIN (DITROPAN-XL) 10 MG 24 HR TABLET    Take 10 mg by mouth once daily.       Start Date: --        End Date: --    PANTOPRAZOLE (PROTONIX) 40 MG TABLET    Take 40 mg by mouth once daily.       Start Date: --        End Date: --    QUETIAPINE (SEROQUEL) 200 MG TAB    Take 1 tablet (200 mg total) by mouth every evening.       Start Date: 12/8/2016 End Date: 9/21/2023    VILAZODONE (VIIBRYD) 20 MG TAB    Take 20 mg by mouth Daily.       Start Date: --        End Date: --    ZOLPIDEM (AMBIEN) 10 MG TAB    Take 10 mg by mouth nightly as needed.       Start Date: --        End Date: --          Follow up in about 4 weeks (around 10/20/2023) for Annual welness. .   In addition to their scheduled follow up, the patient has also been instructed to follow up on as needed basis.

## 2023-09-26 ENCOUNTER — TELEPHONE (OUTPATIENT)
Dept: FAMILY MEDICINE | Facility: CLINIC | Age: 63
End: 2023-09-26
Payer: MEDICARE

## 2023-09-26 NOTE — TELEPHONE ENCOUNTER
----- Message from Jose Angel Too sent at 9/26/2023  1:05 PM CDT -----  .Type:  Needs Medical Advice    Who Called: [facility]  Symptoms (please be specific):    How long has patient had these symptoms:    Pharmacy name and phone #:    Would the patient rather a call back or a response via [facility]? Call back  Best Call Back Number: [phone]  Additional Information: calling to inform clinic hat referral for pt was denied due to only seeing residents pts

## 2023-09-27 ENCOUNTER — TELEPHONE (OUTPATIENT)
Dept: FAMILY MEDICINE | Facility: CLINIC | Age: 63
End: 2023-09-27
Payer: MEDICARE

## 2023-09-27 NOTE — TELEPHONE ENCOUNTER
----- Message from Ariel Armstrong sent at 9/27/2023 12:03 PM CDT -----  .Type:  Needs Medical Advice    Who Called: Selena  Symptoms (please be specific):    How long has patient had these symptoms:    Pharmacy name and phone #:    Would the patient rather a call back or a response via [facility]?   Best Call Back Number: [phone]  Additional Information: Patient requested call back from nurse re a referral. She saw the doctor last week.

## 2023-09-27 NOTE — TELEPHONE ENCOUNTER
Pt is asking for a Referral ENT: Pt is having trouble with her ears. Pt was in the hospital. Says she has fluid in her ears.

## 2023-09-27 NOTE — TELEPHONE ENCOUNTER
Yes ma'am! I let her know and she voiced to me she was just here on the 22nd for a bunch of referrals and forgot about this one.

## 2023-09-28 ENCOUNTER — TELEPHONE (OUTPATIENT)
Dept: FAMILY MEDICINE | Facility: CLINIC | Age: 63
End: 2023-09-28
Payer: MEDICARE

## 2023-09-28 RX ORDER — DEXAMETHASONE 0.5 MG/5ML
0.5 ELIXIR ORAL EVERY 8 HOURS
COMMUNITY
End: 2023-09-29

## 2023-09-28 RX ORDER — DEXAMETHASONE 0.5 MG/5ML
0.5 ELIXIR ORAL EVERY 8 HOURS
Qty: 5 ML | Refills: 0 | OUTPATIENT
Start: 2023-09-28

## 2023-09-28 NOTE — TELEPHONE ENCOUNTER
----- Message from Ariel Armstrong sent at 9/28/2023  9:49 AM CDT -----  .Type:  Needs Medical Advice    Who Called: Selena  Symptoms (please be specific):    How long has patient had these symptoms:    Pharmacy name and phone #:    Would the patient rather a call back or a response via [facility]?   Best Call Back Number: [phone]  Additional Information: Patient was returning a call she missed. She did not know who called her.

## 2023-09-28 NOTE — TELEPHONE ENCOUNTER
Called patient in regards to referral update from [facility].    Referral there was denied due to them not accepting new medicare pts at the moment. Called patient to let her know and gave her usual protocol to follow when they are denied (call insurance, see who they cover under speciality, give us a call back once they have chosen). Pt voiced understanding and said she will call her insurance later. I voiced my understanding.

## 2023-09-28 NOTE — TELEPHONE ENCOUNTER
Returned patient's call. Pt stated she needed a refill on requested medication. Other provider that prescribed it to her has retired.     Please advise if necessary. Thanks!

## 2023-09-29 ENCOUNTER — TELEPHONE (OUTPATIENT)
Dept: FAMILY MEDICINE | Facility: CLINIC | Age: 63
End: 2023-09-29
Payer: MEDICARE

## 2023-09-29 DIAGNOSIS — J06.9 UPPER RESPIRATORY TRACT INFECTION, UNSPECIFIED TYPE: Primary | ICD-10-CM

## 2023-09-29 RX ORDER — PREDNISONE 20 MG/1
40 TABLET ORAL DAILY
Qty: 10 TABLET | Refills: 0 | Status: SHIPPED | OUTPATIENT
Start: 2023-09-29 | End: 2023-10-04

## 2023-09-29 NOTE — TELEPHONE ENCOUNTER
Pt called again asking for a refill of DexAMETHasone. She voiced shes having a bad reaction in her lip and needs it asap. She is calling her pharmacy to end a refill request to us. She was just here for an appointment 09/22/23 and she cant afford another appointment or trip to come here. Please advise, thanks!   Maybe we can virtual or Evisit?

## 2023-09-29 NOTE — TELEPHONE ENCOUNTER
----- Message from Jose Angel Gage sent at 9/29/2023 10:23 AM CDT -----  .Type:  Needs Medical Advice    Who Called: pt  Symptoms (please be specific):    How long has patient had these symptoms:    Pharmacy name and phone #:    Would the patient rather a call back or a response via [facility]? Call back  Best Call Back Number: [phone]  Additional Information: pt calling about previous message attempted back line no answer

## 2023-10-02 ENCOUNTER — TELEPHONE (OUTPATIENT)
Dept: FAMILY MEDICINE | Facility: CLINIC | Age: 63
End: 2023-10-02
Payer: MEDICARE

## 2023-10-02 NOTE — TELEPHONE ENCOUNTER
Called the patient to let her know of PCP's medication recommendations for now. Pt said she did not get the chance to pick them up yet, but she will today and try them. I voiced my understanding and told her if the prednisone was still not working, to give us a call back. Pt voiced her understanding.

## 2023-10-02 NOTE — TELEPHONE ENCOUNTER
While calling the patient in regards to the prednisone that was sent to her pharmacy for her concerns, pt reported that she still needed a referral sent to the ENT, as she said was discussed with PCP during last visit.    If we can, please put in ENT referral. If not and pt will need an appt, let us know and we will confirm. I did already tell the patient that chances are she would need to be seen in regards to such.

## 2023-10-03 NOTE — TELEPHONE ENCOUNTER
Called pt but son answer. I went ahead and scheduled her for an ENT Referral for 10/06/23 @ 10:45. I will call her back in 30 minutes.

## 2023-10-04 NOTE — DISCHARGE SUMMARY
[facility] Hospital Medicine Discharge Summary    Admit Date: 9/19/2023  Discharge Date and Time: 9/20/2023 3:04 PM  Admitting Physician:  Team  Discharging Physician: Louie Hinojosa MD.  Primary Care Physician: Tennille Conrad MD  Consults: Neurology    Discharge Diagnoses:  Anxiety disorder   Peripheral arterial disease  Essential hypertension   Hypothyroidism     Hospital Course:   Patient is a 63-year-old female whose history includes anxiety, depression and ADD.  Presents to the emergency room with complaints of intermittent episodes of unsteady gait and loss of balance.  Symptoms have been ongoing for the last approximate 1 year but have become more frequent over the last 2 weeks.  She denies symptoms of dizziness or room spinning.  Describes that her limbs become uncoordinated and it feels as though she is not picking up her foot high enough to take a step.  Denies any chest pain, shortness of breath, palpitations or weakness of extremities.  Also reports a 1 year history of episodic throat discomfort where it feels as though her food gets stuck.  She did undergo an EGD approximately but she can not recall the physician who performed the procedure nor the facility where it was done.  Reports she had some blisters repaired.     Vital signs stable.  Vital signs unremarkable.  Swab for COVID and influenza A/B negative.  UA shows evidence of a UTI but patient denies any symptoms of dysuria, frequency or difficulty urinating.  CT head without contrast negative for acute intracranial findings.  MRA head and neck shows no large vessel hemodynamically significant stenosis or occlusion.  MRI brain without shows chronic microangiopathic ischemia and atrophy with no acute intracranial findings.    Patient was evaluated by neurology who recommended aspirin and statin and that patient be referred to a psychiatrist to titrate her antipsychotic medications which they believed may  "be contributing to the patient's frequent falls.    Patient was seen and examined on the day of discharge.      Vitals:  VITAL SIGNS: 24 HRS MIN & MAX LAST   No data recorded 99.1 °F (37.3 °C)   No data recorded (!) 149/81   No data recorded  73   No data recorded 17   No data recorded 98 %       Physical Exam:  General: in no acute distress  HENT: normocephalic, atraumatic  Eye: PERRL, EOMI, clear conjunctiva  Neck: full ROM, no thyromegaly, no JVD  Respiratory: clear to auscultation bilaterally  Cardiovascular: regular rate and rhythm  Gastrointestinal: non-distended, positive bowel sounds, non-tender  Musculoskeletal: no gross deformity  Integumentary: warm, dry, intact, no rashes  Neurological: cranial nerves grossly intact, no focal neurological deficit  Psychiatric: cooperative, flat affect, anxious and depressed appearing      Procedures Performed: No admission procedures for hospital encounter.     Significant Diagnostic Studies: See Full reports for all details    No results for input(s): "WBC", "RBC", "HGB", "HCT", "MCV", "MCH", "MCHC", "RDW", "PLT", "MPV", "GRAN", "LYMPH", "MONO", "BASO", "NRBC" in the last 168 hours.    No results for input(s): "NA", "K", "CL", "CO2", "ANIONGAP", "BUN", "CREATININE", "GLU", "CALCIUM", "PH", "MG", "ALBUMIN", "PROT", "ALKPHOS", "ALT", "AST", "BILITOT" in the last 168 hours.     Microbiology Results (last 7 days)       ** No results found for the last 168 hours. **             CV Ultrasound Bilateral Doppler Carotid  Bilateral Internal Carotid arteries show no significant stenosis.  Bilateral Vertebral arteries show antegrade flow.  Echo    Left Ventricle: There is low normal systolic function with a visually   estimated ejection fraction of 50 - 55%. Grade I diastolic dysfunction.    Right Ventricle: Normal right ventricular cavity size.    Neg intra cardiac shunt         Medication List        CONTINUE taking these medications      buPROPion 150 MG TB24 tablet  Commonly " known as: WELLBUTRIN XL     clonazePAM 1 MG tablet  Commonly known as: KlonoPIN     clopidogreL 75 mg tablet  Commonly known as: PLAVIX     diphenhydrAMINE 50 MG capsule  Commonly known as: BENADRYL     DULERA 200-5 mcg/actuation inhaler  Generic drug: mometasone-formoterol     finasteride 5 mg tablet  Commonly known as: PROSCAR     folic acid 1 MG tablet  Commonly known as: FOLVITE     folic acid-vit B6-vit B12 2.5-25-2 mg 2.5-25-2 mg Tab  Commonly known as: FOLBIC or Equiv  Take 1 tablet by mouth once daily.     liothyronine 5 MCG Tab  Commonly known as: CYTOMEL  Take 2 tablets (10 mcg total) by mouth once daily.     methylphenidate HCl 20 MG tablet  Commonly known as: RITALIN     minoxidiL 2.5 MG tablet  Commonly known as: LONITEN     oxybutynin 10 MG 24 hr tablet  Commonly known as: DITROPAN-XL     pantoprazole 40 MG tablet  Commonly known as: PROTONIX     QUEtiapine 200 MG Tab  Commonly known as: SEROQUEL  Take 1 tablet (200 mg total) by mouth every evening.     VIIBRYD 20 mg Tab  Generic drug: vilazodone     zolpidem 10 mg Tab  Commonly known as: AMBIEN            ASK your doctor about these medications      cefdinir 300 MG capsule  Commonly known as: OMNICEF  Take 1 capsule (300 mg total) by mouth 2 (two) times daily. for 3 days  Ask about: Should I take this medication?               Where to Get Your Medications        These medications were sent to [facility] - [address]  [address] [place] [address] Floor [address] [address]      Phone: [phone]   cefdinir 300 MG capsule          Explained in detail to the patient about the discharge plan, medications, and follow-up visits. Pt understands and agrees with the treatment plan  Discharge Disposition: Home or Self Care   Discharged Condition: stable  Diet-    Medications Per DC med rec  Activities as tolerated   Follow-up Information       No, Primary Doctor Follow up in 1 week(s).                            For further questions contact hospitalist office    Discharge time 35 minutes    For worsening symptoms, chest pain, shortness of breath, increased abdominal pain, high grade fever, stroke or stroke like symptoms, immediately go to the nearest Emergency Room or call 911 as soon as possible.      Louie Mcgovern M.D, on 10/4/2023. at 4:22 PM.

## 2023-10-05 ENCOUNTER — HOSPITAL ENCOUNTER (OUTPATIENT)
Dept: RADIOLOGY | Facility: HOSPITAL | Age: 63
Discharge: HOME | End: 2023-10-05
Attending: STUDENT IN AN ORGANIZED HEALTH CARE EDUCATION/TRAINING PROGRAM
Payer: MEDICARE

## 2023-10-05 DIAGNOSIS — Z12.31 BREAST CANCER SCREENING BY MAMMOGRAM: ICD-10-CM

## 2023-10-05 DIAGNOSIS — Z78.0 MENOPAUSE: ICD-10-CM

## 2023-10-05 PROCEDURE — 77067 SCR MAMMO BI INCL CAD: CPT | Mod: 26,,, | Performed by: RADIOLOGY

## 2023-10-05 PROCEDURE — 77067 SCR MAMMO BI INCL CAD: CPT | Mod: TC

## 2023-10-05 PROCEDURE — 77080 DXA BONE DENSITY AXIAL: CPT | Mod: 26,,, | Performed by: RADIOLOGY

## 2023-10-05 PROCEDURE — 77067 MAMMO DIGITAL SCREENING BILAT WITH TOMO: ICD-10-PCS | Mod: 26,,, | Performed by: RADIOLOGY

## 2023-10-05 PROCEDURE — 77080 DXA BONE DENSITY AXIAL SKELETON 1 OR MORE SITES: ICD-10-PCS | Mod: 26,,, | Performed by: RADIOLOGY

## 2023-10-05 PROCEDURE — 77080 DXA BONE DENSITY AXIAL: CPT | Mod: TC

## 2023-10-05 PROCEDURE — 77063 BREAST TOMOSYNTHESIS BI: CPT | Mod: 26,,, | Performed by: RADIOLOGY

## 2023-10-05 PROCEDURE — 77063 MAMMO DIGITAL SCREENING BILAT WITH TOMO: ICD-10-PCS | Mod: 26,,, | Performed by: RADIOLOGY

## 2023-10-06 ENCOUNTER — OFFICE VISIT (OUTPATIENT)
Dept: FAMILY MEDICINE | Facility: CLINIC | Age: 63
End: 2023-10-06
Payer: MEDICARE

## 2023-10-06 VITALS
OXYGEN SATURATION: 97 % | WEIGHT: 186.13 LBS | HEIGHT: 65 IN | HEART RATE: 90 BPM | RESPIRATION RATE: 17 BRPM | SYSTOLIC BLOOD PRESSURE: 130 MMHG | TEMPERATURE: 98 F | DIASTOLIC BLOOD PRESSURE: 88 MMHG | BODY MASS INDEX: 31.01 KG/M2

## 2023-10-06 DIAGNOSIS — K13.79 RECURRENT ORAL ULCERS: Primary | ICD-10-CM

## 2023-10-06 PROCEDURE — 3079F DIAST BP 80-89 MM HG: CPT | Mod: CPTII,,, | Performed by: STUDENT IN AN ORGANIZED HEALTH CARE EDUCATION/TRAINING PROGRAM

## 2023-10-06 PROCEDURE — 3008F BODY MASS INDEX DOCD: CPT | Mod: CPTII,,, | Performed by: STUDENT IN AN ORGANIZED HEALTH CARE EDUCATION/TRAINING PROGRAM

## 2023-10-06 PROCEDURE — 1101F PR PT FALLS ASSESS DOC 0-1 FALLS W/OUT INJ PAST YR: ICD-10-PCS | Mod: CPTII,,, | Performed by: STUDENT IN AN ORGANIZED HEALTH CARE EDUCATION/TRAINING PROGRAM

## 2023-10-06 PROCEDURE — 99213 OFFICE O/P EST LOW 20 MIN: CPT | Mod: ,,, | Performed by: STUDENT IN AN ORGANIZED HEALTH CARE EDUCATION/TRAINING PROGRAM

## 2023-10-06 PROCEDURE — 3008F PR BODY MASS INDEX (BMI) DOCUMENTED: ICD-10-PCS | Mod: CPTII,,, | Performed by: STUDENT IN AN ORGANIZED HEALTH CARE EDUCATION/TRAINING PROGRAM

## 2023-10-06 PROCEDURE — 3044F PR MOST RECENT HEMOGLOBIN A1C LEVEL <7.0%: ICD-10-PCS | Mod: CPTII,,, | Performed by: STUDENT IN AN ORGANIZED HEALTH CARE EDUCATION/TRAINING PROGRAM

## 2023-10-06 PROCEDURE — 1159F PR MEDICATION LIST DOCUMENTED IN MEDICAL RECORD: ICD-10-PCS | Mod: CPTII,,, | Performed by: STUDENT IN AN ORGANIZED HEALTH CARE EDUCATION/TRAINING PROGRAM

## 2023-10-06 PROCEDURE — 99213 PR OFFICE/OUTPT VISIT, EST, LEVL III, 20-29 MIN: ICD-10-PCS | Mod: ,,, | Performed by: STUDENT IN AN ORGANIZED HEALTH CARE EDUCATION/TRAINING PROGRAM

## 2023-10-06 PROCEDURE — 3044F HG A1C LEVEL LT 7.0%: CPT | Mod: CPTII,,, | Performed by: STUDENT IN AN ORGANIZED HEALTH CARE EDUCATION/TRAINING PROGRAM

## 2023-10-06 PROCEDURE — 1101F PT FALLS ASSESS-DOCD LE1/YR: CPT | Mod: CPTII,,, | Performed by: STUDENT IN AN ORGANIZED HEALTH CARE EDUCATION/TRAINING PROGRAM

## 2023-10-06 PROCEDURE — 3075F SYST BP GE 130 - 139MM HG: CPT | Mod: CPTII,,, | Performed by: STUDENT IN AN ORGANIZED HEALTH CARE EDUCATION/TRAINING PROGRAM

## 2023-10-06 PROCEDURE — 1159F MED LIST DOCD IN RCRD: CPT | Mod: CPTII,,, | Performed by: STUDENT IN AN ORGANIZED HEALTH CARE EDUCATION/TRAINING PROGRAM

## 2023-10-06 PROCEDURE — 1160F PR REVIEW ALL MEDS BY PRESCRIBER/CLIN PHARMACIST DOCUMENTED: ICD-10-PCS | Mod: CPTII,,, | Performed by: STUDENT IN AN ORGANIZED HEALTH CARE EDUCATION/TRAINING PROGRAM

## 2023-10-06 PROCEDURE — 3075F PR MOST RECENT SYSTOLIC BLOOD PRESS GE 130-139MM HG: ICD-10-PCS | Mod: CPTII,,, | Performed by: STUDENT IN AN ORGANIZED HEALTH CARE EDUCATION/TRAINING PROGRAM

## 2023-10-06 PROCEDURE — 3288F FALL RISK ASSESSMENT DOCD: CPT | Mod: CPTII,,, | Performed by: STUDENT IN AN ORGANIZED HEALTH CARE EDUCATION/TRAINING PROGRAM

## 2023-10-06 PROCEDURE — 3079F PR MOST RECENT DIASTOLIC BLOOD PRESSURE 80-89 MM HG: ICD-10-PCS | Mod: CPTII,,, | Performed by: STUDENT IN AN ORGANIZED HEALTH CARE EDUCATION/TRAINING PROGRAM

## 2023-10-06 PROCEDURE — 3288F PR FALLS RISK ASSESSMENT DOCUMENTED: ICD-10-PCS | Mod: CPTII,,, | Performed by: STUDENT IN AN ORGANIZED HEALTH CARE EDUCATION/TRAINING PROGRAM

## 2023-10-06 PROCEDURE — 1160F RVW MEDS BY RX/DR IN RCRD: CPT | Mod: CPTII,,, | Performed by: STUDENT IN AN ORGANIZED HEALTH CARE EDUCATION/TRAINING PROGRAM

## 2023-10-06 NOTE — PROGRESS NOTES
Patient ID: [id number]     Chief Complaint: Referral (ENT)        HPI:      Disclaimer:  This note is prepared using voice recognition software and as such is likely to have errors despite attempts at proofreading. Please contact me for questions.     Selena Cunningham is a 63 y.o. female here today for the following      Acute Issues-  Patient reports that she has been having recurrent oral ulcers all her life.  Reports of never having a workup.  Had a biopsy in past which was negative for malignancy.  Has been using lidocaine for anesthesia.     Chronic Issues-    ----------------------------  ADHD  ADHD (attention deficit hyperactivity disorder)  Anxiety  Aphthous ulcer  Asthma  Constipation  Depression  Depression  Dysphagia  Epigastric pain  Fatigue  Fatigue  Folate deficiency  Generalized anxiety disorder  GERD (gastroesophageal reflux disease)  Hx of psychiatric care  Macrocytosis  Obesity, unspecified  Psychiatric problem     Past Surgical History:   Procedure Laterality Date    APPENDECTOMY      CATARACT EXTRACTION Bilateral 04/2023    CHOLECYSTECTOMY  2023    COLONOSCOPY N/A 05/11/2022    Procedure: COLONOSCOPY;  Surgeon: Damion Martinez MD;  Location: [facility] ENDOSCOPY;  Service: Gastroenterology;  Laterality: N/A;    HYSTERECTOMY      LAPAROSCOPIC CHOLECYSTECTOMY      NASAL SINUS SURGERY      TONSILLECTOMY         Review of patient's allergies indicates:   Allergen Reactions    Pcn [penicillins] Rash       Current Outpatient Medications   Medication Instructions    albuterol (PROVENTIL) 2 mg, Oral, 3 times daily    buPROPion (WELLBUTRIN XL) 300 mg, Oral, Daily    clonazePAM (KLONOPIN) 1 mg, Oral, 2 times daily PRN    clopidogreL (PLAVIX) 75 mg, Oral, Daily    diphenhydrAMINE-aluminum-magnesium hydroxide-simethicone-LIDOcaine HCl 2% 15 mLs, Swish & Spit, Every 4 hours PRN    estradioL (ESTRACE) 1 g, Vaginal, Twice weekly    finasteride (PROSCAR) 5 mg, Oral, Daily    folic acid (FOLVITE) 1 mg,  Oral, Daily    folic acid-vit B6-vit B12 2.5-25-2 mg (FOLBIC OR EQUIV) 2.5-25-2 mg Tab 1 tablet, Oral, Daily    ketoconazole (NIZORAL) 2 % shampoo 120 mLs, Topical (Top), Twice weekly    liothyronine (CYTOMEL) 10 mcg, Oral, Daily    meloxicam (MOBIC) 15 mg, Oral, Daily PRN    methylphenidate HCl (RITALIN) 20 mg, Oral, 2 times daily    minoxidiL (LONITEN) 2.5 mg, Oral, Daily    mometasone-formoterol (DULERA) 200-5 mcg/actuation inhaler 2 puffs, Inhalation, 2 times daily, Controller    omega-3 fatty acids/fish oil (FISH OIL-OMEGA-3 FATTY ACIDS) 300-1,000 mg capsule 1 capsule, Oral, Daily    oxybutynin (DITROPAN-XL) 10 mg, Oral, Daily    pantoprazole (PROTONIX) 40 mg, Oral, Daily    QUEtiapine (SEROQUEL) 200 mg, Oral, Nightly    vilazodone (VIIBRYD) 20 mg, Oral, Daily    zolpidem (AMBIEN) 10 mg, Oral, Nightly PRN       Social History     Socioeconomic History    Marital status:     Number of children: 2   Tobacco Use    Smoking status: Former     Passive exposure: Past    Smokeless tobacco: Never   Substance and Sexual Activity    Alcohol use: No    Drug use: No    Sexual activity: Not Currently        Family History   Problem Relation Age of Onset    Drug abuse Brother         Patient Care Team:  Tennille Conrad MD as PCP - General (Family Medicine)     Subjective:     ROS    See HPI for details    Constitutional: Denies Change in appetite. Denies Chills. Denies Fever. Denies Night sweats.  ENT- as HPI   Respiratory: Denies Cough. Denies Shortness of breath. Denies Shortness of breath with exertion. Denies Wheezing.  Cardiovascular: DeniesChest pain at rest. Denies Chest pain with exertion. Denies Irregular heartbeat. Denies Palpitations. Denies Edema.  Gastrointestinal: Denies Abdominal pain. DeniesDiarrhea. Denies Nausea. Denies Vomiting. Denies Hematemesis or Hematochezia.  Genitourinary: Denies Dysuria. Denies Urinary frequency. Denies Urinary urgency. Denies Blood in urine.  Endocrine: Denies Cold  "intolerance. Denies Excessive thirst. Denies Heat intolerance. Denies Weight loss. Denies Weight gain.  Musculoskeletal: Denies Painful joints. Denies Weakness.  Integumentary: Denies Rash. Denies Itching. Denies Dry skin.  Neurologic: Denies Dizziness. Denies Fainting. Denies Headache.  Psychiatric: Denies Depression. Denies Anxiety. Denies Suicidal/Homicidal ideations.    All Other ROS: Negative except as stated in HPI.  Objective:     Visit Vitals  /88 (BP Location: Right arm, Patient Position: Sitting, BP Method: Large (Manual))   Pulse 90   Temp 98.3 °F (36.8 °C) (Oral)   Resp 17   Ht 5' 5" (1.651 m)   Wt 84.4 kg (186 lb 1.6 oz)   SpO2 97%   BMI 30.97 kg/m²       Physical Exam    General: Alert and oriented, No acute distress.  ENT- small ulcer in the oral mucosa      Musculoskeletal: Normal range of motion.  Extremities: No clubbing, cyanosis or edema  Neurologic: No focal deficits  Psychiatric: Normal interaction, Coherent speech, Euthymic mood, Appropriate affect   Assessment:       ICD-10-CM ICD-9-CM   1. Recurrent oral ulcers  K13.79 528.9        Plan:     1. Recurrent oral ulcers  -     ANTINUCLEAR ANTIBODIES COMPREHENSIVE PANEL; Future; Expected date: 10/06/2023  -     Vitamin B12; Future; Expected date: 10/06/2023  -     Sedimentation rate; Future; Expected date: 10/06/2023  -     Herpes simplex Virus (HSV) Type 1 & 2 DNA by PCR; Future; Expected date: 10/06/2023  -     Endomysial antibody, IgA titer; Future; Expected date: 10/06/2023  -     C-Reactive Protein; Future; Expected date: 10/06/2023  -     Clostridium Diff Toxin, A & B, EIA; Future; Expected date: 10/06/2023  -     Calprotectin, Stool; Future; Expected date: 10/06/2023  -     Discontinue: diphenhydrAMINE-aluminum-magnesium hydroxide-simethicone-LIDOcaine HCl 2%; Swish and spit 15 mLs every 4 (four) hours as needed (PAIN).  Dispense: 1 each; Refill: 0  -     Ambulatory referral/consult to ENT; Future; Expected date: 10/13/2023  -     " diphenhydrAMINE-aluminum-magnesium hydroxide-simethicone-LIDOcaine HCl 2%; Swish and spit 15 mLs every 4 (four) hours as needed (PAIN).  Dispense: 1 each; Refill: 0           Medication List with Changes/Refills   New Medications    DIPHENHYDRAMINE-ALUMINUM-MAGNESIUM HYDROXIDE-SIMETHICONE-LIDOCAINE HCL 2%    Swish and spit 15 mLs every 4 (four) hours as needed (PAIN).       Start Date: 10/6/2023 End Date: --   Current Medications    ALBUTEROL (PROVENTIL) 2 MG TAB    Take 2 mg by mouth 3 (three) times daily.       Start Date: --        End Date: --    BUPROPION (WELLBUTRIN XL) 150 MG TB24 TABLET    Take 300 mg by mouth once daily.       Start Date: --        End Date: --    CLONAZEPAM (KLONOPIN) 1 MG TABLET    Take 1 mg by mouth 2 (two) times daily as needed for Anxiety.       Start Date: --        End Date: --    CLOPIDOGREL (PLAVIX) 75 MG TABLET    Take 75 mg by mouth once daily.       Start Date: --        End Date: --    ESTRADIOL (ESTRACE) 0.01 % (0.1 MG/GRAM) VAGINAL CREAM    Place 1 g vaginally twice a week.       Start Date: 8/28/2023 End Date: --    FINASTERIDE (PROSCAR) 5 MG TABLET    Take 5 mg by mouth once daily.       Start Date: --        End Date: --    FOLIC ACID (FOLVITE) 1 MG TABLET    Take 1 mg by mouth once daily.       Start Date: --        End Date: --    FOLIC ACID-VIT B6-VIT B12 2.5-25-2 MG (FOLBIC OR EQUIV) 2.5-25-2 MG TAB    Take 1 tablet by mouth once daily.       Start Date: 12/8/2016 End Date: --    KETOCONAZOLE (NIZORAL) 2 % SHAMPOO    Apply 120 mLs topically twice a week.       Start Date: 8/27/2023 End Date: --    LIOTHYRONINE (CYTOMEL) 5 MCG TAB    Take 2 tablets (10 mcg total) by mouth once daily.       Start Date: 12/8/2016 End Date: 9/21/2023    MELOXICAM (MOBIC) 15 MG TABLET    Take 15 mg by mouth daily as needed.       Start Date: 12/22/2021End Date: --    METHYLPHENIDATE HCL (RITALIN) 20 MG TABLET    Take 20 mg by mouth 2 (two) times daily.       Start Date: --        End Date:  --    MINOXIDIL (LONITEN) 2.5 MG TABLET    Take 2.5 mg by mouth once daily.       Start Date: --        End Date: --    MOMETASONE-FORMOTEROL (DULERA) 200-5 MCG/ACTUATION INHALER    Inhale 2 puffs into the lungs 2 (two) times daily. Controller       Start Date: --        End Date: --    OMEGA-3 FATTY ACIDS/FISH OIL (FISH OIL-OMEGA-3 FATTY ACIDS) 300-1,000 MG CAPSULE    Take 1 capsule by mouth once daily.       Start Date: --        End Date: --    OXYBUTYNIN (DITROPAN-XL) 10 MG 24 HR TABLET    Take 10 mg by mouth once daily.       Start Date: --        End Date: --    PANTOPRAZOLE (PROTONIX) 40 MG TABLET    Take 40 mg by mouth once daily.       Start Date: --        End Date: --    QUETIAPINE (SEROQUEL) 200 MG TAB    Take 1 tablet (200 mg total) by mouth every evening.       Start Date: 12/8/2016 End Date: 9/21/2023    VILAZODONE (VIIBRYD) 20 MG TAB    Take 20 mg by mouth Daily.       Start Date: --        End Date: --    ZOLPIDEM (AMBIEN) 10 MG TAB    Take 10 mg by mouth nightly as needed.       Start Date: --        End Date: --   Discontinued Medications    DIPHENHYDRAMINE (BENADRYL) 50 MG CAPSULE    Take 50 mg by mouth every 6 (six) hours as needed for Itching.       Start Date: --        End Date: 10/6/2023          Follow up for Keep scheduled f/u .   In addition to their scheduled follow up, the patient has also been instructed to follow up on as needed basis.

## 2023-10-09 ENCOUNTER — TELEPHONE (OUTPATIENT)
Dept: FAMILY MEDICINE | Facility: CLINIC | Age: 63
End: 2023-10-09
Payer: MEDICARE

## 2023-10-09 DIAGNOSIS — K13.79 RECURRENT ORAL ULCERS: Primary | ICD-10-CM

## 2023-10-09 NOTE — PROGRESS NOTES
Abnormal mammogram. Needs further imaging.     Recommend a right diagnostic mammogram (to include magnification and spot compression tomosynthesis views) and possible right breast ultrasound if needed.  [facility] staff will contact the patient to schedule these additional imaging studies. Please ensure this has been set up with them.       Thanks,    Dr. Conrad

## 2023-10-09 NOTE — TELEPHONE ENCOUNTER
----- Message from Shelly Bill sent at 10/7/2023 10:29 AM CDT -----  .Type:  Needs Medical Advice    Who Called: pt     Pharmacy name and phone #:  [facility]   Would the patient rather a call back or a response via [facility]? Call back   Best Call Back Number: [phone]  Additional Information: pt needs script sent to [facility] in [place], (Magic mouthwash) 1:1 Benadryl 12.5mg/5ml liq, mylanta, LIDOcaine viscous 2%, nystatin 100,000u, prednisolone 15mg/5mL, distilled water, [facility] didn't have it

## 2023-10-09 NOTE — TELEPHONE ENCOUNTER
----- Message from Tennille Conrad MD sent at 10/9/2023 10:38 AM CDT -----  Please inform patient of DEXA Scan showing        Normal bone mineral density.     Continue  Weight bearing exercise such as walking or resistance training to build bones 5 times a week.  Avoid soda and excessive alcohol.  Avoid falls by assessing home for loose cords and rugs, wearing proper footwear, and using proper lighting in rooms.       Thanks  Dr. Conrad

## 2023-10-09 NOTE — TELEPHONE ENCOUNTER
----- Message from Shauna Saleh sent at 10/9/2023  9:23 AM CDT -----  Regarding: pharmacy  .Type:  Pharmacy Calling to Clarify an RX    Name of Caller:  Pharmacy Name:[facility] [place] [address]   Phone: [phone]  Prescription Name:(Magic mouthwash) 1:1 Benadryl 12.5mg/5ml liq, mylanta, LIDOcaine viscous 2%, nystatin 100,000u, prednisolone 15mg/5mL, distilled water  What do they need to clarify?:  Best Call Back Number:[phone]  Additional Information: requesting a call back ABL take a message

## 2023-10-09 NOTE — PROGRESS NOTES
Please inform patient of DEXA Scan showing        Normal bone mineral density.     Continue  Weight bearing exercise such as walking or resistance training to build bones 5 times a week.  Avoid soda and excessive alcohol.  Avoid falls by assessing home for loose cords and rugs, wearing proper footwear, and using proper lighting in rooms.       Thanks  Dr. Conrad

## 2023-10-09 NOTE — TELEPHONE ENCOUNTER
----- Message from Tennille Conrad MD sent at 10/9/2023  1:06 PM CDT -----  Abnormal mammogram. Needs further imaging.     Recommend a right diagnostic mammogram (to include magnification and spot compression tomosynthesis views) and possible right breast ultrasound if needed.  [facility] staff will contact the patient to schedule these additional imaging studies. Please ensure this has been set up with them.       Thanks,    Dr. Conrad

## 2023-10-19 ENCOUNTER — HOSPITAL ENCOUNTER (OUTPATIENT)
Dept: RADIOLOGY | Facility: HOSPITAL | Age: 63
Discharge: HOME | End: 2023-10-19
Attending: STUDENT IN AN ORGANIZED HEALTH CARE EDUCATION/TRAINING PROGRAM
Payer: MEDICARE

## 2023-10-19 VITALS — WEIGHT: 186 LBS | BODY MASS INDEX: 30.99 KG/M2 | HEIGHT: 65 IN

## 2023-10-19 DIAGNOSIS — R92.8 ABNORMAL MAMMOGRAM: ICD-10-CM

## 2023-10-19 PROCEDURE — 76642 US BREAST RIGHT LIMITED: ICD-10-PCS | Mod: 26,RT,, | Performed by: RADIOLOGY

## 2023-10-19 PROCEDURE — 76642 ULTRASOUND BREAST LIMITED: CPT | Mod: 26,RT,, | Performed by: RADIOLOGY

## 2023-10-19 PROCEDURE — 77065 MAMMO DIGITAL DIAGNOSTIC RIGHT WITH TOMO: ICD-10-PCS | Mod: 26,RT,, | Performed by: RADIOLOGY

## 2023-10-19 PROCEDURE — 77061 BREAST TOMOSYNTHESIS UNI: CPT | Mod: 26,RT,, | Performed by: RADIOLOGY

## 2023-10-19 PROCEDURE — 77065 DX MAMMO INCL CAD UNI: CPT | Mod: 26,RT,, | Performed by: RADIOLOGY

## 2023-10-19 PROCEDURE — 77061 MAMMO DIGITAL DIAGNOSTIC RIGHT WITH TOMO: ICD-10-PCS | Mod: 26,RT,, | Performed by: RADIOLOGY

## 2023-10-19 PROCEDURE — 77065 DX MAMMO INCL CAD UNI: CPT | Mod: TC,RT

## 2023-10-19 PROCEDURE — 76642 ULTRASOUND BREAST LIMITED: CPT | Mod: TC,RT

## 2023-10-24 ENCOUNTER — TELEPHONE (OUTPATIENT)
Dept: FAMILY MEDICINE | Facility: CLINIC | Age: 63
End: 2023-10-24
Payer: MEDICARE

## 2023-10-24 NOTE — TELEPHONE ENCOUNTER
----- Message from Ariel Armstrong sent at 10/24/2023 10:45 AM CDT -----  .Type:  Needs Medical Advice    Who Called: Marcellus Carvalho's son  Symptoms (please be specific):    How long has patient had these symptoms:    Pharmacy name and phone #:    Would the patient rather a call back or a response via [facility]?   Best Call Back Number: [phone]  Additional Information: Requested to speak with a nurse about some referrals that the doctor had set up for her.

## 2023-10-24 NOTE — TELEPHONE ENCOUNTER
----- Message from Tennille Conrad MD sent at 10/24/2023 12:31 PM CDT -----  Normal MMG. Repeat in 1 year.

## 2023-10-24 NOTE — TELEPHONE ENCOUNTER
Patient is scheduled with psychiatrist Paramjit Rosado for   January 8, 2024 at 10:00 am. Patient notified

## 2023-10-25 NOTE — PROGRESS NOTES
1. No mammographic evidence of malignancy is seen involving the right breast.  No sonographic evidence of malignancy is seen involving the 12:00 right breast.     2. Simple cysts measuring up to 4 mm in the 12:00 right breast, 5 cm from the nipple, corresponds to the finding recalled from screening and is benign.     In the absence of significant clinical findings in the interval, a routine screening mammogram in one year is recommended.

## 2023-10-26 ENCOUNTER — TELEPHONE (OUTPATIENT)
Dept: FAMILY MEDICINE | Facility: CLINIC | Age: 63
End: 2023-10-26
Payer: MEDICARE

## 2023-10-26 NOTE — TELEPHONE ENCOUNTER
----- Message from April Stewart sent at 10/26/2023 11:37 AM CDT -----  Regarding: return call  .Type:  Patient Returning Call    Who Called: patient  Who Left Message for Patient:  Does the patient know what this is regarding?:  Would the patient rather a call back or a response via [facility]? Call back  Best Call Back Number: [phone]  Additional Information: patient was returning a missed call. Please advise.

## 2023-10-26 NOTE — TELEPHONE ENCOUNTER
----- Message from Tennille Conrad MD sent at 10/25/2023  5:34 PM CDT -----  1. No mammographic evidence of malignancy is seen involving the right breast.  No sonographic evidence of malignancy is seen involving the 12:00 right breast.     2. Simple cysts measuring up to 4 mm in the 12:00 right breast, 5 cm from the nipple, corresponds to the finding recalled from screening and is benign.     In the absence of significant clinical findings in the interval, a routine screening mammogram in one year is recommended.

## 2023-11-06 ENCOUNTER — LAB VISIT (OUTPATIENT)
Dept: LAB | Facility: HOSPITAL | Age: 63
End: 2023-11-06
Attending: STUDENT IN AN ORGANIZED HEALTH CARE EDUCATION/TRAINING PROGRAM
Payer: MEDICARE

## 2023-11-06 DIAGNOSIS — E03.9 HYPOTHYROIDISM, UNSPECIFIED TYPE: ICD-10-CM

## 2023-11-06 DIAGNOSIS — K13.79 RECURRENT ORAL ULCERS: ICD-10-CM

## 2023-11-06 DIAGNOSIS — D84.89 CONGENITAL LEUKOCYTE ADHERENCE DEFICIENCY: Primary | ICD-10-CM

## 2023-11-06 PROCEDURE — 86225 DNA ANTIBODY NATIVE: CPT

## 2023-11-06 PROCEDURE — 86235 NUCLEAR ANTIGEN ANTIBODY: CPT

## 2023-11-06 PROCEDURE — 36415 COLL VENOUS BLD VENIPUNCTURE: CPT

## 2023-11-06 PROCEDURE — 86231 EMA EACH IG CLASS: CPT

## 2023-11-06 PROCEDURE — 86317 IMMUNOASSAY INFECTIOUS AGENT: CPT

## 2023-11-08 ENCOUNTER — LAB REQUISITION (OUTPATIENT)
Dept: LAB | Facility: HOSPITAL | Age: 63
End: 2023-11-08
Payer: MEDICARE

## 2023-11-08 DIAGNOSIS — K13.79 OTHER LESIONS OF ORAL MUCOSA: ICD-10-CM

## 2023-11-08 PROCEDURE — 83993 ASSAY FOR CALPROTECTIN FECAL: CPT | Performed by: STUDENT IN AN ORGANIZED HEALTH CARE EDUCATION/TRAINING PROGRAM

## 2023-11-09 LAB
ANTINUCLEAR ANTIBODY SCREEN (OHS): NEGATIVE
CENTROMERE QUANT (OHS): <0.4 U/ML
DSDNA AB QUANT (OHS): <0.6 IU/ML
ENDOMYSIUM IGA SER QL IF: NEGATIVE
JO-1 AB QUANT (OHS): <0.3 U/ML
RNP70 AB QUANT (OHS): 0.7 U/ML
SCL-70S AB QUANT (OHS): <0.6 U/ML
SMITH AB QUANT (OHS): <0.8 U/ML
SSA(RO) AB QUANT (OHS): <0.4 U/ML
SSB(LA) AB QUANT (OHS): <0.4 U/ML
U1RNP AB QUANT (OHS): 0.9 U/ML

## 2023-11-10 ENCOUNTER — PATIENT MESSAGE (OUTPATIENT)
Dept: FAMILY MEDICINE | Facility: CLINIC | Age: 63
End: 2023-11-10
Payer: MEDICARE

## 2023-11-10 LAB
CALPROTECTIN STL-MCNT: <50 MCG/G
IMMUNOLOGIST REVIEW: NORMAL
S PN DA SERO 19F IGG SER-MCNC: 0.2 MCG/ML
S PNEUM DA 1 IGG SER-MCNC: 0.1 MCG/ML
S PNEUM DA 10A IGG SER-MCNC: 0.1 MCG/ML
S PNEUM DA 11A IGG SER-MCNC: <0.1 MCG/ML
S PNEUM DA 12F IGG SER-MCNC: <0.1 MCG/ML
S PNEUM DA 14 IGG SER-MCNC: <0.1 MCG/ML
S PNEUM DA 15B IGG SER-MCNC: 0.2 MCG/ML
S PNEUM DA 17F IGG SER-MCNC: 0.1 MCG/ML
S PNEUM DA 18C IGG SER-MCNC: 0.2 MCG/ML
S PNEUM DA 19A IGG SER-MCNC: 0.3 MCG/ML
S PNEUM DA 2 IGG SER-MCNC: <0.1 MCG/ML
S PNEUM DA 20A IGG SER-MCNC: 0.3 MCG/ML
S PNEUM DA 22F IGG SER-MCNC: 0.2 MCG/ML
S PNEUM DA 23F IGG SER-MCNC: 0.1 MCG/ML
S PNEUM DA 3 IGG SER-MCNC: <0.1 MCG/ML
S PNEUM DA 33F IGG SER-MCNC: 0.9 MCG/ML
S PNEUM DA 4 IGG SER-MCNC: <0.1 MCG/ML
S PNEUM DA 5 IGG SER-MCNC: <0.1 MCG/ML
S PNEUM DA 6B IGG SER-MCNC: 0.1 MCG/ML
S PNEUM DA 7F IGG SER-MCNC: 0.1 MCG/ML
S PNEUM DA 8 IGG SER-MCNC: 0.1 MCG/ML
S PNEUM DA 9N IGG SER-MCNC: 0.9 MCG/ML
S PNEUM DA 9V IGG SER-MCNC: 0.1 MCG/ML

## 2023-11-14 ENCOUNTER — TELEPHONE (OUTPATIENT)
Dept: FAMILY MEDICINE | Facility: CLINIC | Age: 63
End: 2023-11-14
Payer: MEDICARE

## 2023-11-14 NOTE — TELEPHONE ENCOUNTER
----- Message from Tennille Conrad MD sent at 11/13/2023  4:56 PM CST -----  All lab results within acceptable ranges.

## 2023-11-28 ENCOUNTER — TELEPHONE (OUTPATIENT)
Dept: FAMILY MEDICINE | Facility: CLINIC | Age: 63
End: 2023-11-28

## 2023-11-28 ENCOUNTER — OFFICE VISIT (OUTPATIENT)
Dept: FAMILY MEDICINE | Facility: CLINIC | Age: 63
End: 2023-11-28
Payer: MEDICARE

## 2023-11-28 VITALS
SYSTOLIC BLOOD PRESSURE: 120 MMHG | BODY MASS INDEX: 31.09 KG/M2 | WEIGHT: 186.63 LBS | HEIGHT: 65 IN | OXYGEN SATURATION: 97 % | TEMPERATURE: 98 F | HEART RATE: 90 BPM | RESPIRATION RATE: 17 BRPM | DIASTOLIC BLOOD PRESSURE: 80 MMHG

## 2023-11-28 DIAGNOSIS — Z00.00 WELLNESS EXAMINATION: Primary | ICD-10-CM

## 2023-11-28 DIAGNOSIS — D72.819 LEUKOPENIA, UNSPECIFIED TYPE: ICD-10-CM

## 2023-11-28 DIAGNOSIS — F90.0 ATTENTION DEFICIT HYPERACTIVITY DISORDER (ADHD), PREDOMINANTLY INATTENTIVE TYPE: ICD-10-CM

## 2023-11-28 DIAGNOSIS — K12.0 APHTHOUS ULCER OF MOUTH: ICD-10-CM

## 2023-11-28 DIAGNOSIS — Z82.49 FAMILY HISTORY OF CARDIAC DISORDER: ICD-10-CM

## 2023-11-28 DIAGNOSIS — E11.9 TYPE 2 DIABETES MELLITUS WITHOUT COMPLICATION, WITHOUT LONG-TERM CURRENT USE OF INSULIN: ICD-10-CM

## 2023-11-28 DIAGNOSIS — R94.31 ABNORMAL EKG: ICD-10-CM

## 2023-11-28 DIAGNOSIS — J45.20 MILD INTERMITTENT ASTHMA WITHOUT COMPLICATION: ICD-10-CM

## 2023-11-28 DIAGNOSIS — Z11.59 ENCOUNTER FOR HEPATITIS C SCREENING TEST FOR LOW RISK PATIENT: ICD-10-CM

## 2023-11-28 DIAGNOSIS — M54.50 CHRONIC BILATERAL LOW BACK PAIN WITHOUT SCIATICA: ICD-10-CM

## 2023-11-28 DIAGNOSIS — G89.29 CHRONIC BILATERAL LOW BACK PAIN WITHOUT SCIATICA: ICD-10-CM

## 2023-11-28 DIAGNOSIS — K21.9 GASTROESOPHAGEAL REFLUX DISEASE WITHOUT ESOPHAGITIS: ICD-10-CM

## 2023-11-28 DIAGNOSIS — E78.2 MIXED HYPERLIPIDEMIA: ICD-10-CM

## 2023-11-28 DIAGNOSIS — F32.A DEPRESSION, UNSPECIFIED DEPRESSION TYPE: ICD-10-CM

## 2023-11-28 DIAGNOSIS — E03.9 HYPOTHYROIDISM, UNSPECIFIED TYPE: ICD-10-CM

## 2023-11-28 PROBLEM — J45.909 ASTHMA: Status: ACTIVE | Noted: 2023-11-28

## 2023-11-28 PROBLEM — K59.00 CONSTIPATION: Status: ACTIVE | Noted: 2023-11-28

## 2023-11-28 PROBLEM — J30.9 ALLERGIC RHINITIS: Status: ACTIVE | Noted: 2018-03-21

## 2023-11-28 PROCEDURE — 3044F HG A1C LEVEL LT 7.0%: CPT | Mod: CPTII,,, | Performed by: STUDENT IN AN ORGANIZED HEALTH CARE EDUCATION/TRAINING PROGRAM

## 2023-11-28 PROCEDURE — 3074F PR MOST RECENT SYSTOLIC BLOOD PRESSURE < 130 MM HG: ICD-10-PCS | Mod: CPTII,,, | Performed by: STUDENT IN AN ORGANIZED HEALTH CARE EDUCATION/TRAINING PROGRAM

## 2023-11-28 PROCEDURE — 3079F DIAST BP 80-89 MM HG: CPT | Mod: CPTII,,, | Performed by: STUDENT IN AN ORGANIZED HEALTH CARE EDUCATION/TRAINING PROGRAM

## 2023-11-28 PROCEDURE — G0402 PR WELCOME MEDICARE PREVENTIVE VISIT NEW ENROLLEE: ICD-10-PCS | Mod: ,,, | Performed by: STUDENT IN AN ORGANIZED HEALTH CARE EDUCATION/TRAINING PROGRAM

## 2023-11-28 PROCEDURE — 3074F SYST BP LT 130 MM HG: CPT | Mod: CPTII,,, | Performed by: STUDENT IN AN ORGANIZED HEALTH CARE EDUCATION/TRAINING PROGRAM

## 2023-11-28 PROCEDURE — 3044F PR MOST RECENT HEMOGLOBIN A1C LEVEL <7.0%: ICD-10-PCS | Mod: CPTII,,, | Performed by: STUDENT IN AN ORGANIZED HEALTH CARE EDUCATION/TRAINING PROGRAM

## 2023-11-28 PROCEDURE — G0402 INITIAL PREVENTIVE EXAM: HCPCS | Mod: ,,, | Performed by: STUDENT IN AN ORGANIZED HEALTH CARE EDUCATION/TRAINING PROGRAM

## 2023-11-28 PROCEDURE — 1159F MED LIST DOCD IN RCRD: CPT | Mod: CPTII,,, | Performed by: STUDENT IN AN ORGANIZED HEALTH CARE EDUCATION/TRAINING PROGRAM

## 2023-11-28 PROCEDURE — 99213 OFFICE O/P EST LOW 20 MIN: CPT | Mod: 25,,, | Performed by: STUDENT IN AN ORGANIZED HEALTH CARE EDUCATION/TRAINING PROGRAM

## 2023-11-28 PROCEDURE — 99213 PR OFFICE/OUTPT VISIT, EST, LEVL III, 20-29 MIN: ICD-10-PCS | Mod: 25,,, | Performed by: STUDENT IN AN ORGANIZED HEALTH CARE EDUCATION/TRAINING PROGRAM

## 2023-11-28 PROCEDURE — 1101F PT FALLS ASSESS-DOCD LE1/YR: CPT | Mod: CPTII,,, | Performed by: STUDENT IN AN ORGANIZED HEALTH CARE EDUCATION/TRAINING PROGRAM

## 2023-11-28 PROCEDURE — 3079F PR MOST RECENT DIASTOLIC BLOOD PRESSURE 80-89 MM HG: ICD-10-PCS | Mod: CPTII,,, | Performed by: STUDENT IN AN ORGANIZED HEALTH CARE EDUCATION/TRAINING PROGRAM

## 2023-11-28 PROCEDURE — 1159F PR MEDICATION LIST DOCUMENTED IN MEDICAL RECORD: ICD-10-PCS | Mod: CPTII,,, | Performed by: STUDENT IN AN ORGANIZED HEALTH CARE EDUCATION/TRAINING PROGRAM

## 2023-11-28 PROCEDURE — 3008F BODY MASS INDEX DOCD: CPT | Mod: CPTII,,, | Performed by: STUDENT IN AN ORGANIZED HEALTH CARE EDUCATION/TRAINING PROGRAM

## 2023-11-28 PROCEDURE — 3008F PR BODY MASS INDEX (BMI) DOCUMENTED: ICD-10-PCS | Mod: CPTII,,, | Performed by: STUDENT IN AN ORGANIZED HEALTH CARE EDUCATION/TRAINING PROGRAM

## 2023-11-28 PROCEDURE — 1101F PR PT FALLS ASSESS DOC 0-1 FALLS W/OUT INJ PAST YR: ICD-10-PCS | Mod: CPTII,,, | Performed by: STUDENT IN AN ORGANIZED HEALTH CARE EDUCATION/TRAINING PROGRAM

## 2023-11-28 PROCEDURE — 3288F FALL RISK ASSESSMENT DOCD: CPT | Mod: CPTII,,, | Performed by: STUDENT IN AN ORGANIZED HEALTH CARE EDUCATION/TRAINING PROGRAM

## 2023-11-28 PROCEDURE — 3288F PR FALLS RISK ASSESSMENT DOCUMENTED: ICD-10-PCS | Mod: CPTII,,, | Performed by: STUDENT IN AN ORGANIZED HEALTH CARE EDUCATION/TRAINING PROGRAM

## 2023-11-28 RX ORDER — PREDNISONE 20 MG/1
20 TABLET ORAL 2 TIMES DAILY
Qty: 10 TABLET | Refills: 0 | Status: SHIPPED | OUTPATIENT
Start: 2023-11-28 | End: 2023-11-28 | Stop reason: SDUPTHER

## 2023-11-28 RX ORDER — MELOXICAM 7.5 MG/1
7.5 TABLET ORAL DAILY PRN
Qty: 15 TABLET | Refills: 0 | Status: SHIPPED | OUTPATIENT
Start: 2023-11-28 | End: 2023-12-13

## 2023-11-28 RX ORDER — PRAMOXINE HYDROCHLORIDE 10 MG/G
AEROSOL, FOAM TOPICAL
COMMUNITY

## 2023-11-28 RX ORDER — MELOXICAM 7.5 MG/1
7.5 TABLET ORAL DAILY PRN
Qty: 15 TABLET | Refills: 0 | Status: SHIPPED | OUTPATIENT
Start: 2023-11-28 | End: 2023-11-28 | Stop reason: SDUPTHER

## 2023-11-28 RX ORDER — PREDNISONE 20 MG/1
20 TABLET ORAL 2 TIMES DAILY
Qty: 10 TABLET | Refills: 0 | Status: SHIPPED | OUTPATIENT
Start: 2023-11-28 | End: 2023-12-03

## 2023-11-28 NOTE — PROGRESS NOTES
Patient ID: [id number]     Chief Complaint: Medicare AWV      HPI:      Disclaimer:  This note is prepared using voice recognition software and as such is likely to have errors despite attempts at proofreading. Please contact me for questions.     Selena Cunningham is a 63 y.o. female here today for a Medicare Wellness.     A separate E/M code has been provided to evaluate additional complaints that the patient would like addressed during the dedicated Medicare Wellness Exam.    The patient has following issues to discuss  Acute problem   Aphthous ulcer   Leukopenia   Chronic low back pain    Chronic  Depression   Generalized anxiety disorder   Currently seeing NP  but would like to change his psychiatrist and needs mental health counseling for depression. Waiting on appointment    Denies suicidal/homicidal symptoms   Currently on Wellbutrin and Trintellix per her psychiatrist    ADHD (attention deficit hyperactivity disorder)  Adderall x 30 mg x bid and is stable     Family history of sudden cardiac death  Father  at age 48   ASx  Reports of having her brothers , sisters and are not having coronary artery disease at earlier age  Cards referral sent      Hypothyroidism   Patient reports that she is off of her medication and is asymptomatic  Needs a new TSH and T4    Asthma  Well controlled on Ventolin  Never been hospitalized or intubated.    Aphthous ulcer  Saw Dr. Cisneros    GERD (gastroesophageal reflux disease)  On protonix    Insomnia  On Ambien  Past Surgical History:   Procedure Laterality Date    APPENDECTOMY      CATARACT EXTRACTION Bilateral 2023    CHOLECYSTECTOMY      COLONOSCOPY N/A 2022    Procedure: COLONOSCOPY;  Surgeon: Damion Martinez MD;  Location: [facility] ENDOSCOPY;  Service: Gastroenterology;  Laterality: N/A;    HYSTERECTOMY      LAPAROSCOPIC CHOLECYSTECTOMY      NASAL SINUS SURGERY      TONSILLECTOMY         Review of patient's allergies indicates:   Allergen Reactions     Pcn [penicillins] Rash       Current Outpatient Medications   Medication Instructions    (Magic mouthwash) 1:1 Benadryl 12.5mg/5ml liq, mylanta, LIDOcaine viscous 2%, nystatin 100,000u, prednisolone 15mg/5mL, distilled water 480 mLs, Swish & Swallow, Every 4 hours PRN    albuterol (PROVENTIL) 2 mg, Oral, 3 times daily    buPROPion (WELLBUTRIN XL) 300 mg, Oral, Daily    clonazePAM (KLONOPIN) 1 mg, Oral, 2 times daily PRN    clopidogreL (PLAVIX) 75 mg, Oral, Daily    estradioL (ESTRACE) 1 g, Vaginal, Twice weekly    finasteride (PROSCAR) 5 mg, Oral, Daily    folic acid (FOLVITE) 1 mg, Oral, Daily    folic acid-vit B6-vit B12 2.5-25-2 mg (FOLBIC OR EQUIV) 2.5-25-2 mg Tab 1 tablet, Oral, Daily    ketoconazole (NIZORAL) 2 % shampoo 120 mLs, Topical (Top), Twice weekly    liothyronine (CYTOMEL) 10 mcg, Oral, Daily    meloxicam (MOBIC) 15 mg, Oral, Daily PRN    methylphenidate HCl (RITALIN) 20 mg, Oral, 2 times daily    minoxidiL (LONITEN) 2.5 mg, Oral, Daily    mometasone-formoterol (DULERA) 200-5 mcg/actuation inhaler 2 puffs, Inhalation, 2 times daily, Controller    omega-3 fatty acids/fish oil (FISH OIL-OMEGA-3 FATTY ACIDS) 300-1,000 mg capsule 1 capsule, Oral, Daily    oxybutynin (DITROPAN-XL) 10 mg, Oral, Daily    pantoprazole (PROTONIX) 40 mg, Oral, Daily    pramoxine 1 % Foam Rectal, Every 10 days    QUEtiapine (SEROQUEL) 200 mg, Oral, Nightly    vilazodone (VIIBRYD) 20 mg, Oral, Daily    zolpidem (AMBIEN) 10 mg, Oral, Nightly PRN       Social History     Socioeconomic History    Marital status:     Number of children: 2   Tobacco Use    Smoking status: Former     Passive exposure: Past    Smokeless tobacco: Never   Substance and Sexual Activity    Alcohol use: No    Drug use: No    Sexual activity: Not Currently        Family History   Problem Relation Age of Onset    Drug abuse Brother         Patient Care Team:  Tennille Conrad MD as PCP - General (Family Medicine)       Subjective:     LOBO    See  HPI for details    Constitutional: Denies Change in appetite. Denies Chills. Denies Fever. Denies Night sweats.  Respiratory: Denies Cough. Denies Shortness of breath. Denies Shortness of breath with exertion. Denies Wheezing.  Cardiovascular: DeniesChest pain at rest. Denies Chest pain with exertion. Denies Irregular heartbeat. Denies Palpitations. Denies Edema.  Gastrointestinal: Denies Abdominal pain. DeniesDiarrhea. Denies Nausea. Denies Vomiting. Denies Hematemesis or Hematochezia.  Genitourinary: Denies Dysuria. Denies Urinary frequency. Denies Urinary urgency. Denies Blood in urine.  Endocrine: Denies Cold intolerance. Denies Excessive thirst. Denies Heat intolerance. Denies Weight loss. Denies Weight gain.  Musculoskeletal: Denies Painful joints. Denies Weakness.  Integumentary: Denies Rash. Denies Itching. Denies Dry skin.  Neurologic: Denies Dizziness. Denies Fainting. Denies Headache.  Psychiatric: Denies Depression. Denies Anxiety. Denies Suicidal/Homicidal ideations.    All Other ROS: Negative except as stated in HPI.     Patient Reported Health Risk Assessments:  What is your age?: 65-69  Are you male or female?: Female  During the past four weeks, how much have you been bothered by emotional problems such as feeling anxious, depressed, irritable, sad, or downhearted and blue?: Not at all  During the past five weeks, has your physical and/or emotional health limited your social activities with family, friends, neighbors, or groups?: Not at all  During the past four weeks, was someone available to help if you needed and wanted help?: Yes, as much as I wanted  Can you get to places out of walking distance without help?  (For example, can you travel alone on buses or taxis, or drive your own car?): Yes  Can you go shopping for groceries or clothes without someone's help?: Yes  Can you prepare your own meals?: Yes  Can you do your own housework without help?: Yes  Because of any health problems, do you  "need the help of another person with your personal care needs such as eating, bathing, dressing, or getting around the house?: No  Can you handle your own money without help?: Yes  Are you having difficulties driving your car?: No  Do you always fasten your seat belt when you are in a car?: Yes, usually  How often in the past four weeks have you been bothered by sexual problems?: Never  How often in the past four weeks have you been bothered by trouble eating well?: Never  How often in the past four weeks have you been bothered by teeth or denture problems?: Never  How often in the past four weeks have you been bothered with problems using the telephone?: Never  Have you fallen two or more times in the past year?: No  Are you afraid of falling?: No  Are you a smoker?: No  Have you been given any information to help you with hazards in your house that might hurt you?: No  Have you been given any information to help you with keeping track of your medications?: Yes  How often do you have trouble taking medicines the way you've been told to take them?: I always take them as prescribed  How confident are you that you can control and manage most of your health problems?: Very confident  What is your race? (Check all that apply.):     Objective:     Visit Vitals  /80 (BP Location: Right arm, Patient Position: Sitting, BP Method: Large (Manual))   Pulse 90   Temp 97.8 °F (36.6 °C) (Oral)   Resp 17   Ht 5' 5" (1.651 m)   Wt 84.6 kg (186 lb 9.6 oz)   SpO2 97%   BMI 31.05 kg/m²       Physical Exam    General: Alert and oriented, No acute distress.  Neck/Thyroid: Supple, Non-tender  Respiratory: Clear to auscultation bilaterally  Cardiovascular: Regular rate and rhythm  Gastrointestinal: Soft, Non-tender. No palpable organomegaly.  Musculoskeletal:   Musculoskeletal:  LUMBOSACRAL SPINE  Inspection: Normal gait; No erythema; No contusion or outward signs of trauma; No post surgical scars; Normal alignment without " visible deformities  Palpation: mild tender on the b/l  paraspinal area; No vertebral tenderness; Normal temperature; Sciatic Notch Pain: Negative  ROM:  Decreased   Special Tests:       Straight Leg Raise: Negative  Neurovascular: Intact, 2+ distal pulses    Neurologic: No focal deficits  Psychiatric: Normal interaction, Coherent speech, Euthymic mood, Appropriate affect       Assessment:       ICD-10-CM ICD-9-CM   1. Wellness examination  Z00.00 V70.0   2. Attention deficit hyperactivity disorder (ADHD), predominantly inattentive type  F90.0 314.00   3. Depression, unspecified depression type  F32.A 311   4. Aphthous ulcer of mouth  K12.0 528.2   5. Mild intermittent asthma without complication  J45.20 493.90   6. Type 2 diabetes mellitus without complication, without long-term current use of insulin  E11.9 250.00   7. Gastroesophageal reflux disease without esophagitis  K21.9 530.81   8. Leukopenia, unspecified type  D72.819 288.50   9. Encounter for hepatitis C screening test for low risk patient  Z11.59 V73.89   10. Family history of cardiac disorder  Z82.49 V17.49   11. Abnormal EKG  R94.31 794.31   12. Chronic bilateral low back pain without sciatica  M54.50 724.2    G89.29 338.29   13. Hypothyroidism, unspecified type  E03.9 244.9   14. Mixed hyperlipidemia  E78.2 272.2   15. Aphthous ulcer  K12.0 528.2        Plan:       Medicare Annual Wellness and Personalized Prevention Plan:     Fall Risk + Home Safety + Living Situation + Whisper Test + Depression Screen + CAGE + Cognitive Impairment Screen + ADL Screen + Timed Get Up and Go + Nutrition Screen + PAQ Screen + Health Risk Assessment all reviewed.          No data to display                  11/28/2023    10:45 AM 10/6/2023    10:45 AM 9/22/2023     8:30 AM   Fall Risk Assessment - Outpatient   Mobility Status Ambulatory Ambulatory Ambulatory   Number of falls 0 0 2+   Identified as fall risk False False True                   Depression Screening  Over  the past two weeks, has the patient felt down, depressed, or hopeless?: No  Over the past two weeks, has the patient felt little interest or pleasure in doing things?: No  Functional Ability/Safety Screening  Was the patient's timed Up & Go test unsteady or longer than 30 seconds?: No  Does the patient need help with phone, transportation, shopping, preparing meals, housework, laundry, meds, or managing money?: No  Have you noticed any hearing difficulties?: No  Cognitive Function (Assessed through direct observation with due consideration of information obtained by way of patient reports and/or concerns raised by family, friends, caretakers, or others)    Does the patient repeat questions/statements in the same day?: No  Does the patient have trouble remembering the date, year, and time?: No  Does the patient have difficulty managing finances?: No  Does the patient have a decreased sense of direction?: No         Alcohol/Tobacco Use - Denies of smoking and alcohol intake  CVD Risk Factors - Reviewed as above  Obesity/Physical Activity -Body mass index is 31.05 kg/m².   Encouraged daily 30 minute physical activity x 5 days per week.    Opioid Screening: Patient medication list reviewed, patient is not taking prescription opioids. Patient is not using additional opioids than prescribed. Patient is not at risk of substance abuse based on this opioid use history.     Advance Directives:   Living Will: No        Oral Declaration: No    [place]POST: No    Do Not Resuscitate Status: No (Full code Enid yun Daughter [phone] (Mobile))    Medical Power of : No        Oral Declaration: No      Decision Making:  Patient answered questions  Goals of Care: The patient endorses that what is most important right now is to focus on spending time at home    Accordingly, we have decided that the best plan to meet the patient's goals includes continuing with treatment    Advance Care Planning   I attest to discussing Advance  Care Planning with patient and/or family member.  Education was provided including the importance of the Health Care Power of , Advance Directives, and/or [place]POST documentation.  The patient expressed understanding to the importance of this information and discussion.  Goals of Care: The patient expressed that what is most important right now is to focus on:   Length of ACP conversation in minutes: 5 min        Health Maintenance Topics with due status: Not Due       Topic Last Completion Date    TETANUS VACCINE 11/23/2021    Colorectal Cancer Screening 05/11/2022    Hemoglobin A1c (Diabetic Prevention Screening) 09/20/2023    Lipid Panel 09/20/2023    DEXA Scan 10/05/2023    Mammogram 10/19/2023          Vaccinations -   Immunization History   Administered Date(s) Administered    COVID-19, MRNA, LN-S, PF (MODERNA FULL 0.5 ML DOSE) 11/05/2021    COVID-19, mRNA, LNP-S, bivalent booster, PF (PFIZER OMICRON) 01/10/2023    COVID-19, vector-nr, rS-Ad26, PF (PPDai) 03/09/2021    Influenza - Quadrivalent - High Dose - PF (65 years and older) 09/22/2023    Influenza - Quadrivalent - PF *Preferred* (6 months and older) 11/15/2017, 09/21/2021    Influenza - Trivalent - PF (ADULT) 09/21/2021    Tdap 11/23/2021        1. Wellness examination  AAA Screening -  (65-75)   Lung Cancer-(50-80) Smoker/ Ex smoker- non smoker  Colon Cancer Screening(45-75) - Colonoscopy in 2022, due in 5 years by Dr. Martinez   Cervical Cancer Screening (21-65)- S/p  JACKIE with [facility]  Breast Cancer Screening (40-74)- Last Mammogram-Normal.   Osteoporosis Screening(>65) - Last DEXA wnl  -due in 2026  Eye Exam - Last Eye Exam   Dental Exam - Recommend biannually    Diet discussed (healthy food choices, reduce portions and overall calorie intake)  Exercise 30-45 minutes 5x per week  Avoid excessive alcohol and tobacco if taking  Immunizations dicussed  Preventative exams discussed.    2. Attention deficit hyperactivity disorder (ADHD),  predominantly inattentive type  3. Depression, unspecified depression type  Continue Rx as prescribed   Continue mental health exercises like yoga  Keep scheduled appointment with primary  -     CBC Auto Differential; Future; Expected date: 11/28/2024  -     Comprehensive Metabolic Panel; Future; Expected date: 11/28/2024    4. Aphthous ulcer of mouth  Vitamin B12, folic acid results pending   Keep scheduled follow up with ENT   Magic mouthwash ordered     5. Mild intermittent asthma without complication  Well-controlled on Ventolin   Advair introduced but patient declined     6. Type 2 diabetes mellitus without complication, without long-term current use of insulin  -     Hemoglobin A1C; Future; Expected date: 11/28/2024  -     Urinalysis; Future; Expected date: 11/28/2024  -     Microalbumin/Creatinine Ratio, Urine; Future; Expected date: 11/28/2024  A1c at goal   Continue 35 minutes of brisk walking and Mediterranean diet    7. Gastroesophageal reflux disease without esophagitis  Continue Protonix as prescribed and avoid fatty food       8. Leukopenia, unspecified type  -     CBC Auto Differential; Future; Expected date: 11/28/2023  -     Vitamin B12; Future; Expected date: 11/28/2023  -     Folate; Future; Expected date: 11/28/2023  -     Path Review, Peripheral Smear; Future; Expected date: 11/28/2023      9. Encounter for hepatitis C screening test for low risk patient  -     Hepatitis C Antibody; Future; Expected date: 11/28/2023    10. Family history of cardiac disorder  -     Ambulatory referral/consult to Cardiology; Future; Expected date: 12/05/2023    11. Abnormal EKG  -     Ambulatory referral/consult to Cardiology; Future; Expected date: 12/05/2023    12. Chronic bilateral low back pain without sciatica  -     Discontinue: meloxicam (MOBIC) 7.5 MG tablet; Take 1 tablet (7.5 mg total) by mouth daily as needed for Pain.  Dispense: 15 tablet; Refill: 0  -     Discontinue: predniSONE (DELTASONE) 20 MG  tablet; Take 1 tablet (20 mg total) by mouth 2 (two) times daily. for 5 days  Dispense: 10 tablet; Refill: 0  -     meloxicam (MOBIC) 7.5 MG tablet; Take 1 tablet (7.5 mg total) by mouth daily as needed for Pain.  Dispense: 15 tablet; Refill: 0  -     predniSONE (DELTASONE) 20 MG tablet; Take 1 tablet (20 mg total) by mouth 2 (two) times daily. for 5 days  Dispense: 10 tablet; Refill: 0    13. Hypothyroidism, unspecified type  -     TSH; Future; Expected date: 11/28/2024  -     T4, Free; Future; Expected date: 11/28/2024    14. Mixed hyperlipidemia  -     Lipid Panel; Future; Expected date: 11/28/2024               Medication List with Changes/Refills   Current Medications    (MAGIC MOUTHWASH) 1:1 BENADRYL 12.5MG/5ML LIQ, MYLANTA, LIDOCAINE VISCOUS 2%, NYSTATIN 100,000U, PREDNISOLONE 15MG/5ML, DISTILLED WATER    Swish and swallow 480 mLs every 4 (four) hours as needed (pain).       Start Date: 10/9/2023 End Date: --    ALBUTEROL (PROVENTIL) 2 MG TAB    Take 2 mg by mouth 3 (three) times daily.       Start Date: --        End Date: --    BUPROPION (WELLBUTRIN XL) 150 MG TB24 TABLET    Take 300 mg by mouth once daily.       Start Date: --        End Date: --    CLONAZEPAM (KLONOPIN) 1 MG TABLET    Take 1 mg by mouth 2 (two) times daily as needed for Anxiety.       Start Date: --        End Date: --    CLOPIDOGREL (PLAVIX) 75 MG TABLET    Take 75 mg by mouth once daily.       Start Date: --        End Date: --    ESTRADIOL (ESTRACE) 0.01 % (0.1 MG/GRAM) VAGINAL CREAM    Place 1 g vaginally twice a week.       Start Date: 8/28/2023 End Date: --    FINASTERIDE (PROSCAR) 5 MG TABLET    Take 5 mg by mouth once daily.       Start Date: --        End Date: --    FOLIC ACID (FOLVITE) 1 MG TABLET    Take 1 mg by mouth once daily.       Start Date: --        End Date: --    FOLIC ACID-VIT B6-VIT B12 2.5-25-2 MG (FOLBIC OR EQUIV) 2.5-25-2 MG TAB    Take 1 tablet by mouth once daily.       Start Date: 12/8/2016 End Date: --     KETOCONAZOLE (NIZORAL) 2 % SHAMPOO    Apply 120 mLs topically twice a week.       Start Date: 8/27/2023 End Date: --    LIOTHYRONINE (CYTOMEL) 5 MCG TAB    Take 2 tablets (10 mcg total) by mouth once daily.       Start Date: 12/8/2016 End Date: 9/21/2023    MELOXICAM (MOBIC) 15 MG TABLET    Take 15 mg by mouth daily as needed.       Start Date: 12/22/2021End Date: --    METHYLPHENIDATE HCL (RITALIN) 20 MG TABLET    Take 20 mg by mouth 2 (two) times daily.       Start Date: --        End Date: --    MINOXIDIL (LONITEN) 2.5 MG TABLET    Take 2.5 mg by mouth once daily.       Start Date: --        End Date: --    MOMETASONE-FORMOTEROL (DULERA) 200-5 MCG/ACTUATION INHALER    Inhale 2 puffs into the lungs 2 (two) times daily. Controller       Start Date: --        End Date: --    OMEGA-3 FATTY ACIDS/FISH OIL (FISH OIL-OMEGA-3 FATTY ACIDS) 300-1,000 MG CAPSULE    Take 1 capsule by mouth once daily.       Start Date: --        End Date: --    OXYBUTYNIN (DITROPAN-XL) 10 MG 24 HR TABLET    Take 10 mg by mouth once daily.       Start Date: --        End Date: --    PANTOPRAZOLE (PROTONIX) 40 MG TABLET    Take 40 mg by mouth once daily.       Start Date: --        End Date: --    PRAMOXINE 1 % FOAM    Place rectally every 10 days.       Start Date: --        End Date: --    QUETIAPINE (SEROQUEL) 200 MG TAB    Take 1 tablet (200 mg total) by mouth every evening.       Start Date: 12/8/2016 End Date: 9/21/2023    VILAZODONE (VIIBRYD) 20 MG TAB    Take 20 mg by mouth Daily.       Start Date: --        End Date: --    ZOLPIDEM (AMBIEN) 10 MG TAB    Take 10 mg by mouth nightly as needed.       Start Date: --        End Date: --          The patient's Health Maintenance was reviewed and the following appears to be due at this time:   Health Maintenance Due   Topic Date Due    Hepatitis C Screening  Never done    Shingles Vaccine (1 of 2) Never done    RSV Vaccine (Age 60+ and Pregnant patients) (1 - 1-dose 60+ series) Never done     Pneumococcal Vaccines (Age 65+) (1 - PCV) Never done         Follow up in about 6 weeks (around 1/9/2024) for Follow Up Leucopenia. 1 AW .   In addition to their scheduled follow up, the patient has also been instructed to follow up on as needed basis.     Provided patient with a 5-10 year written screening schedule and personal prevention plan. Recommendations were developed using the USPSTF age appropriate recommendations. Education, counseling, and referrals were provided as needed. After Visit Summary printed and given to patient which includes a list of additional screenings\tests needed.

## 2023-11-28 NOTE — TELEPHONE ENCOUNTER
You saw this pt today! She left a note saying her Physical therapy would like to see her more but her date ended on the 30th. She would like another referral for PT for balance she voiced. Please advise, thanks!

## 2023-11-29 RX ORDER — PRAMOXINE HYDROCHLORIDE 10 MG/G
AEROSOL, FOAM TOPICAL
Qty: 15 G | Refills: 3 | OUTPATIENT
Start: 2023-11-29

## 2023-11-29 RX ORDER — ALBUTEROL 2 MG/1
2 TABLET ORAL 3 TIMES DAILY
Qty: 90 TABLET | Refills: 3 | Status: SHIPPED | OUTPATIENT
Start: 2023-11-29 | End: 2023-11-30 | Stop reason: HOSPADM

## 2023-11-29 RX ORDER — ALBUTEROL SULFATE 90 UG/1
2 AEROSOL, METERED RESPIRATORY (INHALATION) EVERY 6 HOURS PRN
COMMUNITY
End: 2023-11-30 | Stop reason: HOSPADM

## 2023-11-30 RX ORDER — ALBUTEROL SULFATE 90 UG/1
2 AEROSOL, METERED RESPIRATORY (INHALATION) EVERY 6 HOURS PRN
Qty: 18 G | Refills: 1 | Status: SHIPPED | OUTPATIENT
Start: 2023-11-30

## 2023-11-30 RX ORDER — PRAMOXINE HYDROCHLORIDE 10 MG/G
AEROSOL, FOAM TOPICAL
OUTPATIENT
Start: 2023-11-30

## 2023-11-30 NOTE — TELEPHONE ENCOUNTER
----- Message from Shelly Bill sent at 11/30/2023  1:30 PM CST -----  .Type:  Needs Medical Advice    Who Called: pt  Symptoms (please be specific):    How long has patient had these symptoms:    Pharmacy name and phone #:  [phone] - [place] [place] - [address]    Would the patient rather a call back or a response via [facility]? Call back   Best Call Back Number: [phone]  Additional Information: pt wants to touch base with her visit with the ENT dr , and her meds that yall spoke about

## 2023-12-27 DIAGNOSIS — F32.A DEPRESSION, UNSPECIFIED DEPRESSION TYPE: ICD-10-CM

## 2023-12-27 DIAGNOSIS — E78.2 MIXED HYPERLIPIDEMIA: ICD-10-CM

## 2023-12-27 DIAGNOSIS — D72.819 LEUKOPENIA, UNSPECIFIED TYPE: ICD-10-CM

## 2023-12-27 DIAGNOSIS — E03.9 HYPOTHYROIDISM, UNSPECIFIED TYPE: ICD-10-CM

## 2023-12-27 DIAGNOSIS — E11.9 TYPE 2 DIABETES MELLITUS WITHOUT COMPLICATION, WITHOUT LONG-TERM CURRENT USE OF INSULIN: ICD-10-CM

## 2023-12-28 ENCOUNTER — TELEPHONE (OUTPATIENT)
Dept: FAMILY MEDICINE | Facility: CLINIC | Age: 63
End: 2023-12-28
Payer: MEDICARE

## 2023-12-28 NOTE — TELEPHONE ENCOUNTER
Called [facility] and asked if they can see the pt's lab orders. They voiced yes.

## 2023-12-28 NOTE — TELEPHONE ENCOUNTER
----- Message from Jose Angel Gage sent at 12/27/2023  4:21 PM CST -----  .Caller is requesting to schedule their Lab appointment prior to annual appointment.  Order is not listed in EPIC.  Please enter order and contact patient to schedule.    Name of Caller:Pt's son    Preferred Date and Time of Labs:    Date of [facility] Appointment:01/11/24 11:15am    Where would they like the lab performed? [facility]    Would the patient rather a call back or a response via [facility]? Call back    Best Call Back Number:[phone]    Additional Information:

## 2024-01-08 ENCOUNTER — TELEPHONE (OUTPATIENT)
Dept: FAMILY MEDICINE | Facility: CLINIC | Age: 64
End: 2024-01-08
Payer: MEDICARE

## 2024-01-08 NOTE — TELEPHONE ENCOUNTER
----- Message from Ursula Fox sent at 1/8/2024  3:06 PM CST -----  Type:  Patient Returning Call    Who Called:Selena    Who Left Message for Patient:    Does the patient know what this is regarding?:    Would the patient rather a call back or a response via [facility]? Call back     Best Call Back Number:[phone]    Additional Information: Selena was returning a call from office

## 2024-01-08 NOTE — TELEPHONE ENCOUNTER
Called pt and she asked if I knew what time her cardiology appointment was, I voiced no I didn't have that information. Pt also mentioned if we found her a GYN I voiced no she needs to call her insurance and she who accepts her. Pt voiced understanding and thanks.

## 2024-01-08 NOTE — TELEPHONE ENCOUNTER
----- Message from Ariel Armstrong sent at 1/8/2024  2:26 PM CST -----  .Type:  Needs Medical Advice    Who Called: Selena  Symptoms (please be specific):    How long has patient had these symptoms:    Pharmacy name and phone #:    Would the patient rather a call back or a response via [facility]?   Best Call Back Number: [phone]  Additional Information: Patient requested to speak with the nurse did not tell me what she needed.

## 2024-01-12 ENCOUNTER — APPOINTMENT (OUTPATIENT)
Dept: LAB | Facility: HOSPITAL | Age: 64
End: 2024-01-12
Attending: STUDENT IN AN ORGANIZED HEALTH CARE EDUCATION/TRAINING PROGRAM
Payer: MEDICARE

## 2024-01-12 DIAGNOSIS — L64.8 OTHER ANDROGENIC ALOPECIA: Primary | ICD-10-CM

## 2024-01-12 DIAGNOSIS — D84.89 CONGENITAL LEUKOCYTE ADHERENCE DEFICIENCY: ICD-10-CM

## 2024-01-12 LAB
BASOPHILS # BLD AUTO: 0.02 X10(3)/MCL
BASOPHILS NFR BLD AUTO: 0.5 %
EOSINOPHIL # BLD AUTO: 0.12 X10(3)/MCL (ref 0–0.9)
EOSINOPHIL NFR BLD AUTO: 2.7 %
ERYTHROCYTE [DISTWIDTH] IN BLOOD BY AUTOMATED COUNT: 12.8 % (ref 11.5–17)
FOLATE SERPL-MCNC: 7.5 NG/ML (ref 7–31.4)
HCT VFR BLD AUTO: 41.4 % (ref 37–47)
HGB BLD-MCNC: 13.7 G/DL (ref 12–16)
IMM GRANULOCYTES # BLD AUTO: 0.01 X10(3)/MCL (ref 0–0.04)
IMM GRANULOCYTES NFR BLD AUTO: 0.2 %
IRON SERPL-MCNC: 67 UG/DL (ref 50–170)
LYMPHOCYTES # BLD AUTO: 1.1 X10(3)/MCL (ref 0.6–4.6)
LYMPHOCYTES NFR BLD AUTO: 24.9 %
MAGNESIUM SERPL-MCNC: 1.9 MG/DL (ref 1.6–2.6)
MCH RBC QN AUTO: 32.2 PG (ref 27–31)
MCHC RBC AUTO-ENTMCNC: 33.1 G/DL (ref 33–36)
MCV RBC AUTO: 97.2 FL (ref 80–94)
MONOCYTES # BLD AUTO: 0.25 X10(3)/MCL (ref 0.1–1.3)
MONOCYTES NFR BLD AUTO: 5.7 %
NEUTROPHILS # BLD AUTO: 2.92 X10(3)/MCL (ref 2.1–9.2)
NEUTROPHILS NFR BLD AUTO: 66 %
NRBC BLD AUTO-RTO: 0 %
PLATELET # BLD AUTO: 267 X10(3)/MCL (ref 130–400)
PMV BLD AUTO: 10 FL (ref 7.4–10.4)
POTASSIUM SERPL-SCNC: 4.4 MMOL/L (ref 3.5–5.1)
RBC # BLD AUTO: 4.26 X10(6)/MCL (ref 4.2–5.4)
VIT B12 SERPL-MCNC: 703 PG/ML (ref 213–816)
WBC # SPEC AUTO: 4.42 X10(3)/MCL (ref 4.5–11.5)

## 2024-01-12 PROCEDURE — 84630 ASSAY OF ZINC: CPT

## 2024-01-12 PROCEDURE — 83735 ASSAY OF MAGNESIUM: CPT

## 2024-01-12 PROCEDURE — 36415 COLL VENOUS BLD VENIPUNCTURE: CPT

## 2024-01-12 PROCEDURE — 84132 ASSAY OF SERUM POTASSIUM: CPT

## 2024-01-12 PROCEDURE — 82607 VITAMIN B-12: CPT

## 2024-01-12 PROCEDURE — 82746 ASSAY OF FOLIC ACID SERUM: CPT

## 2024-01-12 PROCEDURE — 83540 ASSAY OF IRON: CPT

## 2024-01-12 PROCEDURE — 86317 IMMUNOASSAY INFECTIOUS AGENT: CPT

## 2024-01-15 LAB — ZINC SERPL-MCNC: 68 MCG/DL (ref 60–106)

## 2024-01-18 LAB
IMMUNOLOGIST REVIEW: NORMAL
S PN DA SERO 19F IGG SER-MCNC: 0.3 MCG/ML
S PNEUM DA 1 IGG SER-MCNC: 0.2 MCG/ML
S PNEUM DA 10A IGG SER-MCNC: 0.2 MCG/ML
S PNEUM DA 11A IGG SER-MCNC: <0.1 MCG/ML
S PNEUM DA 12F IGG SER-MCNC: 0.1 MCG/ML
S PNEUM DA 14 IGG SER-MCNC: 0.1 MCG/ML
S PNEUM DA 15B IGG SER-MCNC: 0.3 MCG/ML
S PNEUM DA 17F IGG SER-MCNC: 0.1 MCG/ML
S PNEUM DA 18C IGG SER-MCNC: 0.6 MCG/ML
S PNEUM DA 19A IGG SER-MCNC: 0.3 MCG/ML
S PNEUM DA 2 IGG SER-MCNC: 0.3 MCG/ML
S PNEUM DA 20A IGG SER-MCNC: 0.4 MCG/ML
S PNEUM DA 22F IGG SER-MCNC: 0.3 MCG/ML
S PNEUM DA 23F IGG SER-MCNC: 0.1 MCG/ML
S PNEUM DA 3 IGG SER-MCNC: <0.1 MCG/ML
S PNEUM DA 33F IGG SER-MCNC: 1.8 MCG/ML
S PNEUM DA 4 IGG SER-MCNC: 0.1 MCG/ML
S PNEUM DA 5 IGG SER-MCNC: <0.1 MCG/ML
S PNEUM DA 6B IGG SER-MCNC: 0.1 MCG/ML
S PNEUM DA 7F IGG SER-MCNC: 0.2 MCG/ML
S PNEUM DA 8 IGG SER-MCNC: 0.3 MCG/ML
S PNEUM DA 9N IGG SER-MCNC: 1.4 MCG/ML
S PNEUM DA 9V IGG SER-MCNC: 0.4 MCG/ML

## 2024-02-01 ENCOUNTER — TELEPHONE (OUTPATIENT)
Dept: FAMILY MEDICINE | Facility: CLINIC | Age: 64
End: 2024-02-01

## 2024-02-01 ENCOUNTER — OFFICE VISIT (OUTPATIENT)
Dept: FAMILY MEDICINE | Facility: CLINIC | Age: 64
End: 2024-02-01
Payer: MEDICARE

## 2024-02-01 VITALS
TEMPERATURE: 98 F | SYSTOLIC BLOOD PRESSURE: 124 MMHG | OXYGEN SATURATION: 96 % | BODY MASS INDEX: 30.41 KG/M2 | HEIGHT: 65 IN | RESPIRATION RATE: 17 BRPM | WEIGHT: 182.5 LBS | HEART RATE: 98 BPM | DIASTOLIC BLOOD PRESSURE: 80 MMHG

## 2024-02-01 DIAGNOSIS — E03.9 HYPOTHYROIDISM, UNSPECIFIED TYPE: ICD-10-CM

## 2024-02-01 DIAGNOSIS — M25.561 ACUTE PAIN OF RIGHT KNEE: ICD-10-CM

## 2024-02-01 DIAGNOSIS — R41.3 OTHER AMNESIA: Primary | ICD-10-CM

## 2024-02-01 DIAGNOSIS — D72.819 LEUKOPENIA, UNSPECIFIED TYPE: ICD-10-CM

## 2024-02-01 DIAGNOSIS — D84.89 OTHER IMMUNODEFICIENCIES: ICD-10-CM

## 2024-02-01 DIAGNOSIS — F33.2 SEVERE EPISODE OF RECURRENT MAJOR DEPRESSIVE DISORDER, WITHOUT PSYCHOTIC FEATURES: ICD-10-CM

## 2024-02-01 DIAGNOSIS — E11.9 TYPE 2 DIABETES MELLITUS WITHOUT COMPLICATION, WITHOUT LONG-TERM CURRENT USE OF INSULIN: ICD-10-CM

## 2024-02-01 DIAGNOSIS — E66.09 CLASS 1 OBESITY DUE TO EXCESS CALORIES WITHOUT SERIOUS COMORBIDITY WITH BODY MASS INDEX (BMI) OF 30.0 TO 30.9 IN ADULT: ICD-10-CM

## 2024-02-01 DIAGNOSIS — F03.90 DEMENTIA, UNSPECIFIED DEMENTIA SEVERITY, UNSPECIFIED DEMENTIA TYPE, UNSPECIFIED WHETHER BEHAVIORAL, PSYCHOTIC, OR MOOD DISTURBANCE OR ANXIETY: ICD-10-CM

## 2024-02-01 DIAGNOSIS — I73.9 PERIPHERAL VASCULAR DISEASE: ICD-10-CM

## 2024-02-01 PROCEDURE — 99214 OFFICE O/P EST MOD 30 MIN: CPT | Mod: ,,, | Performed by: STUDENT IN AN ORGANIZED HEALTH CARE EDUCATION/TRAINING PROGRAM

## 2024-02-01 PROCEDURE — 3288F FALL RISK ASSESSMENT DOCD: CPT | Mod: CPTII,,, | Performed by: STUDENT IN AN ORGANIZED HEALTH CARE EDUCATION/TRAINING PROGRAM

## 2024-02-01 PROCEDURE — 1101F PT FALLS ASSESS-DOCD LE1/YR: CPT | Mod: CPTII,,, | Performed by: STUDENT IN AN ORGANIZED HEALTH CARE EDUCATION/TRAINING PROGRAM

## 2024-02-01 PROCEDURE — 1125F AMNT PAIN NOTED PAIN PRSNT: CPT | Mod: CPTII,,, | Performed by: STUDENT IN AN ORGANIZED HEALTH CARE EDUCATION/TRAINING PROGRAM

## 2024-02-01 PROCEDURE — 3074F SYST BP LT 130 MM HG: CPT | Mod: CPTII,,, | Performed by: STUDENT IN AN ORGANIZED HEALTH CARE EDUCATION/TRAINING PROGRAM

## 2024-02-01 PROCEDURE — 3079F DIAST BP 80-89 MM HG: CPT | Mod: CPTII,,, | Performed by: STUDENT IN AN ORGANIZED HEALTH CARE EDUCATION/TRAINING PROGRAM

## 2024-02-01 PROCEDURE — 3008F BODY MASS INDEX DOCD: CPT | Mod: CPTII,,, | Performed by: STUDENT IN AN ORGANIZED HEALTH CARE EDUCATION/TRAINING PROGRAM

## 2024-02-01 PROCEDURE — 1159F MED LIST DOCD IN RCRD: CPT | Mod: CPTII,,, | Performed by: STUDENT IN AN ORGANIZED HEALTH CARE EDUCATION/TRAINING PROGRAM

## 2024-02-01 RX ORDER — ALBUTEROL 2 MG/1
2 TABLET ORAL 3 TIMES DAILY
COMMUNITY
Start: 2023-11-30 | End: 2024-02-16 | Stop reason: SDUPTHER

## 2024-02-01 NOTE — PROGRESS NOTES
Patient ID: [id number]     Chief Complaint: ADHD (6 WK f/u), Depression (6 WK f/u), Diabetes (6 WK f/u), Gastroesophageal Reflux (6 WK f/u), and Leukopenia (6 WK f/u)    HPI:      Disclaimer:  This note is prepared using voice recognition software and as such is likely to have errors despite attempts at proofreading. Please contact me for questions.     Selena Cunningham is a 63 y.o. female here today for the following    Over all doing good. No new concerns    Is here to discuss the following issues. And Is to establish care    Acute Issues-  Fall  Right Knee pain   Patient reports that she tripped and fell on her right knee.  She has been having pain since then.  It was bruised and swollen but it is getting better.  She has a wound on the anterior portion of the knee which is not getting better.  Denies of any dislocations, drainage, bleeding, bladder or bowel incontinence      The patient has following issues to discuss  Acute problem   Aphthous ulcer  Reports that was diagnosed of immune system disorder  By Dr. Cisneros. Is referred to Dr. Swanson    Leukopenia   Currently asymptomatic except having chronic oral answer  Reports of recently being diagnosed of autoimmune disease by Dr. Cisneros   Due for further blood work    DM type 2  Hemoglobin A1c   Date Value Ref Range Status   09/20/2023 5.2 <=7.0 % Final   08/10/2018 5.4 4.2 - 6.3 % Final   07/27/2018 5.3 4.2 - 6.3 % Final   A1c well controlled with out any meds  Protective Sensation (w/ 10 gram monofilament):  Right: Intact  Left: Intact    Visual Inspection:  Normal -  Bilateral    Pedal Pulses:   Right: Present  Left: Present    Posterior Tibialis Pulses:   Right:Present  Left: Present     Dementia-  Patient reports that she is forgetful  Does not remember when she was diagnosed with dementia   Denies of any family history of Alzheimer's disease   Last MRI demonstrated she has chronic microangiopathic changes       Major Depressive disorder  Generalized anxiety  disorder   Currently seeing Dr. Sharon Abreu  Denies suicidal/homicidal symptoms   Currently on Wellbutrin and Vibryd per her psychiatrist     ADHD (attention deficit hyperactivity disorder)  Adderall x 30 mg x bid and is stable     Family history of sudden cardiac death  Father  at age 48   ASx  Reports of having her brothers , sisters and are not having coronary artery disease at earlier age  Cards referral sent.  Patient was no-show to cards    Peripheral Vascular disease  S/p Stents b/l By Dr. Dr. Boris Dubois     Hypothyroidism   Patient reports that she is off of her medication and is asymptomatic  Chronic Issues-    ----------------------------  Aphthous ulcer  Asthma  Constipation  Depression  Dysphagia  Epigastric pain  Fatigue  Fatigue  Folate deficiency  GERD (gastroesophageal reflux disease)  Macrocytosis  Obesity, unspecified     Past Surgical History:   Procedure Laterality Date    APPENDECTOMY      CATARACT EXTRACTION Bilateral 2023    CHOLECYSTECTOMY      COLONOSCOPY N/A 2022    Procedure: COLONOSCOPY;  Surgeon: Damion Martinez MD;  Location: [facility] ENDOSCOPY;  Service: Gastroenterology;  Laterality: N/A;    HYSTERECTOMY      LAPAROSCOPIC CHOLECYSTECTOMY      NASAL SINUS SURGERY      TONSILLECTOMY         Review of patient's allergies indicates:   Allergen Reactions    Pcn [penicillins] Rash       Current Outpatient Medications   Medication Instructions    (Magic mouthwash) 1:1 Benadryl 12.5mg/5ml liq, mylanta, LIDOcaine viscous 2%, nystatin 100,000u, prednisolone 15mg/5mL, distilled water 480 mLs, Swish & Swallow, Every 4 hours PRN    albuterol (PROVENTIL/VENTOLIN HFA) 90 mcg/actuation inhaler 2 puffs, Inhalation, Every 6 hours PRN, Rescue    buPROPion (WELLBUTRIN XL) 300 mg, Oral, Daily    clonazePAM (KLONOPIN) 1 mg, Oral, 2 times daily PRN    clopidogreL (PLAVIX) 75 mg, Oral, Daily    estradioL (ESTRACE) 1 g, Vaginal, Twice weekly    finasteride (PROSCAR) 5 mg,  Oral, Daily    folic acid (FOLVITE) 1 mg, Oral, Daily    folic acid-vit B6-vit B12 2.5-25-2 mg (FOLBIC OR EQUIV) 2.5-25-2 mg Tab 1 tablet, Oral, Daily    ketoconazole (NIZORAL) 2 % shampoo 120 mLs, Topical (Top), Twice weekly    liothyronine (CYTOMEL) 10 mcg, Oral, Daily    methylphenidate HCl (RITALIN) 20 mg, Oral, 2 times daily    minoxidiL (LONITEN) 2.5 mg, Oral, Daily    mometasone-formoterol (DULERA) 200-5 mcg/actuation inhaler 2 puffs, Inhalation, 2 times daily, Controller    omega-3 fatty acids/fish oil (FISH OIL-OMEGA-3 FATTY ACIDS) 300-1,000 mg capsule 1 capsule, Oral, Daily    oxybutynin (DITROPAN-XL) 10 mg, Oral, Daily    pantoprazole (PROTONIX) 40 mg, Oral, Daily    pramoxine 1 % Foam Rectal, Every 10 days    QUEtiapine (SEROQUEL) 200 mg, Oral, Nightly    vilazodone (VIIBRYD) 20 mg, Oral, Daily    zolpidem (AMBIEN) 10 mg, Oral, Nightly PRN       Social History     Socioeconomic History    Marital status:     Number of children: 2   Tobacco Use    Smoking status: Former     Passive exposure: Past    Smokeless tobacco: Never   Substance and Sexual Activity    Alcohol use: No    Drug use: No    Sexual activity: Not Currently        Family History   Problem Relation Age of Onset    Drug abuse Brother         Patient Care Team:  Tennille Conrad MD as PCP - General (Family Medicine)     Subjective:     ROS    See HPI for details    Constitutional: Denies Change in appetite. Denies Chills. Denies Fever. Denies Night sweats.  Respiratory: Denies Cough. Denies Shortness of breath. Denies Shortness of breath with exertion. Denies Wheezing.  Cardiovascular: DeniesChest pain at rest. Denies Chest pain with exertion. Denies Irregular heartbeat. Denies Palpitations. Denies Edema.  Gastrointestinal: Denies Abdominal pain. DeniesDiarrhea. Denies Nausea. Denies Vomiting. Denies Hematemesis or Hematochezia.  Genitourinary: Denies Dysuria. Denies Urinary frequency. Denies Urinary urgency. Denies Blood in  "urine.  Endocrine: Denies Cold intolerance. Denies Excessive thirst. Denies Heat intolerance. Denies Weight loss. Denies Weight gain.  Musculoskeletal: Denies Painful joints. Denies Weakness.  Integumentary: Denies Rash. Denies Itching. Denies Dry skin.  Neurologic: Denies Dizziness. Denies Fainting. Denies Headache.  Psychiatric: Denies Depression. Denies Anxiety. Denies Suicidal/Homicidal ideations.    All Other ROS: Negative except as stated in HPI.  Objective:     Visit Vitals  /80 (BP Location: Right arm, Patient Position: Sitting, BP Method: Large (Manual))   Pulse 98   Temp 98 °F (36.7 °C) (Oral)   Resp 17   Ht 5' 5" (1.651 m)   Wt 82.8 kg (182 lb 8 oz)   SpO2 96%   BMI 30.37 kg/m²       Physical Exam    General: Alert and oriented, obese, No acute distress.  Respiratory: Clear to auscultation bilaterally; No wheezes, rales or rhonchi,Non-labored respirations, Symmetrical chest wall expansion.  Cardiovascular: Regular rate and rhythm, S1/S2 normal, No murmurs, rubs or gallops.  Gastrointestinal: Soft, Non-tender, Non-distended, Normal bowel sounds, No palpable organomegaly.  Musculoskeletal:   Musculoskeletal: R KNEE   Inspection: Normal gait; Full weight bearing; No swelling; + erythema, skin abrasion   Palpation: + tender; Crepitus Negative; Normal temperature   ROM:   Restricted  Strength:    intact  Special Tests:        Ballotable Effusion: Negative        Lachman's: ACL stable        Posterior Drawer: PCL stable        Varus Stress: LCL stable at 0 and 30 degrees        Valgus Stress: MCL stable at 0 and 30 degrees       Neurovascular: Intact, 2+ distal pulses, Sensation intact to light touch   Extremities: No clubbing, cyanosis or edema  Neurologic: No focal deficits  Psychiatric: Normal interaction, Coherent speech, Euthymic mood, Appropriate affect   Assessment:       ICD-10-CM ICD-9-CM   1. Acute pain of right knee  M25.561 719.46   2. Dementia, unspecified dementia severity, unspecified " dementia type, unspecified whether behavioral, psychotic, or mood disturbance or anxiety  F03.90 294.20   3. Hypothyroidism, unspecified type  E03.9 244.9   4. Other immunodeficiencies  D84.89 279.3   5. Peripheral vascular disease  I73.9 443.9   6. Type 2 diabetes mellitus without complication, without long-term current use of insulin  E11.9 250.00   7. Severe episode of recurrent major depressive disorder, without psychotic features  F33.2 296.33   8. Leukopenia, unspecified type  D72.819 288.50   9. Class 1 obesity due to excess calories without serious comorbidity with body mass index (BMI) of 30.0 to 30.9 in adult  E66.09 278.00    Z68.30 V85.30        Plan:     1. Acute pain of right knee  -     X-Ray Knee 1 or 2 View Right; Future; Expected date: 02/01/2024  will send physical therapy prescription after the x-ray results come    2. Dementia, unspecified dementia severity, unspecified dementia type, unspecified whether behavioral, psychotic, or mood disturbance or anxiety  - likely vascular (MRI demonstrated microangiopathic ischemia and atrophy)  -     Ambulatory referral/consult to Neurology; Future; Expected date: 02/08/2024    3. Hypothyroidism, unspecified type  -     TSH; Future; Expected date: 02/01/2024  -     T4, Free; Future; Expected date: 02/01/2024    4. Other immunodeficiencies  8. Leukopenia, unspecified type  -     Folate; Future; Expected date: 02/01/2024  -     Vitamin B12; Future; Expected date: 02/01/2024  -     Sedimentation rate; Future; Expected date: 02/01/2024  -     Path Review, Peripheral Smear; Future; Expected date: 02/01/2024  We will get the records from Dr. Cisneros office  Vitamin B12, folic acid, peripheral blood work ordered    5. Peripheral vascular disease  Keep scheduled visit with Dr. Oscar Dubois   Continue to avoid smoking   Keep goal LDL less than 70, goal A1c less than 7    6. Type 2 diabetes mellitus without complication, without long-term current use of insulin  -      Diabetic Eye Screening Photo; Future  -     Microalbumin/Creatinine Ratio, Urine  -     Hemoglobin A1C; Future; Expected date: 02/01/2024    7. Severe episode of recurrent major depressive disorder, without psychotic features  Keep scheduled follow up with Psychiatry   PHQ-9 score today is 6  Continue Rx as prescribed    9. Class 1 obesity due to excess calories without serious comorbidity with body mass index (BMI) of 30.0 to 30.9 in adult    Body mass index is 30.37 kg/m².  Goal BMI <30.  Exercise 5 times a week for 30 minutes per day.  Avoid soda, simple sugars, excessive rice, potatoes or bread. Limit fast foods and fried foods.  Choose complex carbs in moderation (example: green vegetables, beans, oatmeal). Eat plenty of fresh fruits and vegetables with lean meats daily.  Do not skip meals. Eat a balanced portion size.  Avoid fad diets. Consider permanent healthy life style changes.         Medication List with Changes/Refills   Current Medications    (MAGIC MOUTHWASH) 1:1 BENADRYL 12.5MG/5ML LIQ, MYLANTA, LIDOCAINE VISCOUS 2%, NYSTATIN 100,000U, PREDNISOLONE 15MG/5ML, DISTILLED WATER    Swish and swallow 480 mLs every 4 (four) hours as needed (pain).       Start Date: 11/29/2023End Date: --    ALBUTEROL (PROVENTIL/VENTOLIN HFA) 90 MCG/ACTUATION INHALER    Inhale 2 puffs into the lungs every 6 (six) hours as needed for Wheezing. Rescue       Start Date: 11/30/2023End Date: --    BUPROPION (WELLBUTRIN XL) 150 MG TB24 TABLET    Take 300 mg by mouth once daily.       Start Date: --        End Date: --    CLONAZEPAM (KLONOPIN) 1 MG TABLET    Take 1 mg by mouth 2 (two) times daily as needed for Anxiety.       Start Date: --        End Date: --    CLOPIDOGREL (PLAVIX) 75 MG TABLET    Take 75 mg by mouth once daily.       Start Date: --        End Date: --    ESTRADIOL (ESTRACE) 0.01 % (0.1 MG/GRAM) VAGINAL CREAM    Place 1 g vaginally twice a week.       Start Date: 8/28/2023 End Date: --    FINASTERIDE (PROSCAR) 5  MG TABLET    Take 5 mg by mouth once daily.       Start Date: --        End Date: --    FOLIC ACID (FOLVITE) 1 MG TABLET    Take 1 mg by mouth once daily.       Start Date: --        End Date: --    FOLIC ACID-VIT B6-VIT B12 2.5-25-2 MG (FOLBIC OR EQUIV) 2.5-25-2 MG TAB    Take 1 tablet by mouth once daily.       Start Date: 12/8/2016 End Date: --    KETOCONAZOLE (NIZORAL) 2 % SHAMPOO    Apply 120 mLs topically twice a week.       Start Date: 8/27/2023 End Date: --    LIOTHYRONINE (CYTOMEL) 5 MCG TAB    Take 2 tablets (10 mcg total) by mouth once daily.       Start Date: 12/8/2016 End Date: 9/21/2023    METHYLPHENIDATE HCL (RITALIN) 20 MG TABLET    Take 20 mg by mouth 2 (two) times daily.       Start Date: --        End Date: --    MINOXIDIL (LONITEN) 2.5 MG TABLET    Take 2.5 mg by mouth once daily.       Start Date: --        End Date: --    MOMETASONE-FORMOTEROL (DULERA) 200-5 MCG/ACTUATION INHALER    Inhale 2 puffs into the lungs 2 (two) times daily. Controller       Start Date: --        End Date: --    OMEGA-3 FATTY ACIDS/FISH OIL (FISH OIL-OMEGA-3 FATTY ACIDS) 300-1,000 MG CAPSULE    Take 1 capsule by mouth once daily.       Start Date: --        End Date: --    OXYBUTYNIN (DITROPAN-XL) 10 MG 24 HR TABLET    Take 10 mg by mouth once daily.       Start Date: --        End Date: --    PANTOPRAZOLE (PROTONIX) 40 MG TABLET    Take 40 mg by mouth once daily.       Start Date: --        End Date: --    PRAMOXINE 1 % FOAM    Place rectally every 10 days.       Start Date: --        End Date: --    QUETIAPINE (SEROQUEL) 200 MG TAB    Take 1 tablet (200 mg total) by mouth every evening.       Start Date: 12/8/2016 End Date: 9/21/2023    VILAZODONE (VIIBRYD) 20 MG TAB    Take 20 mg by mouth Daily.       Start Date: --        End Date: --    ZOLPIDEM (AMBIEN) 10 MG TAB    Take 10 mg by mouth nightly as needed.       Start Date: --        End Date: --          Follow up in about 2 weeks (around 2/15/2024) for oral ulcers  virtual visit .   In addition to their scheduled follow up, the patient has also been instructed to follow up on as needed basis.

## 2024-02-01 NOTE — TELEPHONE ENCOUNTER
----- Message from Tennille Conrad MD sent at 2/1/2024 10:03 AM CST -----  Please get the last clinic note from Dr. Cisneros's Office.

## 2024-02-16 RX ORDER — ALBUTEROL 2 MG/1
2 TABLET ORAL 3 TIMES DAILY
Qty: 30 TABLET | Refills: 3 | Status: SHIPPED | OUTPATIENT
Start: 2024-02-16 | End: 2024-04-24

## 2024-02-16 NOTE — TELEPHONE ENCOUNTER
----- Message from Sujata Acevedo sent at 2/16/2024  8:26 AM CST -----  Regarding: test results  Type:  RX Refill Request    Who Called: pt  Refill or New Rx:refill  RX Name and Strength:albuterol (PROVENTIL) 2 MG Tab  How is the patient currently taking it? (ex. 1XDay):1 day  Is this a 30 day or 90 day RX:  Preferred Pharmacy with phone number:[facility] in [place]  Local or Mail Order:local  Ordering Provider:dr cobb  Would the patient rather a call back or a response via [facility]? C/b  Best Call Back Number:[phone]  Additional Information:

## 2024-02-23 ENCOUNTER — HOSPITAL ENCOUNTER (OUTPATIENT)
Dept: RADIOLOGY | Facility: HOSPITAL | Age: 64
Discharge: HOME | End: 2024-02-23
Attending: STUDENT IN AN ORGANIZED HEALTH CARE EDUCATION/TRAINING PROGRAM
Payer: MEDICARE

## 2024-02-23 DIAGNOSIS — M25.561 ACUTE PAIN OF RIGHT KNEE: ICD-10-CM

## 2024-02-23 PROCEDURE — 73560 X-RAY EXAM OF KNEE 1 OR 2: CPT | Mod: TC,RT

## 2024-02-26 DIAGNOSIS — M25.561 ACUTE PAIN OF RIGHT KNEE: Primary | ICD-10-CM

## 2024-02-27 ENCOUNTER — TELEPHONE (OUTPATIENT)
Dept: FAMILY MEDICINE | Facility: CLINIC | Age: 64
End: 2024-02-27

## 2024-02-27 ENCOUNTER — OFFICE VISIT (OUTPATIENT)
Dept: FAMILY MEDICINE | Facility: CLINIC | Age: 64
End: 2024-02-27
Payer: MEDICARE

## 2024-02-27 VITALS
OXYGEN SATURATION: 96 % | RESPIRATION RATE: 17 BRPM | TEMPERATURE: 98 F | SYSTOLIC BLOOD PRESSURE: 122 MMHG | WEIGHT: 182.5 LBS | HEART RATE: 87 BPM | BODY MASS INDEX: 30.41 KG/M2 | HEIGHT: 65 IN | DIASTOLIC BLOOD PRESSURE: 80 MMHG

## 2024-02-27 DIAGNOSIS — R73.03 PREDIABETES: ICD-10-CM

## 2024-02-27 DIAGNOSIS — R29.6 FREQUENT FALLS: ICD-10-CM

## 2024-02-27 DIAGNOSIS — E66.09 CLASS 1 OBESITY DUE TO EXCESS CALORIES WITHOUT SERIOUS COMORBIDITY WITH BODY MASS INDEX (BMI) OF 30.0 TO 30.9 IN ADULT: ICD-10-CM

## 2024-02-27 DIAGNOSIS — R52 PAIN: Primary | ICD-10-CM

## 2024-02-27 DIAGNOSIS — E78.2 MIXED HYPERLIPIDEMIA: ICD-10-CM

## 2024-02-27 PROBLEM — E11.9 DIABETES MELLITUS: Status: RESOLVED | Noted: 2023-11-28 | Resolved: 2024-02-27

## 2024-02-27 PROCEDURE — 3079F DIAST BP 80-89 MM HG: CPT | Mod: CPTII,,, | Performed by: STUDENT IN AN ORGANIZED HEALTH CARE EDUCATION/TRAINING PROGRAM

## 2024-02-27 PROCEDURE — 1160F RVW MEDS BY RX/DR IN RCRD: CPT | Mod: CPTII,,, | Performed by: STUDENT IN AN ORGANIZED HEALTH CARE EDUCATION/TRAINING PROGRAM

## 2024-02-27 PROCEDURE — 99214 OFFICE O/P EST MOD 30 MIN: CPT | Mod: ,,, | Performed by: STUDENT IN AN ORGANIZED HEALTH CARE EDUCATION/TRAINING PROGRAM

## 2024-02-27 PROCEDURE — 1101F PT FALLS ASSESS-DOCD LE1/YR: CPT | Mod: CPTII,,, | Performed by: STUDENT IN AN ORGANIZED HEALTH CARE EDUCATION/TRAINING PROGRAM

## 2024-02-27 PROCEDURE — 1126F AMNT PAIN NOTED NONE PRSNT: CPT | Mod: CPTII,,, | Performed by: STUDENT IN AN ORGANIZED HEALTH CARE EDUCATION/TRAINING PROGRAM

## 2024-02-27 PROCEDURE — 3044F HG A1C LEVEL LT 7.0%: CPT | Mod: CPTII,,, | Performed by: STUDENT IN AN ORGANIZED HEALTH CARE EDUCATION/TRAINING PROGRAM

## 2024-02-27 PROCEDURE — 3288F FALL RISK ASSESSMENT DOCD: CPT | Mod: CPTII,,, | Performed by: STUDENT IN AN ORGANIZED HEALTH CARE EDUCATION/TRAINING PROGRAM

## 2024-02-27 PROCEDURE — 1159F MED LIST DOCD IN RCRD: CPT | Mod: CPTII,,, | Performed by: STUDENT IN AN ORGANIZED HEALTH CARE EDUCATION/TRAINING PROGRAM

## 2024-02-27 PROCEDURE — 3008F BODY MASS INDEX DOCD: CPT | Mod: CPTII,,, | Performed by: STUDENT IN AN ORGANIZED HEALTH CARE EDUCATION/TRAINING PROGRAM

## 2024-02-27 PROCEDURE — 3074F SYST BP LT 130 MM HG: CPT | Mod: CPTII,,, | Performed by: STUDENT IN AN ORGANIZED HEALTH CARE EDUCATION/TRAINING PROGRAM

## 2024-02-27 RX ORDER — ATORVASTATIN CALCIUM 20 MG/1
20 TABLET, FILM COATED ORAL DAILY
Qty: 90 TABLET | Refills: 3 | Status: SHIPPED | OUTPATIENT
Start: 2024-02-27 | End: 2025-02-26

## 2024-02-27 NOTE — PROGRESS NOTES
Patient ID: [id number]     Chief Complaint: Mouth Lesions (2 WK f/u)      HPI:      Disclaimer:  This note is prepared using voice recognition software and as such is likely to have errors despite attempts at proofreading. Please contact me for questions.     Selena Cunningham is a 63 y.o. female here today for the following        Acute Issues-  Memory Change-  Blood work unremarkable.  Patient reports that memory change has been stable.  Reports of having 3-4 falls in 3-4 months.  Reports that she has a imbalance.  Most recent MRI was not remarkable except-  Chronic microangiopathic ischemia and atrophy.  Not on statins.  Denies of any confusion, blurry visions, nausea, vomiting, dizziness, loss of consciousness, vertigo, tinnitus, bowel bladder incontinence, change in gait.  Last fall was almost a month ago when she tripped and hurt her foot and right hand.  Continues to have mild pain but is able to do her daily life activities.  Denies of any swelling or breach of skin.  Denies of hitting her head or losing consciousness    DM-  Hemoglobin A1c   Date Value Ref Range Status   02/23/2024 5.5 <=7.0 % Final   09/20/2023 5.2 <=7.0 % Final   08/10/2018 5.4 4.2 - 6.3 % Final   DM eye exam pending today        Chronic Issues-    ----------------------------  Aphthous ulcer  Asthma  Constipation  Depression  Dysphagia  Epigastric pain  Fatigue  Fatigue  Folate deficiency  GERD (gastroesophageal reflux disease)  Macrocytosis  Obesity, unspecified     Past Surgical History:   Procedure Laterality Date    APPENDECTOMY      CATARACT EXTRACTION Bilateral 04/2023    CHOLECYSTECTOMY  2023    COLONOSCOPY N/A 05/11/2022    Procedure: COLONOSCOPY;  Surgeon: Damion Martinez MD;  Location: [facility] ENDOSCOPY;  Service: Gastroenterology;  Laterality: N/A;    HYSTERECTOMY      LAPAROSCOPIC CHOLECYSTECTOMY      NASAL SINUS SURGERY      TONSILLECTOMY         Review of patient's allergies indicates:   Allergen Reactions    Pcn  [penicillins] Rash       Current Outpatient Medications   Medication Instructions    (Magic mouthwash) 1:1 Benadryl 12.5mg/5ml liq, mylanta, LIDOcaine viscous 2%, nystatin 100,000u, prednisolone 15mg/5mL, distilled water 480 mLs, Swish & Swallow, Every 4 hours PRN    albuterol (PROVENTIL) 2 mg, Oral, 3 times daily    albuterol (PROVENTIL/VENTOLIN HFA) 90 mcg/actuation inhaler 2 puffs, Inhalation, Every 6 hours PRN, Rescue    buPROPion (WELLBUTRIN XL) 300 mg, Oral, Daily    clonazePAM (KLONOPIN) 1 mg, Oral, 2 times daily PRN    clopidogreL (PLAVIX) 75 mg, Oral, Daily    estradioL (ESTRACE) 1 g, Vaginal, Twice weekly    finasteride (PROSCAR) 5 mg, Oral, Daily    folic acid (FOLVITE) 1 mg, Oral, Daily    folic acid-vit B6-vit B12 2.5-25-2 mg (FOLBIC OR EQUIV) 2.5-25-2 mg Tab 1 tablet, Oral, Daily    ketoconazole (NIZORAL) 2 % shampoo 120 mLs, Topical (Top), Twice weekly    liothyronine (CYTOMEL) 10 mcg, Oral, Daily    methylphenidate HCl (RITALIN) 20 mg, Oral, 2 times daily    minoxidiL (LONITEN) 2.5 mg, Oral, Daily    mometasone-formoterol (DULERA) 200-5 mcg/actuation inhaler 2 puffs, Inhalation, 2 times daily, Controller    omega-3 fatty acids/fish oil (FISH OIL-OMEGA-3 FATTY ACIDS) 300-1,000 mg capsule 1 capsule, Oral, Daily    oxybutynin (DITROPAN-XL) 10 mg, Oral, Daily    pantoprazole (PROTONIX) 40 mg, Oral, Daily    pramoxine 1 % Foam Rectal, Every 10 days    QUEtiapine (SEROQUEL) 200 mg, Oral, Nightly    vilazodone (VIIBRYD) 20 mg, Oral, Daily    zolpidem (AMBIEN) 10 mg, Oral, Nightly PRN       Social History     Socioeconomic History    Marital status:     Number of children: 2   Tobacco Use    Smoking status: Former     Passive exposure: Past    Smokeless tobacco: Never   Substance and Sexual Activity    Alcohol use: No    Drug use: No    Sexual activity: Not Currently        Family History   Problem Relation Age of Onset    Drug abuse Brother         Patient Care Team:  Tennille Conrad MD as PCP -  "General (Family Medicine)     Subjective:     ROS    See HPI for details    Constitutional: Denies Change in appetite. Denies Chills. Denies Fever. Denies Night sweats.  Respiratory: Denies Cough. Denies Shortness of breath. Denies Shortness of breath with exertion. Denies Wheezing.  Cardiovascular: DeniesChest pain at rest. Denies Chest pain with exertion. Denies Irregular heartbeat. Denies Palpitations. Denies Edema.  Gastrointestinal: Denies Abdominal pain. DeniesDiarrhea. Denies Nausea. Denies Vomiting. Denies Hematemesis or Hematochezia.  Genitourinary: Denies Dysuria. Denies Urinary frequency. Denies Urinary urgency. Denies Blood in urine.  Endocrine: Denies Cold intolerance. Denies Excessive thirst. Denies Heat intolerance. Denies Weight loss. Denies Weight gain.  Musculoskeletal: Denies Painful joints. Denies Weakness.  Integumentary: Denies Rash. Denies Itching. Denies Dry skin.  Neurologic: Denies Dizziness. Denies Fainting. Denies Headache.  Psychiatric: Denies Depression. Denies Anxiety. Denies Suicidal/Homicidal ideations.    All Other ROS: Negative except as stated in HPI.  Objective:     Visit Vitals  /80 (BP Location: Left arm, Patient Position: Sitting, BP Method: Large (Manual))   Pulse 87   Temp 98.1 °F (36.7 °C) (Oral)   Resp 17   Ht 5' 5" (1.651 m)   Wt 82.8 kg (182 lb 8 oz)   SpO2 96%   BMI 30.37 kg/m²       Physical Exam    General: Alert and oriented, Obese,  No acute distress.  Respiratory: Clear to auscultation bilaterally; No wheezes, rales or rhonchi,Non-labored respirations, Symmetrical chest wall expansion.  Cardiovascular: Regular rate and rhythm, S1/S2 normal, No murmurs, rubs or gallops.  Gastrointestinal: Soft, Non-tender, Non-distended, Normal bowel sounds, No palpable organomegaly.  Musculoskeletal:   X-ray hand right  No acute swelling, redness or breach of skin seen   Palpation-no tenderness    X-ray right foot  No acute swelling/redness/breach of skin seen  Pulses felt " bilaterally  Palpation-no tenderness  Extremities: No clubbing, cyanosis or edema  Neurologic: No focal deficits  Psychiatric: Normal interaction, Coherent speech, Euthymic mood, Appropriate affect   Assessment:       ICD-10-CM ICD-9-CM   1. Frequent falls  R29.6 V15.88   2. Mixed hyperlipidemia  E78.2 272.2   3. Prediabetes  R73.03 790.29   4. Class 1 obesity due to excess calories without serious comorbidity with body mass index (BMI) of 30.0 to 30.9 in adult  E66.09 278.00    Z68.30 V85.30        Plan:     1. Frequent falls  -     Ambulatory referral/consult to Neurology; Future; Expected date: 03/05/2024  -     Ambulatory referral/consult to Physical/Occupational Therapy; Future; Expected date: 03/05/2024  XR hand and foot ordered    2. Mixed hyperlipidemia  -     atorvastatin (LIPITOR) 20 MG tablet; Take 1 tablet (20 mg total) by mouth once daily.  Dispense: 90 tablet; Refill: 3    3. Prediabetes  -     Diabetic Eye Screening Photo; Future  -     Ambulatory referral/consult to Ophthalmology; Future; Expected date: 03/05/2024    4. Class 1 obesity due to excess calories without serious comorbidity with body mass index (BMI) of 30.0 to 30.9 in adult  Body mass index is 30.37 kg/m².  Goal BMI <30.  Exercise 5 times a week for 30 minutes per day.  Avoid soda, simple sugars, excessive rice, potatoes or bread. Limit fast foods and fried foods.  Choose complex carbs in moderation (example: green vegetables, beans, oatmeal). Eat plenty of fresh fruits and vegetables with lean meats daily.  Do not skip meals. Eat a balanced portion size.  Avoid fad diets. Consider permanent healthy life style changes.            Medication List with Changes/Refills   Current Medications    (MAGIC MOUTHWASH) 1:1 BENADRYL 12.5MG/5ML LIQ, MYLANTA, LIDOCAINE VISCOUS 2%, NYSTATIN 100,000U, PREDNISOLONE 15MG/5ML, DISTILLED WATER    Swish and swallow 480 mLs every 4 (four) hours as needed (pain).       Start Date: 11/29/2023End Date: --     ALBUTEROL (PROVENTIL) 2 MG TAB    Take 1 tablet (2 mg total) by mouth 3 (three) times daily.       Start Date: 2/16/2024 End Date: --    ALBUTEROL (PROVENTIL/VENTOLIN HFA) 90 MCG/ACTUATION INHALER    Inhale 2 puffs into the lungs every 6 (six) hours as needed for Wheezing. Rescue       Start Date: 11/30/2023End Date: --    BUPROPION (WELLBUTRIN XL) 150 MG TB24 TABLET    Take 300 mg by mouth once daily.       Start Date: --        End Date: --    CLONAZEPAM (KLONOPIN) 1 MG TABLET    Take 1 mg by mouth 2 (two) times daily as needed for Anxiety.       Start Date: --        End Date: --    CLOPIDOGREL (PLAVIX) 75 MG TABLET    Take 75 mg by mouth once daily.       Start Date: --        End Date: --    ESTRADIOL (ESTRACE) 0.01 % (0.1 MG/GRAM) VAGINAL CREAM    Place 1 g vaginally twice a week.       Start Date: 8/28/2023 End Date: --    FINASTERIDE (PROSCAR) 5 MG TABLET    Take 5 mg by mouth once daily.       Start Date: --        End Date: --    FOLIC ACID (FOLVITE) 1 MG TABLET    Take 1 mg by mouth once daily.       Start Date: --        End Date: --    FOLIC ACID-VIT B6-VIT B12 2.5-25-2 MG (FOLBIC OR EQUIV) 2.5-25-2 MG TAB    Take 1 tablet by mouth once daily.       Start Date: 12/8/2016 End Date: --    KETOCONAZOLE (NIZORAL) 2 % SHAMPOO    Apply 120 mLs topically twice a week.       Start Date: 8/27/2023 End Date: --    LIOTHYRONINE (CYTOMEL) 5 MCG TAB    Take 2 tablets (10 mcg total) by mouth once daily.       Start Date: 12/8/2016 End Date: 9/21/2023    METHYLPHENIDATE HCL (RITALIN) 20 MG TABLET    Take 20 mg by mouth 2 (two) times daily.       Start Date: --        End Date: --    MINOXIDIL (LONITEN) 2.5 MG TABLET    Take 2.5 mg by mouth once daily.       Start Date: --        End Date: --    MOMETASONE-FORMOTEROL (DULERA) 200-5 MCG/ACTUATION INHALER    Inhale 2 puffs into the lungs 2 (two) times daily. Controller       Start Date: --        End Date: --    OMEGA-3 FATTY ACIDS/FISH OIL (FISH OIL-OMEGA-3 FATTY ACIDS)  300-1,000 MG CAPSULE    Take 1 capsule by mouth once daily.       Start Date: --        End Date: --    OXYBUTYNIN (DITROPAN-XL) 10 MG 24 HR TABLET    Take 10 mg by mouth once daily.       Start Date: --        End Date: --    PANTOPRAZOLE (PROTONIX) 40 MG TABLET    Take 40 mg by mouth once daily.       Start Date: --        End Date: --    PRAMOXINE 1 % FOAM    Place rectally every 10 days.       Start Date: --        End Date: --    QUETIAPINE (SEROQUEL) 200 MG TAB    Take 1 tablet (200 mg total) by mouth every evening.       Start Date: 12/8/2016 End Date: 9/21/2023    VILAZODONE (VIIBRYD) 20 MG TAB    Take 20 mg by mouth Daily.       Start Date: --        End Date: --    ZOLPIDEM (AMBIEN) 10 MG TAB    Take 10 mg by mouth nightly as needed.       Start Date: --        End Date: --          Follow up in about 6 months (around 8/27/2024) for DM .   In addition to their scheduled follow up, the patient has also been instructed to follow up on as needed basis.

## 2024-02-27 NOTE — TELEPHONE ENCOUNTER
----- Message from Shelly Bill sent at 2/23/2024  9:35 AM CST -----  .Type:  Needs Medical Advice    Who Called: pt  Symptoms (please be specific):    How long has patient had these symptoms:    Pharmacy name and phone #:    Would the patient rather a call back or a response via [facility]? Call back   Best Call Back Number: [phone]  Additional Information: pt fell again and is at [facility] now want x-ray of her right foot and right hand since she's there already

## 2024-02-27 NOTE — TELEPHONE ENCOUNTER
----- Message from Tennille Conrad MD sent at 2/26/2024  4:45 PM CST -----  Encouraged the patient to stick to the appointment.  We will discuss in detail with the patient during the clinic visit    Thanks,    Dr. Conrad

## 2024-02-27 NOTE — TELEPHONE ENCOUNTER
----- Message from Tennille Conrad MD sent at 2/26/2024  1:26 PM CST -----  Please inform patient of XR results.    1. No acute osseous abnormality.    2. Referral to PT sent    3. Other labwork within acceptable ranges.    Thanks,    Dr. Conrad

## 2024-04-09 ENCOUNTER — OFFICE VISIT (OUTPATIENT)
Dept: FAMILY MEDICINE | Facility: CLINIC | Age: 64
End: 2024-04-09
Payer: MEDICARE

## 2024-04-09 VITALS
DIASTOLIC BLOOD PRESSURE: 82 MMHG | WEIGHT: 180 LBS | HEIGHT: 65 IN | HEART RATE: 88 BPM | BODY MASS INDEX: 29.99 KG/M2 | TEMPERATURE: 98 F | SYSTOLIC BLOOD PRESSURE: 120 MMHG | OXYGEN SATURATION: 98 %

## 2024-04-09 DIAGNOSIS — M25.561 ACUTE PAIN OF BOTH KNEES: ICD-10-CM

## 2024-04-09 DIAGNOSIS — M79.641 RIGHT HAND PAIN: ICD-10-CM

## 2024-04-09 DIAGNOSIS — M25.562 ACUTE PAIN OF BOTH KNEES: ICD-10-CM

## 2024-04-09 DIAGNOSIS — M79.604 RIGHT LEG PAIN: ICD-10-CM
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
## 2024-04-09 PROBLEM — F90.9 ADHD (ATTENTION DEFICIT HYPERACTIVITY DISORDER): Status: ACTIVE | Noted: 2024-04-09

## 2024-04-09 PROCEDURE — 3044F HG A1C LEVEL LT 7.0%: CPT | Mod: CPTII,,, | Performed by: STUDENT IN AN ORGANIZED HEALTH CARE EDUCATION/TRAINING PROGRAM

## 2024-04-09 PROCEDURE — 1159F MED LIST DOCD IN RCRD: CPT | Mod: CPTII,,, | Performed by: STUDENT IN AN ORGANIZED HEALTH CARE EDUCATION/TRAINING PROGRAM

## 2024-04-09 PROCEDURE — 3079F DIAST BP 80-89 MM HG: CPT | Mod: CPTII,,, | Performed by: STUDENT IN AN ORGANIZED HEALTH CARE EDUCATION/TRAINING PROGRAM

## 2024-04-09 PROCEDURE — 99213 OFFICE O/P EST LOW 20 MIN: CPT | Mod: ,,, | Performed by: STUDENT IN AN ORGANIZED HEALTH CARE EDUCATION/TRAINING PROGRAM

## 2024-04-09 PROCEDURE — 1160F RVW MEDS BY RX/DR IN RCRD: CPT | Mod: CPTII,,, | Performed by: STUDENT IN AN ORGANIZED HEALTH CARE EDUCATION/TRAINING PROGRAM

## 2024-04-09 PROCEDURE — 3008F BODY MASS INDEX DOCD: CPT | Mod: CPTII,,, | Performed by: STUDENT IN AN ORGANIZED HEALTH CARE EDUCATION/TRAINING PROGRAM

## 2024-04-09 PROCEDURE — 1100F PTFALLS ASSESS-DOCD GE2>/YR: CPT | Mod: CPTII,,, | Performed by: STUDENT IN AN ORGANIZED HEALTH CARE EDUCATION/TRAINING PROGRAM

## 2024-04-09 PROCEDURE — 3074F SYST BP LT 130 MM HG: CPT | Mod: CPTII,,, | Performed by: STUDENT IN AN ORGANIZED HEALTH CARE EDUCATION/TRAINING PROGRAM

## 2024-04-09 PROCEDURE — 3288F FALL RISK ASSESSMENT DOCD: CPT | Mod: CPTII,,, | Performed by: STUDENT IN AN ORGANIZED HEALTH CARE EDUCATION/TRAINING PROGRAM

## 2024-04-09 RX ORDER — LISDEXAMFETAMINE DIMESYLATE 70 MG/1
70 CAPSULE ORAL EVERY MORNING
COMMUNITY
Start: 2024-03-28 | End: 2024-04-24 | Stop reason: SDUPTHER

## 2024-04-09 RX ORDER — BUPROPION HYDROCHLORIDE 300 MG/1
300 TABLET ORAL DAILY
COMMUNITY
End: 2024-04-24 | Stop reason: SDUPTHER

## 2024-04-09 NOTE — PROGRESS NOTES
Patient ID: [id number]     Chief Complaint: No chief complaint on file.        HPI:      Disclaimer:  This note is prepared using voice recognition software and as such is likely to have errors despite attempts at proofreading. Please contact me for questions.     Selena Cunningham is a 63 y.o. female here today for the following      Acute Issues-  S/p fall almost a week ago when her shoe caught up in the rug.   Fall  When did the accident happen- almost a week ago   How did it happen- Shoe caught up in the rug  What very you doing when the accident happened- walking in the house  Distance fallen: Ground level, Landed on vinyl floor  Pain location- home  Pain - b/l knee ( R> L) and R hand, hurts when touching. No radiation  Pain level- 2/10 when not active  Pain quality - dull achy  Ambulatory-yes or no   Entrapment after the fall- yes   Drug involved in the accident-No   Under the effect of alcohol-No  Associated symptoms-  Pertinent negatives - include headaches, nausea, vomiting, loss of consciousness, blurry vision, no neck pain, no fever, no abdominal pain, change in gait, bowel or bladder incontinence.      Would like to get a referral to breast Center  and ophthalmology in [place].  She will let us know after she confirms with her insurance    Chronic Issues-    ----------------------------  Aphthous ulcer  Asthma  Constipation  Depression  Dysphagia  Epigastric pain  Fatigue  Fatigue  Folate deficiency  GERD (gastroesophageal reflux disease)  Macrocytosis  Obesity, unspecified     Past Surgical History:   Procedure Laterality Date    APPENDECTOMY      CATARACT EXTRACTION Bilateral 04/2023    CHOLECYSTECTOMY  2023    COLONOSCOPY N/A 05/11/2022    Procedure: COLONOSCOPY;  Surgeon: Damion Martinez MD;  Location: [facility] ENDOSCOPY;  Service: Gastroenterology;  Laterality: N/A;    HYSTERECTOMY      LAPAROSCOPIC CHOLECYSTECTOMY      NASAL SINUS SURGERY      TONSILLECTOMY         Review of patient's  allergies indicates:   Allergen Reactions    Pcn [penicillins] Rash       Current Outpatient Medications   Medication Instructions    (Magic mouthwash) 1:1 Benadryl 12.5mg/5ml liq, mylanta, LIDOcaine viscous 2%, nystatin 100,000u, prednisolone 15mg/5mL, distilled water 480 mLs, Swish & Swallow, Every 4 hours PRN    albuterol (PROVENTIL) 2 mg, Oral, 3 times daily    albuterol (PROVENTIL/VENTOLIN HFA) 90 mcg/actuation inhaler 2 puffs, Inhalation, Every 6 hours PRN, Rescue    atorvastatin (LIPITOR) 20 mg, Oral, Daily    buPROPion (WELLBUTRIN XL) 300 mg, Oral, Daily    clonazePAM (KLONOPIN) 1 mg, Oral, 2 times daily PRN    clopidogreL (PLAVIX) 75 mg, Oral, Daily    estradioL (ESTRACE) 1 g, Vaginal, Twice weekly    finasteride (PROSCAR) 5 mg, Oral, Daily    folic acid (FOLVITE) 1 mg, Oral, Daily    folic acid-vit B6-vit B12 2.5-25-2 mg (FOLBIC OR EQUIV) 2.5-25-2 mg Tab 1 tablet, Oral, Daily    ketoconazole (NIZORAL) 2 % shampoo 120 mLs, Topical (Top), Twice weekly    liothyronine (CYTOMEL) 10 mcg, Oral, Daily    methylphenidate HCl (RITALIN) 20 mg, Oral, 2 times daily    minoxidiL (LONITEN) 2.5 mg, Oral, Daily    mometasone-formoterol (DULERA) 200-5 mcg/actuation inhaler 2 puffs, Inhalation, 2 times daily, Controller    omega-3 fatty acids/fish oil (FISH OIL-OMEGA-3 FATTY ACIDS) 300-1,000 mg capsule 1 capsule, Oral, Daily    oxybutynin (DITROPAN-XL) 10 mg, Oral, Daily    pantoprazole (PROTONIX) 40 mg, Oral, Daily    pramoxine 1 % Foam Rectal, Every 10 days    QUEtiapine (SEROQUEL) 200 mg, Oral, Nightly    vilazodone (VIIBRYD) 20 mg, Oral, Daily    zolpidem (AMBIEN) 10 mg, Oral, Nightly PRN       Social History     Socioeconomic History    Marital status:     Number of children: 2   Tobacco Use    Smoking status: Former     Passive exposure: Past    Smokeless tobacco: Never   Substance and Sexual Activity    Alcohol use: No    Drug use: No    Sexual activity: Not Currently        Family History   Problem Relation  "Age of Onset    Drug abuse Brother         Patient Care Team:  Tennille Conrad MD as PCP - General (Family Medicine)     Subjective:     ROS    See HPI for details    Constitutional: Denies Change in appetite. Denies Chills. Denies Fever. Denies Night sweats.  Respiratory: Denies Cough. Denies Shortness of breath. Denies Shortness of breath with exertion. Denies Wheezing.  Cardiovascular: DeniesChest pain at rest. Denies Chest pain with exertion. Denies Irregular heartbeat. Denies Palpitations. Denies Edema.  MSK-as HPI    All Other ROS: Negative except as stated in HPI.  Objective:     Visit Vitals  /82   Pulse 88   Temp 97.6 °F (36.4 °C) (Temporal)   Ht 5' 5" (1.651 m)   Wt 81.6 kg (180 lb)   SpO2 98%   BMI 29.95 kg/m²       Physical Exam    General: Alert and oriented, No acute distress.  Respiratory: Clear to auscultation bilaterally; No wheezes, rales or rhonchi,Non-labored respirations, Symmetrical chest wall expansion.  Cardiovascular: Regular rate and rhythm, S1/S2 normal, No murmurs, rubs or gallops.  Gastrointestinal: Soft, Non-tender, Non-distended, Normal bowel sounds, No palpable organomegaly.  Musculoskeletal:     Musculoskeletal: KNEE   Inspection: Normal gait; Full weight bearing; + swelling R LE; +erythema RLE, +contusion  Palpation: + tender b/l knee; Crepitus Negative; Normal temperature   ROM:        Active Flexion/Extension: (0-140) restricted  Strength:        Flexion:  5/5        Extension:  5/5   Special Tests:        Ballotable Effusion: Negative        Lachman's: ACL stable        Posterior Drawer: PCL stable        Varus Stress: LCL stable at 0 and 30 degrees        Valgus Stress: MCL stable at 0 and 30 degrees        Patellar Grind: Negative        Pablo: Negative        Apley's Compression: Negative   Neurovascular: Intact, 2+ distal pulses, Sensation intact to light touch     Assessment:       ICD-10-CM ICD-9-CM   1. Right leg pain  M79.604 729.5   2. Fall, initial " encounter  W19.XXXA E888.9   3. Acute pain of both knees  M25.561 338.19    M25.562 719.46   4. Right hand pain  M79.641 729.5        Plan:     1. Right leg pain  -     X-Ray Femur 2 View Right; Future; Expected date: 04/09/2024    2. Fall, initial encounter      9/20/2023     8:00 AM 9/19/2023    11:27 PM   Fall Risk Assessment   History Of Fall (W/I 3 Mos) 4-->Yes 4-->Yes   Polypharmacy 3-->Yes 3-->Yes   Central Nervous System/Psychotropic Medication 3-->Yes 3-->Yes   Cardiovascular Medication 0-->No 0-->No   Age Greater Than 65 Years 2-->Yes 2-->Yes   Altered Elimination 0-->No 0-->No   Cognitive Deficit 0-->No 0-->No   Sensory Deficit 0-->No 0-->No   Dizziness/Vertigo 0-->No 0-->No   Depression 2-->Yes 2-->Yes   Mobility Deficit/Weakness 2-->Yes 2-->Yes   Male 0-->No 0-->No   Fall Risk Score 16 16      I did complete a risk assessment for falls. A plan of care for falls was documented   As below    Physical therapy prescription provided   Medications reviewed-patient is on Ambien 10 mg, and multiple psych meds.  Recommend the patient to talk to the psychiatrist to modify the dosage of the medications  Recommend to see ophthalmologist/optometrist for vision  No postural hypotension  Declined for EKG  Recommend to take over-the-counter vitamin-D supplementations 2000 IU daily  Recommend to wear appropriate footwear (proper fitting with good sole)   Modified home environment like appropriate lighting, avoiding lose items in the walkway, strings or cords  Also gave information about alarm system.    3. Acute pain of both knees  -     X-Ray Knee AP Standing Bilateral    4. Right hand pain  -     X-Ray Hand 3 view Right; Future; Expected date: 04/09/2024  -     Ambulatory referral/consult to Orthopedics; Future; Expected date: 04/16/2024     Can take over-the-counter Tylenol   Apply some Voltaren gel and warm packs      Medication List with Changes/Refills   Current Medications    (MAGIC MOUTHWASH) 1:1 BENADRYL  12.5MG/5ML LIQ, MYLANTA, LIDOCAINE VISCOUS 2%, NYSTATIN 100,000U, PREDNISOLONE 15MG/5ML, DISTILLED WATER    Swish and swallow 480 mLs every 4 (four) hours as needed (pain).       Start Date: 11/29/2023End Date: --    ALBUTEROL (PROVENTIL) 2 MG TAB    Take 1 tablet (2 mg total) by mouth 3 (three) times daily.       Start Date: 2/16/2024 End Date: --    ALBUTEROL (PROVENTIL/VENTOLIN HFA) 90 MCG/ACTUATION INHALER    Inhale 2 puffs into the lungs every 6 (six) hours as needed for Wheezing. Rescue       Start Date: 11/30/2023End Date: --    ATORVASTATIN (LIPITOR) 20 MG TABLET    Take 1 tablet (20 mg total) by mouth once daily.       Start Date: 2/27/2024 End Date: 2/26/2025    BUPROPION (WELLBUTRIN XL) 150 MG TB24 TABLET    Take 300 mg by mouth once daily.       Start Date: --        End Date: --    CLONAZEPAM (KLONOPIN) 1 MG TABLET    Take 1 mg by mouth 2 (two) times daily as needed for Anxiety.       Start Date: --        End Date: --    CLOPIDOGREL (PLAVIX) 75 MG TABLET    Take 75 mg by mouth once daily.       Start Date: --        End Date: --    ESTRADIOL (ESTRACE) 0.01 % (0.1 MG/GRAM) VAGINAL CREAM    Place 1 g vaginally twice a week.       Start Date: 8/28/2023 End Date: --    FINASTERIDE (PROSCAR) 5 MG TABLET    Take 5 mg by mouth once daily.       Start Date: --        End Date: --    FOLIC ACID (FOLVITE) 1 MG TABLET    Take 1 mg by mouth once daily.       Start Date: --        End Date: --    FOLIC ACID-VIT B6-VIT B12 2.5-25-2 MG (FOLBIC OR EQUIV) 2.5-25-2 MG TAB    Take 1 tablet by mouth once daily.       Start Date: 12/8/2016 End Date: --    KETOCONAZOLE (NIZORAL) 2 % SHAMPOO    Apply 120 mLs topically twice a week.       Start Date: 8/27/2023 End Date: --    LIOTHYRONINE (CYTOMEL) 5 MCG TAB    Take 2 tablets (10 mcg total) by mouth once daily.       Start Date: 12/8/2016 End Date: 9/21/2023    METHYLPHENIDATE HCL (RITALIN) 20 MG TABLET    Take 20 mg by mouth 2 (two) times daily.       Start Date: --         End Date: --    MINOXIDIL (LONITEN) 2.5 MG TABLET    Take 2.5 mg by mouth once daily.       Start Date: --        End Date: --    MOMETASONE-FORMOTEROL (DULERA) 200-5 MCG/ACTUATION INHALER    Inhale 2 puffs into the lungs 2 (two) times daily. Controller       Start Date: --        End Date: --    OMEGA-3 FATTY ACIDS/FISH OIL (FISH OIL-OMEGA-3 FATTY ACIDS) 300-1,000 MG CAPSULE    Take 1 capsule by mouth once daily.       Start Date: --        End Date: --    OXYBUTYNIN (DITROPAN-XL) 10 MG 24 HR TABLET    Take 10 mg by mouth once daily.       Start Date: --        End Date: --    PANTOPRAZOLE (PROTONIX) 40 MG TABLET    Take 40 mg by mouth once daily.       Start Date: --        End Date: --    PRAMOXINE 1 % FOAM    Place rectally every 10 days.       Start Date: --        End Date: --    QUETIAPINE (SEROQUEL) 200 MG TAB    Take 1 tablet (200 mg total) by mouth every evening.       Start Date: 12/8/2016 End Date: 9/21/2023    VILAZODONE (VIIBRYD) 20 MG TAB    Take 20 mg by mouth Daily.       Start Date: --        End Date: --    ZOLPIDEM (AMBIEN) 10 MG TAB    Take 10 mg by mouth nightly as needed.       Start Date: --        End Date: --          Follow up in about 6 months (around 10/9/2024) for Follow Up DM.   In addition to their scheduled follow up, the patient has also been instructed to follow up on as needed basis.

## 2024-04-11 ENCOUNTER — HOSPITAL ENCOUNTER (OUTPATIENT)
Dept: RADIOLOGY | Facility: HOSPITAL | Age: 64
Discharge: HOME | End: 2024-04-11
Attending: STUDENT IN AN ORGANIZED HEALTH CARE EDUCATION/TRAINING PROGRAM
Payer: MEDICARE

## 2024-04-11 ENCOUNTER — TELEPHONE (OUTPATIENT)
Dept: FAMILY MEDICINE | Facility: CLINIC | Age: 64
End: 2024-04-11
Payer: MEDICARE

## 2024-04-11 DIAGNOSIS — R52 PAIN: ICD-10-CM

## 2024-04-11 DIAGNOSIS — M79.604 RIGHT LEG PAIN: Primary | ICD-10-CM

## 2024-04-11 DIAGNOSIS — M79.604 RIGHT LEG PAIN: ICD-10-CM

## 2024-04-11 DIAGNOSIS — M79.641 RIGHT HAND PAIN: ICD-10-CM

## 2024-04-11 PROCEDURE — 73130 X-RAY EXAM OF HAND: CPT | Mod: TC,RT

## 2024-04-11 PROCEDURE — 73565 X-RAY EXAM OF KNEES: CPT | Mod: TC

## 2024-04-11 PROCEDURE — 73630 X-RAY EXAM OF FOOT: CPT | Mod: TC,RT

## 2024-04-11 PROCEDURE — 73552 X-RAY EXAM OF FEMUR 2/>: CPT | Mod: TC,RT

## 2024-04-11 NOTE — TELEPHONE ENCOUNTER
----- Message from Tennille Conrad MD sent at 4/11/2024  3:33 PM CDT -----  Please inform patient of XR results.    1. 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 Continue physical therapy and referral to ortho sent      Thanks,  Dr. Conrad

## 2024-04-11 NOTE — TELEPHONE ENCOUNTER
----- Message from Tennille Conrad MD sent at 4/11/2024  3:38 PM CDT -----  Please inform patient of  XR results.    1. Chronic changes without acute fracture or dislocation.   2. Continue ice packs along with physical therapy and over-the-counter analgesics like Tylenol    Thanks,    Dr. Conrad

## 2024-04-11 NOTE — PROGRESS NOTES
Please inform patient of  XR results.    1. Chronic changes without acute fracture or dislocation.   2. Continue ice packs along with physical therapy and over-the-counter analgesics like Tylenol    Thanks,    Dr. Conrad

## 2024-04-11 NOTE — PROGRESS NOTES
Please inform patient of XR results.    1. 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 Continue physical therapy and referral to ortho sent      Thanks,  Dr. Conrad

## 2024-04-12 NOTE — PROGRESS NOTES
Please inform patient of XR results.    1. Mild chronic changes.  Fracture seen   Continue physical therapy  Can take over-the-counter Tylenol/ibuprofen for pain control  .    Thanks,  Dr. Conrad [FreeTextEntry1] : 55 y/o w migraine Has\par Pituitary Adenoma\par Pain syndrome\par Vertigo

## 2024-04-15 ENCOUNTER — HOSPITAL ENCOUNTER (OUTPATIENT)
Dept: RADIOLOGY | Facility: HOSPITAL | Age: 64
Discharge: HOME | End: 2024-04-15
Attending: STUDENT IN AN ORGANIZED HEALTH CARE EDUCATION/TRAINING PROGRAM
Payer: MEDICARE

## 2024-04-15 ENCOUNTER — PATIENT MESSAGE (OUTPATIENT)
Dept: FAMILY MEDICINE | Facility: CLINIC | Age: 64
End: 2024-04-15
Payer: MEDICARE

## 2024-04-15 ENCOUNTER — TELEPHONE (OUTPATIENT)
Dept: FAMILY MEDICINE | Facility: CLINIC | Age: 64
End: 2024-04-15
Payer: MEDICARE

## 2024-04-15 DIAGNOSIS — M79.604 RIGHT LEG PAIN: ICD-10-CM

## 2024-04-15 PROCEDURE — 73590 X-RAY EXAM OF LOWER LEG: CPT | Mod: TC,RT

## 2024-04-15 NOTE — TELEPHONE ENCOUNTER
----- Message from Tennille Conrad MD sent at 4/15/2024 12:02 PM CDT -----  Please inform patient of lXR results.    X-ray knee demonstrates no acute fracture or dislocation   Continue physical therapy    Thanks,    Dr. Conrad

## 2024-04-15 NOTE — PROGRESS NOTES
Please inform patient of lXR results.    X-ray knee demonstrates no acute fracture or dislocation   Continue physical therapy    Thanks,    Dr. Conrad

## 2024-04-15 NOTE — PROGRESS NOTES
Please inform about the x-ray results to the patient   No acute fracture seen   Continue physical therapy  Can apply ice to the bruise    Thanks,    Dr. Conrad

## 2024-04-18 ENCOUNTER — OFFICE VISIT (OUTPATIENT)
Dept: ORTHOPEDICS | Facility: CLINIC | Age: 64
End: 2024-04-18
Payer: MEDICARE

## 2024-04-18 ENCOUNTER — HOSPITAL ENCOUNTER (OUTPATIENT)
Dept: RADIOLOGY | Facility: CLINIC | Age: 64
Discharge: HOME | End: 2024-04-18
Attending: PHYSICIAN ASSISTANT
Payer: MEDICARE

## 2024-04-18 VITALS
HEIGHT: 65 IN | SYSTOLIC BLOOD PRESSURE: 137 MMHG | DIASTOLIC BLOOD PRESSURE: 82 MMHG | HEART RATE: 97 BPM | WEIGHT: 181.63 LBS | BODY MASS INDEX: 30.26 KG/M2

## 2024-04-18 DIAGNOSIS — M79.641 RIGHT HAND PAIN: ICD-10-CM

## 2024-04-18 DIAGNOSIS — S60.551A SUPERFICIAL FOREIGN BODY OF RIGHT HAND, INITIAL ENCOUNTER: Primary | ICD-10-CM

## 2024-04-18 PROCEDURE — 3079F DIAST BP 80-89 MM HG: CPT | Mod: CPTII,,, | Performed by: PHYSICIAN ASSISTANT

## 2024-04-18 PROCEDURE — 3075F SYST BP GE 130 - 139MM HG: CPT | Mod: CPTII,,, | Performed by: PHYSICIAN ASSISTANT

## 2024-04-18 PROCEDURE — 99214 OFFICE O/P EST MOD 30 MIN: CPT | Mod: ,,, | Performed by: PHYSICIAN ASSISTANT

## 2024-04-18 PROCEDURE — 3288F FALL RISK ASSESSMENT DOCD: CPT | Mod: CPTII,,, | Performed by: PHYSICIAN ASSISTANT

## 2024-04-18 PROCEDURE — 1159F MED LIST DOCD IN RCRD: CPT | Mod: CPTII,,, | Performed by: PHYSICIAN ASSISTANT

## 2024-04-18 PROCEDURE — 3008F BODY MASS INDEX DOCD: CPT | Mod: CPTII,,, | Performed by: PHYSICIAN ASSISTANT

## 2024-04-18 PROCEDURE — 1101F PT FALLS ASSESS-DOCD LE1/YR: CPT | Mod: CPTII,,, | Performed by: PHYSICIAN ASSISTANT

## 2024-04-18 PROCEDURE — 1160F RVW MEDS BY RX/DR IN RCRD: CPT | Mod: CPTII,,, | Performed by: PHYSICIAN ASSISTANT

## 2024-04-18 PROCEDURE — 73130 X-RAY EXAM OF HAND: CPT | Mod: RT,,, | Performed by: PHYSICIAN ASSISTANT

## 2024-04-18 PROCEDURE — 3044F HG A1C LEVEL LT 7.0%: CPT | Mod: CPTII,,, | Performed by: PHYSICIAN ASSISTANT

## 2024-04-18 NOTE — PROGRESS NOTES
Chief Complaint:   Chief Complaint   Patient presents with    Right Hand - Pain     Rt hand pain- Ongoing pain since fall, stated trip in a hole and fell with a glass bottle in hand. States feels like there is still glass inside of hand. Pain is constant. Reports pain is only in thumb and only relieve is when pressure is applied.         History of present illness:    This is a 63 y.o. year old female who complains of right hand pain.    Patient reports that about a year ago she was carrying a glass coke bottle in her hand she sustained a fall landing on the hand but bottle broke and had shards of glass in her hand along with a laceration.    She went to the emergency department had the glass removed and the laceration sutured.    She states everything healed up but she was still has some discomfort and at times for hand seems to contract over from her the thumb side but she also feels like there is something still inside the palmar surface in the thenar eminence of her right hand.    She denies any numbness or tingling.    No specific incident it was seemed to bring on the spasm      Current Outpatient Medications   Medication Sig Dispense Refill    (Magic mouthwash) 1:1 Benadryl 12.5mg/5ml liq, mylanta, LIDOcaine viscous 2%, nystatin 100,000u, prednisolone 15mg/5mL, distilled water Swish and swallow 480 mLs every 4 (four) hours as needed (pain). 480 mL 3    albuterol (PROVENTIL/VENTOLIN HFA) 90 mcg/actuation inhaler Inhale 2 puffs into the lungs every 6 (six) hours as needed for Wheezing. Rescue 18 g 1    atorvastatin (LIPITOR) 20 MG tablet Take 1 tablet (20 mg total) by mouth once daily. 90 tablet 3    buPROPion (WELLBUTRIN XL) 150 MG TB24 tablet Take 150 mg by mouth once daily.      buPROPion (WELLBUTRIN XL) 300 MG 24 hr tablet Take 300 mg by mouth once daily.      clonazePAM (KLONOPIN) 1 MG tablet Take 1 mg by mouth 2 (two) times daily as needed for Anxiety.      clopidogreL (PLAVIX) 75 mg tablet Take 75 mg  by mouth once daily.      ketoconazole (NIZORAL) 2 % shampoo Apply 120 mLs topically twice a week.      lisdexamfetamine (VYVANSE) 70 MG capsule Take 70 mg by mouth every morning.      minoxidiL (LONITEN) 2.5 MG tablet Take 2.5 mg by mouth once daily.      mometasone-formoterol (DULERA) 200-5 mcg/actuation inhaler Inhale 2 puffs into the lungs 2 (two) times daily. Controller      oxybutynin (DITROPAN-XL) 10 MG 24 hr tablet Take 10 mg by mouth once daily.      pantoprazole (PROTONIX) 40 MG tablet Take 40 mg by mouth once daily.      pramoxine 1 % Foam Place rectally every 10 days.      albuterol (PROVENTIL) 2 MG Tab Take 1 tablet (2 mg total) by mouth 3 (three) times daily. (Patient not taking: Reported on 4/9/2024) 30 tablet 3    estradioL (ESTRACE) 0.01 % (0.1 mg/gram) vaginal cream Place 1 g vaginally twice a week. (Patient not taking: Reported on 4/9/2024)      finasteride (PROSCAR) 5 mg tablet Take 5 mg by mouth once daily.      folic acid (FOLVITE) 1 MG tablet Take 1 mg by mouth once daily. (Patient not taking: Reported on 4/9/2024)      folic acid-vit B6-vit B12 2.5-25-2 mg (FOLBIC OR EQUIV) 2.5-25-2 mg Tab Take 1 tablet by mouth once daily. (Patient not taking: Reported on 4/9/2024) 30 tablet 1    omega-3 fatty acids/fish oil (FISH OIL-OMEGA-3 FATTY ACIDS) 300-1,000 mg capsule Take 1 capsule by mouth once daily. (Patient not taking: Reported on 4/9/2024)      vilazodone (VIIBRYD) 20 mg Tab Take 20 mg by mouth Daily.       No current facility-administered medications for this visit.       Review of Systems:    Constitution:   Denies chills, fever, and sweats.  HENT:   Denies headaches or blurry vision.  Cardiovascular:  Denies chest pain or irregular heart beat.  Respiratory:   Denies cough or shortness of breath.  Gastrointestinal:  Denies abdominal pain, nausea, or vomiting.  Musculoskeletal:   Denies muscle cramps.  Neurological:   Denies dizziness or focal weakness.  Psychiatric/Behavior: Normal mental  "status.  Hematology/Lymph:  Denies bleeding problem or easy bruising/bleeding.  Skin:    Denies rash or suspicious lesions.    Examination:    Vital Signs:    Vitals:    04/18/24 0817   BP: 137/82   Pulse: 97   Weight: 82.4 kg (181 lb 9.6 oz)   Height: 5' 5" (1.651 m)       Body mass index is 30.22 kg/m².    Constitution:   Well-developed, well nourished patient in no acute distress.  Neurological:   Alert and oriented x 3 and cooperative to examination.     Psychiatric/Behavior: Normal mental status.  Respiratory:   No shortness of breath.  Eyes:    Extraoccular muscles intact  Skin:    No scars, rash or suspicious lesions.    Physical Exam:   Right hand and wrist exam  No obvious deformity.  Negative tenderness over distal radius.  Supination and pronation to 90 degrees and 90 degrees, respectively.  Wrist flexion to 90 degrees and wrist extension to 70 degree.  Negative Tinel's test.  Negative Finkelstein's test.  5/5 strength, normal skin appearance and palpable pulses  Brisk capillary refill to all the digits.    Patient was 2+ radial pulse palpated.    She has a negative grind test.    No CMC tenderness.    Negative triggering of the fingers.    There is a well-healed scar from her previous laceration with a pair.    There does appear to be some type of mass superficial on the thenar eminence side which could be scar tissue or it could be retained foreign body.          Imaging: X-rays ordered and images interpreted today personally by me of right hand four views  There is no acute osseous abnormalities noted.    There is no gross foreign bodies that I can appreciate on x-ray today        Assessment: Superficial foreign body of right hand, initial encounter  -     MRI Hand Fingers Without Contrast Right; Future; Expected date: 04/19/2024    Right hand pain  -     X-Ray Hand 3 view Right; Future; Expected date: 04/18/2024  -     Ambulatory referral/consult to Orthopedics         Plan:  At this point the patient " would like to have it worked up further and we will get an MRI of her right hand to rule out any type of retained foreign body which we would more likely be in the glass in the bottle.    She will follow up with Dr. Steinberg after obtaining the MRI of the hand          DISCLAIMER: This note may have been dictated using voice recognition software and may contain grammatical errors.     NOTE: Consult report sent to referring provider via [employer] EMR.

## 2024-04-24 ENCOUNTER — OFFICE VISIT (OUTPATIENT)
Dept: BEHAVIORAL HEALTH | Facility: CLINIC | Age: 64
End: 2024-04-24
Payer: MEDICARE

## 2024-04-24 VITALS
DIASTOLIC BLOOD PRESSURE: 81 MMHG | HEART RATE: 73 BPM | OXYGEN SATURATION: 99 % | WEIGHT: 180.88 LBS | SYSTOLIC BLOOD PRESSURE: 118 MMHG | TEMPERATURE: 98 F | BODY MASS INDEX: 30.1 KG/M2

## 2024-04-24 DIAGNOSIS — F41.1 GAD (GENERALIZED ANXIETY DISORDER): ICD-10-CM

## 2024-04-24 DIAGNOSIS — F33.2 SEVERE EPISODE OF RECURRENT MAJOR DEPRESSIVE DISORDER, WITHOUT PSYCHOTIC FEATURES: Primary | ICD-10-CM

## 2024-04-24 DIAGNOSIS — F90.9 ADULT ADHD: ICD-10-CM

## 2024-04-24 PROCEDURE — 3079F DIAST BP 80-89 MM HG: CPT | Mod: CPTII,,, | Performed by: STUDENT IN AN ORGANIZED HEALTH CARE EDUCATION/TRAINING PROGRAM

## 2024-04-24 PROCEDURE — 99213 OFFICE O/P EST LOW 20 MIN: CPT | Mod: PBBFAC,PN | Performed by: STUDENT IN AN ORGANIZED HEALTH CARE EDUCATION/TRAINING PROGRAM

## 2024-04-24 PROCEDURE — 3044F HG A1C LEVEL LT 7.0%: CPT | Mod: CPTII,,, | Performed by: STUDENT IN AN ORGANIZED HEALTH CARE EDUCATION/TRAINING PROGRAM

## 2024-04-24 PROCEDURE — 1159F MED LIST DOCD IN RCRD: CPT | Mod: CPTII,,, | Performed by: STUDENT IN AN ORGANIZED HEALTH CARE EDUCATION/TRAINING PROGRAM

## 2024-04-24 PROCEDURE — 3008F BODY MASS INDEX DOCD: CPT | Mod: CPTII,,, | Performed by: STUDENT IN AN ORGANIZED HEALTH CARE EDUCATION/TRAINING PROGRAM

## 2024-04-24 PROCEDURE — 1160F RVW MEDS BY RX/DR IN RCRD: CPT | Mod: CPTII,,, | Performed by: STUDENT IN AN ORGANIZED HEALTH CARE EDUCATION/TRAINING PROGRAM

## 2024-04-24 PROCEDURE — 99205 OFFICE O/P NEW HI 60 MIN: CPT | Mod: S$PBB,,, | Performed by: STUDENT IN AN ORGANIZED HEALTH CARE EDUCATION/TRAINING PROGRAM

## 2024-04-24 PROCEDURE — 3074F SYST BP LT 130 MM HG: CPT | Mod: CPTII,,, | Performed by: STUDENT IN AN ORGANIZED HEALTH CARE EDUCATION/TRAINING PROGRAM

## 2024-04-24 RX ORDER — BUPROPION HYDROCHLORIDE 150 MG/1
150 TABLET ORAL DAILY
Qty: 30 TABLET | Refills: 5 | Status: SHIPPED | OUTPATIENT
Start: 2024-04-24

## 2024-04-24 RX ORDER — VILAZODONE HYDROCHLORIDE 20 MG/1
20 TABLET ORAL DAILY
Qty: 30 TABLET | Refills: 5 | Status: SHIPPED | OUTPATIENT
Start: 2024-04-24

## 2024-04-24 RX ORDER — BUPROPION HYDROCHLORIDE 300 MG/1
TABLET ORAL
Qty: 30 TABLET | Refills: 5 | Status: SHIPPED | OUTPATIENT
Start: 2024-04-24

## 2024-04-24 RX ORDER — LISDEXAMFETAMINE DIMESYLATE 70 MG/1
70 CAPSULE ORAL EVERY MORNING
Qty: 30 CAPSULE | Refills: 0 | Status: SHIPPED | OUTPATIENT
Start: 2024-05-26

## 2024-04-24 RX ORDER — LISDEXAMFETAMINE DIMESYLATE 70 MG/1
70 CAPSULE ORAL EVERY MORNING
Qty: 30 CAPSULE | Refills: 0 | Status: SHIPPED | OUTPATIENT
Start: 2024-04-27

## 2024-04-24 RX ORDER — CLONAZEPAM 1 MG/1
1 TABLET ORAL 3 TIMES DAILY PRN
Qty: 85 TABLET | Refills: 0 | Status: SHIPPED | OUTPATIENT
Start: 2024-04-24
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
## 2024-04-29 ENCOUNTER — TELEPHONE (OUTPATIENT)
Dept: FAMILY MEDICINE | Facility: CLINIC | Age: 64
End: 2024-04-29
Payer: MEDICARE

## 2024-04-29 DIAGNOSIS — I25.10 CORONARY ARTERY DISEASE, UNSPECIFIED VESSEL OR LESION TYPE, UNSPECIFIED WHETHER ANGINA PRESENT, UNSPECIFIED WHETHER NATIVE OR TRANSPLANTED HEART: Primary | ICD-10-CM

## 2024-04-29 NOTE — TELEPHONE ENCOUNTER
----- Message from Ariel Armstrong sent at 4/29/2024 12:29 PM CDT -----  .Type:  Needs Medical Advice    Who Called: Selena  Symptoms (please be specific):    How long has patient had these symptoms:    Pharmacy name and phone #:    Would the patient rather a call back or a response via [facility]?   Best Call Back Number: [phone]  Additional Information: Requested a call back from the nurse re: a name of medication called Plavix She told me that she needed the medication as she has been falling a lot. She had called last week about this issue. Please give her call back today.

## 2024-04-29 NOTE — TELEPHONE ENCOUNTER
----- Message from Ariel Armstrong sent at 4/29/2024 12:29 PM CDT -----  .Type:  Needs Medical Advice    Who Called: Selena  Symptoms (please be specific):    How long has patient had these symptoms:    Pharmacy name and phone #:    Would the patient rather a call back or a response via [facility]?   Best Call Back Number: [phone]  Additional Information: Requested a call back from the nurse re: a name of medication called Plavix She told me that she needed the medication as she has been falling a lot. She had called last week about this issue. Please give her call back today.

## 2024-04-29 NOTE — TELEPHONE ENCOUNTER
The patient is asking if you would fill the Plavix 75 mg one daily. She does not have an appointment with Dr. Garcia until May 17th.  It is not on her active med list but she has stents and has been on. Please advise

## 2024-04-30 RX ORDER — CLOPIDOGREL BISULFATE 75 MG/1
75 TABLET ORAL DAILY
Qty: 30 TABLET | Refills: 5 | Status: SHIPPED | OUTPATIENT
Start: 2024-04-30 | End: 2025-04-30

## 2024-05-01 NOTE — TELEPHONE ENCOUNTER
The patient states that she was seeing Dr. Boris Dubois but stopped due to procedure which she states went wrong, but had several refills of plavix.  You referred her to Dr. Garcia in November and has an appt scheduled next month but just ran out of the Plavix and is asking to fill until her appt with Dr. Garcia. Please advise.

## 2024-07-15 ENCOUNTER — TELEPHONE (OUTPATIENT)
Dept: FAMILY MEDICINE | Facility: CLINIC | Age: 64
End: 2024-07-15
Payer: MEDICARE

## 2024-07-15 NOTE — TELEPHONE ENCOUNTER
----- Message from Gabriella Wagner sent at 7/12/2024  1:00 PM CDT -----  .Type:  Patient Returning Call    Who Called:pt   Who Left Message for Patient:  Does the patient know what this is regarding?:needs the PA for the pt to continue PT. States that she did not continue because she stuck her hand with an exacto knife.   Would the patient rather a call back or a response via [facility]? Call back   Best Call Back Number:[phone]  Additional Information:

## 2024-08-16 ENCOUNTER — PATIENT OUTREACH (OUTPATIENT)
Facility: CLINIC | Age: 64
End: 2024-08-16
Payer: MEDICARE

## 2024-08-16 ENCOUNTER — TELEPHONE (OUTPATIENT)
Dept: FAMILY MEDICINE | Facility: CLINIC | Age: 64
End: 2024-08-16
Payer: MEDICARE

## 2024-08-16 NOTE — TELEPHONE ENCOUNTER
Are there any outstanding tasks in the patients's chart (ex.labs,MM,etc)? no  Do we have outstanding/pending referrals? no  Has the patient been seen in and ER,[facility], or been admitted since last visit? no  Has the patient seen any other health care provider(doctors) since last visit? no  Has the patient had any bloodwork or x-rays done since last visit? No

## 2024-08-16 NOTE — PROGRESS NOTES
Health Maintenance Topic(s) Outreach Outcomes & Actions Taken:  Chart review    Eye Exam - Outreach Outcomes & Actions Taken  : DM Eye exam requested , Dr. Bojorquez         Additional Notes:           Care Management, Digital Medicine, and/or Education Referrals   Eligible for Diabetic Education

## 2024-08-16 NOTE — LETTER
"       AUTHORIZATION FOR RELEASE OF   CONFIDENTIAL INFORMATION    Dear Octavio,    We are seeing Selena Cunningham, date of birth 1960, in the clinic at [facility] [place]. Tennille Conrad MD is the patient's PCP. Selena Cunningham has an outstanding lab/procedure at the time we reviewed her chart. In order to help keep her health information updated, she has authorized us to request the following medical record(s):        (  )  MAMMOGRAM                                      (  )  COLONOSCOPY      (  )  PAP SMEAR                                          (  )  OUTSIDE LAB RESULTS     (  )  DEXA SCAN                                          ( xx )  DM EYE EXAM   ,         (  )  FOOT EXAM                                          (  )  ENTIRE RECORD     (  )  OUTSIDE IMMUNIZATIONS                 (  )  _______________         Please fax records to [facility], Choudhary, Shalini, MD,   [phone]  Attn: Kim      If you have any questions, please contact Guero Ayoub (Connie)Care Coordinator @ [phone]    Patient Name: Selena Cunningham  : 1960  Patient Phone #: [phone]                                   Selena Cunningham  MRN: [id number]  : 1960  Age: 63 y.o.  Sex: female         Patient/Legal Guardian Signature  This signature was collected at 2023    Selena Cunningham       _______________________________   Printed Name/Relationship to Patient      Consent for Examination and Treatment: I hereby authorize the providers and employees of [facility] ([facility]) to provide medical treatment/services which includes, but is not limited to, performing and administering tests and diagnostic procedures that are deemed necessary, including, but not limited to, imaging examinations, blood tests and other laboratory procedures as may be required by the hospital, clinic, or may be ordered by my physician(s) or persons working under the general and/or special instructions of my " physician(s).      I understand and agree that this consent covers all authorized persons, including but not limited to physicians, residents, nurse practitioners, physicians' assistants, specialists, consultants, student nurses, and independently contracted physicians, who are called upon by the physician in charge, to carry out the diagnostic procedures and medical or surgical treatment.     I hereby authorize [facility] to retain or dispose of any specimens or tissue, should there be such remaining from any test or procedure.     I hereby authorize and give consent for [facility] providers and employees to take photographs, images or videotapes of such diagnostic, surgical or treatment procedures of Patient as may be required by [facility] or as may be ordered by a physician. I further acknowledge and agree that [facility] may use cameras or other devices for patient monitoring.     I am aware that the practice of medicine is not an exact science, and I acknowledge that no guarantees have been made to me as to the outcome of any tests, procedures or treatment.     Authorization for Release of Information: I understand that my insurance company and/or their agents may need information necessary to make determinations about payment/reimbursement. I hereby provide authorization to release to all insurance companies, their successors, assignees, other parties with whom they may have contracted, or others acting on their behalf, that are involved with payment for any hospital and/or clinic charges incurred by the patient, any information that they request and deem necessary for payment/reimbursement, and/or quality review.  I further authorize the release of my health information to physicians or other health care practitioners on staff who are involved in my health care now and in the future, and to other health care providers, entities, or institutions for the purpose of my continued care and treatment, including referrals.      REGISTRATION AUTHORIZATION  Form No. 57784 (Rev. 7/13/2022)       Medicare Patient's Certification and Authorization to Release Information and Payment Request:  I certify that the information given by me in applying for payment under Title XVIII of the Social Security Act is correct. I authorize any frey of medical or other information about me to release to the [facility], or its intermediaries or carriers, any information needed for this or a related Medicare claim. I request that payment of authorized benefits be made on my behalf.     Assignment of Insurance Benefits:   I hereby authorize any and all insurance companies, health plans, defined   benefit plans, health insurers or any entity that is or may be responsible for payment of my medical expenses to pay all hospital and medical benefits now due, and to become due and payable to me under any hospital benefits, sick benefits, injury benefits or any other benefit for services rendered to me, including Major Medical Benefits, direct to [facility] and all independently contracted physicians. I assign any and all rights that I may have against any and all insurance companies, health plans, defined benefit plans, health insurers or any entity that is or may be responsible for payment of my medical expenses, including, but not limited to any right to appeal a denial of a claim, any right to bring any action, lawsuit, administrative proceeding, or other cause of action on my behalf. I specifically assign my right to pursue litigation against any and all insurance companies, health plans, defined benefit plans, health insurers or any entity that is or may be responsible for payment of my medical expenses based upon a refusal to pay charges.            E. Valuables: It is understood and agreed that [facility] is not liable for the damage to or loss of any money, jewelry,   documents, dentures, eye glasses, hearing aids, prosthetics, or other  property of value.     F. Computer Equipment: I understand and agree that should I choose to use computer equipment owned by [facility] or if I choose to access the Internet via [facility]s network, I do so at my own risk. [facility] is not responsible for any damage to my computer equipment or to any damages of any type that might arise from my loss of equipment or data.     G. Acceptance of Financial Responsibility:  I agree that in consideration of the services and   supplies that have been   or will be furnished to the patient, I am hereby obligated to pay all charges made for or on the account of the patient according to the standard rates (in effect at the time the services and supplies are delivered) established by [facility], including its Patient Financial Assistance Policy to the extent it is applicable. I understand that I am responsible for all charges, or portions thereof, not covered by insurance or other sources. Patient refunds will be distributed only after balances at all [facility] facilities are paid.     H. Communication Authorization:  I hereby authorize [facility] and its representatives, along with any billing service   or  who may work on their behalf, to contact me on   my cell phone and/or home phone using pre- recorded messages, artificial voice messages, automatic telephone dialing devices or other computer assisted technology, or by electronic      mail, text messaging, or by any other form of electronic communication. This includes, but is not limited to, appointment reminders, yearly physical exam reminders, preventive care reminders, patient campaigns, welcome calls, and calls about account balances on my account or any account on which I am listed as a guarantor. I understand I have the right to opt out of these communications at any time.      Relationship  Between  Facility and  Provider:      I understand that some, but not all, providers furnishing services to the patient are not  employees or agents of [facility]. The patient is under the care and supervision of his/her attending physician, and it is the responsibility of the facility and its nursing staff to carry out the instructions of such physicians. It is the responsibility of the patient's physician/designee to obtain the patient's informed consent, when required, for medical or surgical treatment, special diagnostic or therapeutic procedures, or hospital services rendered for the patient under the special instructions of the physician/designee.     REGISTRATION AUTHORIZATION  Form No. 96864 (Rev. 7/13/2022)      Notice of Privacy Practices: I acknowledge I have received a copy of [facility]'s Notice of Privacy Practices.     Facility  Directory: I have discussed with the organization my desire to be either included or excluded  in the facility directory in the event of my being an inpatient at an [facility] facility. I understand that if my choice is to opt-out of being identified in the facility directory that the facility will not provide any information about me such as my condition (e.g. fair, stable, etc.) or my location in the facility (e.g., room number, department).     Immunizations: [facility] shares immunization information with state sponsored health departments to help you and your doctor keep track of your immunization records. By signing, you consent to have this information shared with the health department in your state:                                [place] - LINKS ([place] Immunization Network for Kids Statewide)                                [place] - MIIX ([place] Immunization Information eXchange)                                [place] - ImmPRINT (Immunization Patient Registry with Integrated Technology)     TERM: This authorization is valid for this and subsequent care/treatment I receive at [facility] and will remain valid unless/until revoked in writing by me.     [facility]: As used in this  document, [facility] means all [facility] owned and managed facilities, including, but not limited to, all health centers, surgery centers, clinics, urgent care centers, and hospitals.         [facility] complies with applicable Federal civil rights laws and does not discriminate on the basis of race, color, national origin, age, disability, or sex.  ATENCIÓN: si habla español, tiene a flanagan disposición servicios gratuitos de asistencia lingüística. Carroll henriquez [phone].  CHÚ Ý: N?u b?n nói Ti?ng Vi?t, có các d?ch v? h? tr? ngôn ng? mi?n phí dành cho b?n. G?i s? [phone].        REGISTRATION AUTHORIZATION  Form No. 05960 (Rev. 7/13/2022)

## 2024-08-19 ENCOUNTER — OFFICE VISIT (OUTPATIENT)
Dept: FAMILY MEDICINE | Facility: CLINIC | Age: 64
End: 2024-08-19
Payer: MEDICARE

## 2024-08-19 VITALS
DIASTOLIC BLOOD PRESSURE: 74 MMHG | SYSTOLIC BLOOD PRESSURE: 115 MMHG | TEMPERATURE: 99 F | HEART RATE: 104 BPM | WEIGHT: 173 LBS | HEIGHT: 64 IN | RESPIRATION RATE: 18 BRPM | OXYGEN SATURATION: 95 % | BODY MASS INDEX: 29.53 KG/M2

## 2024-08-19 DIAGNOSIS — I73.9 PERIPHERAL VASCULAR DISEASE: ICD-10-CM

## 2024-08-19 DIAGNOSIS — N63.11 MASS OF UPPER OUTER QUADRANT OF RIGHT BREAST: Primary | ICD-10-CM

## 2024-08-19 DIAGNOSIS — E78.2 MIXED HYPERLIPIDEMIA: ICD-10-CM

## 2024-08-19 DIAGNOSIS — Z13.29 SCREENING FOR THYROID DISORDER: ICD-10-CM

## 2024-08-19 DIAGNOSIS — F34.1 PERSISTENT DEPRESSIVE DISORDER: ICD-10-CM

## 2024-08-19 DIAGNOSIS — M65.311 TRIGGER FINGER OF RIGHT THUMB: ICD-10-CM

## 2024-08-19 DIAGNOSIS — Z11.59 ENCOUNTER FOR HEPATITIS C SCREENING TEST FOR LOW RISK PATIENT: ICD-10-CM

## 2024-08-19 DIAGNOSIS — R26.89 BALANCE PROBLEM: ICD-10-CM

## 2024-08-19 DIAGNOSIS — R73.03 PREDIABETES: ICD-10-CM

## 2024-08-19 PROCEDURE — 1159F MED LIST DOCD IN RCRD: CPT | Mod: CPTII,,, | Performed by: STUDENT IN AN ORGANIZED HEALTH CARE EDUCATION/TRAINING PROGRAM

## 2024-08-19 PROCEDURE — 3074F SYST BP LT 130 MM HG: CPT | Mod: CPTII,,, | Performed by: STUDENT IN AN ORGANIZED HEALTH CARE EDUCATION/TRAINING PROGRAM

## 2024-08-19 PROCEDURE — 3044F HG A1C LEVEL LT 7.0%: CPT | Mod: CPTII,,, | Performed by: STUDENT IN AN ORGANIZED HEALTH CARE EDUCATION/TRAINING PROGRAM

## 2024-08-19 PROCEDURE — 3078F DIAST BP <80 MM HG: CPT | Mod: CPTII,,, | Performed by: STUDENT IN AN ORGANIZED HEALTH CARE EDUCATION/TRAINING PROGRAM

## 2024-08-19 PROCEDURE — 1160F RVW MEDS BY RX/DR IN RCRD: CPT | Mod: CPTII,,, | Performed by: STUDENT IN AN ORGANIZED HEALTH CARE EDUCATION/TRAINING PROGRAM

## 2024-08-19 PROCEDURE — 1126F AMNT PAIN NOTED NONE PRSNT: CPT | Mod: CPTII,,, | Performed by: STUDENT IN AN ORGANIZED HEALTH CARE EDUCATION/TRAINING PROGRAM

## 2024-08-19 PROCEDURE — 3288F FALL RISK ASSESSMENT DOCD: CPT | Mod: CPTII,,, | Performed by: STUDENT IN AN ORGANIZED HEALTH CARE EDUCATION/TRAINING PROGRAM

## 2024-08-19 PROCEDURE — 3008F BODY MASS INDEX DOCD: CPT | Mod: CPTII,,, | Performed by: STUDENT IN AN ORGANIZED HEALTH CARE EDUCATION/TRAINING PROGRAM

## 2024-08-19 PROCEDURE — 1101F PT FALLS ASSESS-DOCD LE1/YR: CPT | Mod: CPTII,,, | Performed by: STUDENT IN AN ORGANIZED HEALTH CARE EDUCATION/TRAINING PROGRAM

## 2024-08-19 PROCEDURE — 99214 OFFICE O/P EST MOD 30 MIN: CPT | Mod: ,,, | Performed by: STUDENT IN AN ORGANIZED HEALTH CARE EDUCATION/TRAINING PROGRAM

## 2024-08-19 NOTE — PROGRESS NOTES
Patient ID: [id number]     Chief Complaint: Follow-up        HPI:      Disclaimer:  This note is prepared using voice recognition software and as such is likely to have errors despite attempts at proofreading. Please contact me for questions.     Selena Cunningham is a 64 y.o. female here today for the following      Acute Issues-  Dawit Cunningham is here to discuss the following issues     Right breast mass   Reports that she has intermittent right breast mass popping up and it was painful.  Would like to get imaging done.  Denies of any dimpling of skin/inverted nipples/axillary or supraclavicular lymph node palpation/nipple discharge    Depression   Currently on depression medication but would like to switch her psychiatrist    Right thumb log and is painful      Balance problems  Reports that she has been having balance problems intermittently.     Chronic Issues-    -------------------------------------    Aphthous ulcer    Asthma    Constipation    Dysphagia    Epigastric pain    Fatigue    Fatigue    Folate deficiency    GERD (gastroesophageal reflux disease)    Macrocytosis    Obesity, unspecified        Past Surgical History:   Procedure Laterality Date    ADENOIDECTOMY      Child    APPENDECTOMY      CATARACT EXTRACTION Bilateral 04/2023    CHOLECYSTECTOMY  2023    COLONOSCOPY N/A 05/11/2022    Procedure: COLONOSCOPY;  Surgeon: Damion Martinez MD;  Location: [facility] ENDOSCOPY;  Service: Gastroenterology;  Laterality: N/A;    EYE SURGERY  2022 2023 2024    Cadric left cadric right remove ajit left remove growth same jana graph did not take problem with 2nd graph dr wants to again  will see another doctor    HYSTERECTOMY      LAPAROSCOPIC CHOLECYSTECTOMY      NASAL SINUS SURGERY      TONSILLECTOMY         Review of patient's allergies indicates:   Allergen Reactions    Pcn [penicillins] Rash       Current Outpatient Medications   Medication Instructions    (Magic mouthwash) 1:1 Benadryl 12.5mg/5ml liq,  mylanta, LIDOcaine viscous 2%, nystatin 100,000u, prednisolone 15mg/5mL, distilled water 480 mLs, Swish & Swallow, Every 4 hours PRN    albuterol (PROVENTIL/VENTOLIN HFA) 90 mcg/actuation inhaler 2 puffs, Inhalation, Every 6 hours PRN, Rescue    atorvastatin (LIPITOR) 20 mg, Oral, Daily    buPROPion (WELLBUTRIN XL) 300 MG 24 hr tablet Take 300mg  + 150mg (450mg total) by mouth daily.    buPROPion (WELLBUTRIN XL) 150 mg, Oral, Daily    clonazePAM (KLONOPIN) 1 mg, Oral, 3 times daily PRN    clopidogreL (PLAVIX) 75 mg, Oral, Daily    finasteride (PROSCAR) 5 mg, Oral, Daily    ketoconazole (NIZORAL) 2 % shampoo 120 mLs, Topical (Top), Twice weekly    lisdexamfetamine (VYVANSE) 70 mg, Oral, Every morning    lisdexamfetamine (VYVANSE) 70 mg, Oral, Every morning    mometasone-formoterol (DULERA) 200-5 mcg/actuation inhaler 2 puffs, Inhalation, 2 times daily, Controller    oxybutynin (DITROPAN-XL) 10 mg, Oral, Daily    pantoprazole (PROTONIX) 40 mg, Oral, Daily    pramoxine 1 % Foam Rectal, Every 10 days    vilazodone (VIIBRYD) 20 mg, Oral, Daily       Social History     Socioeconomic History    Marital status:     Number of children: 2   Tobacco Use    Smoking status: Former     Current packs/day: 0.50     Average packs/day: 0.5 packs/day for 5.0 years (2.5 ttl pk-yrs)     Types: Cigarettes     Passive exposure: Past    Smokeless tobacco: Never    Tobacco comments:     Not sure of dates 16 to 24 quit  2 or 3 times for 6 months   Substance and Sexual Activity    Alcohol use: No    Drug use: No    Sexual activity: Not Currently     Partners: Male     Birth control/protection: Condom, Partner-Vasectomy, None     Comment: Pill short time        Family History   Problem Relation Name Age of Onset    Drug abuse Brother Micah & Walt Ragland     Alcohol abuse Brother Argelia Ragland     Asthma Mother Shazia Lewis Ragland     Diabetes Mother Shazia Ragland     Kidney disease Mother Shazia Ragland      "Early death Father Alex Ragland     Heart disease Father Alex Ragland     Stroke Paternal Grandfather Alex Lewis     Cancer Paternal Aunt Eleonora Bocanegra     Drug abuse Brother Micah Ragland     Early death Brother Micah Ragland     Heart disease Brother Micah Ragland     Drug abuse Brother Walt Ragland     Early death Brother Walt Ragland     Heart disease Brother Walt Ragland     Heart disease Maternal Uncle Elfego Ragland     Learning disabilities Son Marcellus Cunningham     Mental illness Son Marcellus Cunningham     Mental illness Daughter Enid Cunningham         Patient Care Team:  Tennille Conrad MD as PCP - General (Family Medicine)  Ruma Bojorquez MD as Consulting Physician (Ophthalmology)     Subjective:     ROS    See HPI for details    Constitutional: Denies Change in appetite. Denies Chills. Denies Fever. Denies Night sweats.  Respiratory: Denies Cough. Denies Shortness of breath. Denies Shortness of breath with exertion. Denies Wheezing.  Cardiovascular: DeniesChest pain at rest. Denies Chest pain with exertion. Denies Irregular heartbeat. Denies Palpitations. Denies Edema.  Gastrointestinal: Denies Abdominal pain. DeniesDiarrhea. Denies Nausea.   Neuro-as HPI  Breast as HPI  MSK as hpi     All Other ROS: Negative except as stated in HPI.  Objective:     Visit Vitals  /74 (BP Location: Right arm, Patient Position: Sitting, BP Method: Medium (Automatic))   Pulse 104   Temp 98.6 °F (37 °C) (Oral)   Resp 18   Ht 5' 3.78" (1.62 m)   Wt 78.5 kg (173 lb)   SpO2 95%   BMI 29.90 kg/m²       Physical Exam    General: Alert and oriented, No acute distress.    Breast-bilateral   Inspection-bilateral breasts symmetrical, round, no dimpling of skin, no nipple discharge, no inverted nipple seen  Palpation-no tenderness bilaterally, no axillary, supraclavicular lymph nodes palpable, no mass palpable     Respiratory: Clear to auscultation bilaterally; No wheezes, rales or rhonchi,Non-labored respirations, Symmetrical chest wall " expansion.  Cardiovascular: Regular rate and rhythm, S1/S2 normal, No murmurs, rubs or gallops.  Gastrointestinal: Soft, Non-tender, Non-distended, Normal bowel sounds, No palpable organomegaly.  Musculoskeletal: Normal range of motion.  Extremities: No clubbing, cyanosis or edema  Neurologic: No focal deficits  Psychiatric: Normal interaction, Coherent speech, Euthymic mood, Appropriate affect   Assessment:       ICD-10-CM ICD-9-CM   1. Mass of upper outer quadrant of right breast  N63.11 611.72   2. Persistent depressive disorder  F34.1 300.4   3. Encounter for screening for diabetes mellitus  Z13.1 V77.1   4. Trigger finger of right thumb  M65.311 727.03   5. Encounter for hepatitis C screening test for low risk patient  Z11.59 V73.89   6. Prediabetes  R73.03 790.29   7. Balance problem  R26.89 781.99   8. Peripheral vascular disease  I73.9 443.9   9. Mixed hyperlipidemia  E78.2 272.2   10. Screening for thyroid disorder  Z13.29 V77.0        Plan:     1. Mass of upper outer quadrant of right breast  -     Mammo Digital Diagnostic Bilat with Timbo; Future; Expected date: 08/19/2024  -     US Breast Bilateral Limited; Future; Expected date: 08/19/2024    2. Persistent depressive disorder  -     Ambulatory referral/consult to Psychiatry; Future; Expected date: 08/26/2024    4. Trigger finger of right thumb  -     Ambulatory referral/consult to Orthopedics; Future; Expected date: 08/26/2024    5. Encounter for hepatitis C screening test for low risk patient  -     Hepatitis C Antibody; Future; Expected date: 08/19/2024    6. Prediabetes  -     Hemoglobin A1C; Future; Expected date: 08/24/2024  -     Protein/Creatinine Ratio, Urine    7. Balance problem  -     MRI Brain Without Contrast; Future; Expected date: 08/19/2024  -     Vitamin B12; Future; Expected date: 08/19/2024  -     Folate; Future; Expected date: 08/19/2024  -     Ambulatory referral/consult to Physical/Occupational Therapy; Future; Expected date:  08/26/2024    8. Peripheral vascular disease  -     Comprehensive Metabolic Panel; Future; Expected date: 11/29/2024  -     Urinalysis; Future; Expected date: 11/29/2024    9. Mixed hyperlipidemia  -     Lipid Panel; Future; Expected date: 11/29/2024    10. Screening for thyroid disorder  -     TSH; Future; Expected date: 11/29/2024           Medication List with Changes/Refills   Current Medications    (MAGIC MOUTHWASH) 1:1 BENADRYL 12.5MG/5ML LIQ, MYLANTA, LIDOCAINE VISCOUS 2%, NYSTATIN 100,000U, PREDNISOLONE 15MG/5ML, DISTILLED WATER    Swish and swallow 480 mLs every 4 (four) hours as needed (pain).       Start Date: 11/29/2023End Date: --    ALBUTEROL (PROVENTIL/VENTOLIN HFA) 90 MCG/ACTUATION INHALER    Inhale 2 puffs into the lungs every 6 (six) hours as needed for Wheezing. Rescue       Start Date: 11/30/2023End Date: --    ATORVASTATIN (LIPITOR) 20 MG TABLET    Take 1 tablet (20 mg total) by mouth once daily.       Start Date: 2/27/2024 End Date: 2/26/2025    BUPROPION (WELLBUTRIN XL) 150 MG TB24 TABLET    Take 1 tablet (150 mg total) by mouth once daily.       Start Date: 4/24/2024 End Date: --    BUPROPION (WELLBUTRIN XL) 300 MG 24 HR TABLET    Take 300mg  + 150mg (450mg total) by mouth daily.       Start Date: 4/24/2024 End Date: --    CLONAZEPAM (KLONOPIN) 1 MG TABLET    Take 1 tablet (1 mg total) by mouth 3 (three) times daily as needed for Anxiety.       Start Date: 4/24/2024 End Date: --    CLOPIDOGREL (PLAVIX) 75 MG TABLET    Take 1 tablet (75 mg total) by mouth once daily.       Start Date: 4/30/2024 End Date: 4/30/2025    FINASTERIDE (PROSCAR) 5 MG TABLET    Take 5 mg by mouth once daily.       Start Date: --        End Date: --    KETOCONAZOLE (NIZORAL) 2 % SHAMPOO    Apply 120 mLs topically twice a week.       Start Date: 8/27/2023 End Date: --    LISDEXAMFETAMINE (VYVANSE) 70 MG CAPSULE    Take 1 capsule (70 mg total) by mouth every morning.       Start Date: 4/27/2024 End Date: --     LISDEXAMFETAMINE (VYVANSE) 70 MG CAPSULE    Take 1 capsule (70 mg total) by mouth every morning.       Start Date: 5/26/2024 End Date: --    MOMETASONE-FORMOTEROL (DULERA) 200-5 MCG/ACTUATION INHALER    Inhale 2 puffs into the lungs 2 (two) times daily. Controller       Start Date: --        End Date: --    OXYBUTYNIN (DITROPAN-XL) 10 MG 24 HR TABLET    Take 10 mg by mouth once daily.       Start Date: --        End Date: --    PANTOPRAZOLE (PROTONIX) 40 MG TABLET    Take 40 mg by mouth once daily.       Start Date: --        End Date: --    PRAMOXINE 1 % FOAM    Place rectally every 10 days.       Start Date: --        End Date: --    VILAZODONE (VIIBRYD) 20 MG TAB    Take 1 tablet (20 mg total) by mouth Daily.       Start Date: 4/24/2024 End Date: --          No follow-ups on file.   In addition to their scheduled follow up, the patient has also been instructed to follow up on as needed basis.     Future Appointments   Date Time Provider Department Center   12/3/2024 11:00 AM Tennille Conrad MD [facility]

## 2024-09-06 ENCOUNTER — APPOINTMENT (OUTPATIENT)
Dept: RADIOLOGY | Facility: HOSPITAL | Age: 64
End: 2024-09-06
Attending: STUDENT IN AN ORGANIZED HEALTH CARE EDUCATION/TRAINING PROGRAM
Payer: MEDICARE

## 2024-09-06 DIAGNOSIS — R26.89 BALANCE PROBLEM: ICD-10-CM

## 2024-09-06 PROCEDURE — 70551 MRI BRAIN STEM W/O DYE: CPT | Mod: TC

## 2024-09-10 ENCOUNTER — PATIENT MESSAGE (OUTPATIENT)
Dept: FAMILY MEDICINE | Facility: CLINIC | Age: 64
End: 2024-09-10
Payer: MEDICARE

## 2024-09-10 DIAGNOSIS — E78.2 MIXED HYPERLIPIDEMIA: Primary | ICD-10-CM

## 2024-09-10 RX ORDER — ATORVASTATIN CALCIUM 40 MG/1
40 TABLET, FILM COATED ORAL DAILY
Qty: 90 TABLET | Refills: 3 | Status: SHIPPED | OUTPATIENT
Start: 2024-09-10 | End: 2025-09-10

## 2024-09-10 NOTE — PROGRESS NOTES
Please inform patient of MRI results    Impression:  1.  No acute intracranial findings identified.  2.  Minimal chronic microvascular ischemia and mild atrophy.  Keep blood pressure less than 130/80, A1c less than 7, goal LDL <70   Increase statins to 40 mg        Thanks,    Dr. Conrad

## 2024-09-17 DIAGNOSIS — R73.03 PREDIABETES: ICD-10-CM

## 2024-09-17 DIAGNOSIS — E11.9 TYPE 2 DIABETES MELLITUS WITHOUT COMPLICATION, WITHOUT LONG-TERM CURRENT USE OF INSULIN: ICD-10-CM

## 2024-09-25 ENCOUNTER — TELEPHONE (OUTPATIENT)
Dept: FAMILY MEDICINE | Facility: CLINIC | Age: 64
End: 2024-09-25
Payer: MEDICARE

## 2024-09-25 NOTE — TELEPHONE ENCOUNTER
----- Message from Woody Servin sent at 9/25/2024  4:10 PM CDT -----  Regarding: referral  Who Called: Selena Cunningham    Does the patient already have the specialty appointment scheduled?: no    If yes, what is the date of that appointment?:    Referral to What Specialty: Infectious Disease     Reason for Referral:    Does the patient want the referral with a specific physician?:no    If yes, which provider?: Dr. Chelsi Walsh     [facility]    Preferred Method of Contact: Phone Call  Patient's Preferred Phone Number on File: [phone]   Best Call Back Number, if different:    Additional Information:  Pt is requesting a referral to ID, pt stated she got bit by bugs in her home that has caused her health to decrease.

## 2024-10-01 ENCOUNTER — OFFICE VISIT (OUTPATIENT)
Dept: FAMILY MEDICINE | Facility: CLINIC | Age: 64
End: 2024-10-01
Payer: MEDICARE

## 2024-10-01 VITALS — BODY MASS INDEX: 30.65 KG/M2 | WEIGHT: 173 LBS | HEIGHT: 63 IN

## 2024-10-01 DIAGNOSIS — E11.9 TYPE 2 DIABETES MELLITUS WITHOUT COMPLICATION, WITHOUT LONG-TERM CURRENT USE OF INSULIN: ICD-10-CM

## 2024-10-01 DIAGNOSIS — T78.40XA ALLERGY, INITIAL ENCOUNTER: ICD-10-CM
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
## 2024-10-01 PROBLEM — F41.9 ANXIETY DISORDER: Status: ACTIVE | Noted: 2024-10-01

## 2024-10-01 PROBLEM — R10.13 EPIGASTRIC PAIN: Status: ACTIVE | Noted: 2024-10-01

## 2024-10-01 PROBLEM — F41.1 GENERALIZED ANXIETY DISORDER: Status: ACTIVE | Noted: 2024-10-01

## 2024-10-01 PROBLEM — F32.9 MAJOR DEPRESSIVE DISORDER, SINGLE EPISODE, UNSPECIFIED: Status: ACTIVE | Noted: 2024-10-01

## 2024-10-01 PROCEDURE — 1101F PT FALLS ASSESS-DOCD LE1/YR: CPT | Mod: CPTII,95,, | Performed by: STUDENT IN AN ORGANIZED HEALTH CARE EDUCATION/TRAINING PROGRAM

## 2024-10-01 PROCEDURE — 3288F FALL RISK ASSESSMENT DOCD: CPT | Mod: CPTII,95,, | Performed by: STUDENT IN AN ORGANIZED HEALTH CARE EDUCATION/TRAINING PROGRAM

## 2024-10-01 PROCEDURE — 3008F BODY MASS INDEX DOCD: CPT | Mod: CPTII,95,, | Performed by: STUDENT IN AN ORGANIZED HEALTH CARE EDUCATION/TRAINING PROGRAM

## 2024-10-01 PROCEDURE — 1159F MED LIST DOCD IN RCRD: CPT | Mod: CPTII,95,, | Performed by: STUDENT IN AN ORGANIZED HEALTH CARE EDUCATION/TRAINING PROGRAM

## 2024-10-01 PROCEDURE — 3044F HG A1C LEVEL LT 7.0%: CPT | Mod: CPTII,95,, | Performed by: STUDENT IN AN ORGANIZED HEALTH CARE EDUCATION/TRAINING PROGRAM

## 2024-10-01 PROCEDURE — 1160F RVW MEDS BY RX/DR IN RCRD: CPT | Mod: CPTII,95,, | Performed by: STUDENT IN AN ORGANIZED HEALTH CARE EDUCATION/TRAINING PROGRAM

## 2024-10-01 PROCEDURE — 99214 OFFICE O/P EST MOD 30 MIN: CPT | Mod: 95,,, | Performed by: STUDENT IN AN ORGANIZED HEALTH CARE EDUCATION/TRAINING PROGRAM

## 2024-10-01 RX ORDER — HYDROXYZINE HYDROCHLORIDE 25 MG/1
25 TABLET, FILM COATED ORAL DAILY PRN
Qty: 30 TABLET | Refills: 0 | Status: SHIPPED | OUTPATIENT
Start: 2024-10-01 | End: 2024-10-31

## 2024-10-01 RX ORDER — DOXYCYCLINE 100 MG/1
100 CAPSULE ORAL EVERY 12 HOURS
Qty: 14 CAPSULE | Refills: 0 | Status: SHIPPED | OUTPATIENT
Start: 2024-10-01 | End: 2024-10-08

## 2024-10-01 RX ORDER — MINERAL OIL
180 ENEMA (ML) RECTAL DAILY
COMMUNITY

## 2024-10-01 NOTE — PROGRESS NOTES
Patient ID: [id number]     Chief Complaint: Follow-up      HPI:     This is a telemedicine note. Patient was treated using telemedicine, real time audio and video, according to [facility] protocols. I, Tennille Conrad MD, conducted the visit from the [facility]. The patient participated in the visit at a non-[facility] location selected by the patient, identified below. I am licensed in the state where the patient stated they are located. The patient stated that they understood and accepted the privacy and security risks to their information at their location. This visit is not recorded.    Patient was located at the patient's home.       Selena Cunningham is a 64 y.o. female here today for a telemedicine visit.     Patient reports she has been living in a difficult situation in a trailer where she had water logging since multiple days.  She and her son has been having multiple bug bites which is diffuse all over the body and is itchy.  She is requesting for an ID referral.  Denies of any fever, shortness of breath, chest pain, lymph node swelling, dizziness, palpitations, falls, abdominal pain, nausea or vomiting.  Requesting for an allergy referral as she missed her appointment.  Diabetes well-controlled.  Is due for blood work    -------------------------------------    Aphthous ulcer    Asthma    Constipation    Dysphagia    Epigastric pain    Fatigue    Fatigue    Folate deficiency    GERD (gastroesophageal reflux disease)    Macrocytosis    Obesity, unspecified        Past Surgical History:   Procedure Laterality Date    ADENOIDECTOMY      Child    APPENDECTOMY      CATARACT EXTRACTION Bilateral 04/2023    CHOLECYSTECTOMY  2023    COLONOSCOPY N/A 05/11/2022    Procedure: COLONOSCOPY;  Surgeon: Damion Martinez MD;  Location: [facility] ENDOSCOPY;  Service: Gastroenterology;  Laterality: N/A;    EYE SURGERY  2022 2023 2024    Cadric left cadric right remove ajit left remove growth same jana graph  did not take problem with 2nd graph  wants to again  will see another doctor    HYSTERECTOMY      LAPAROSCOPIC CHOLECYSTECTOMY      NASAL SINUS SURGERY      TONSILLECTOMY         Review of patient's allergies indicates:   Allergen Reactions    Pcn [penicillins] Rash       Current Outpatient Medications   Medication Instructions    (Magic mouthwash) 1:1 Benadryl 12.5mg/5ml liq, mylanta, LIDOcaine viscous 2%, nystatin 100,000u, prednisolone 15mg/5mL, distilled water 480 mLs, Swish & Swallow, Every 4 hours PRN    albuterol (PROVENTIL/VENTOLIN HFA) 90 mcg/actuation inhaler 2 puffs, Inhalation, Every 6 hours PRN, Rescue    atorvastatin (LIPITOR) 40 mg, Oral, Daily    buPROPion (WELLBUTRIN XL) 300 MG 24 hr tablet Take 300mg  + 150mg (450mg total) by mouth daily.    buPROPion (WELLBUTRIN XL) 150 mg, Oral, Daily    clonazePAM (KLONOPIN) 1 mg, Oral, 3 times daily PRN    clopidogreL (PLAVIX) 75 mg, Oral, Daily    finasteride (PROSCAR) 5 mg, Daily    ketoconazole (NIZORAL) 2 % shampoo 120 mLs, Twice weekly    lisdexamfetamine (VYVANSE) 70 mg, Oral, Every morning    lisdexamfetamine (VYVANSE) 70 mg, Oral, Every morning    mometasone-formoterol (DULERA) 200-5 mcg/actuation inhaler 2 puffs, 2 times daily    oxybutynin (DITROPAN-XL) 10 mg, Daily    pantoprazole (PROTONIX) 40 mg, Daily    pramoxine 1 % Foam Every 10 days    vilazodone (VIIBRYD) 20 mg, Oral, Daily       Social History     Socioeconomic History    Marital status:     Number of children: 2   Tobacco Use    Smoking status: Former     Current packs/day: 0.50     Average packs/day: 0.5 packs/day for 5.0 years (2.5 ttl pk-yrs)     Types: Cigarettes     Passive exposure: Past    Smokeless tobacco: Never    Tobacco comments:     Not sure of dates 16 to 24 quit  2 or 3 times for 6 months   Substance and Sexual Activity    Alcohol use: No    Drug use: No    Sexual activity: Not Currently     Partners: Male     Birth control/protection: Condom,  Partner-Vasectomy, None     Comment: Pill short time     Social Drivers of Health     Financial Resource Strain: Medium Risk (9/30/2024)    Overall Financial Resource Strain (CARDIA)     Difficulty of Paying Living Expenses: Somewhat hard   Food Insecurity: Food Insecurity Present (9/30/2024)    Hunger Vital Sign     Worried About Running Out of Food in the Last Year: Often true     Ran Out of Food in the Last Year: Sometimes true   Physical Activity: Sufficiently Active (9/30/2024)    Exercise Vital Sign     Days of Exercise per Week: 5 days     Minutes of Exercise per Session: 60 min   Stress: Stress Concern Present (9/30/2024)    [employer] - Occupational Stress Questionnaire     Feeling of Stress : Rather much   Housing Stability: Unknown (9/30/2024)    Housing Stability Vital Sign     Unable to Pay for Housing in the Last Year: No        Family History   Problem Relation Name Age of Onset    Drug abuse Brother Micah & Walt Ragland     Alcohol abuse Brother Micah & Waltniki Burtons     Asthma Mother Shazia Debbie Ragland     Diabetes Mother Shazia Debbie Ragland     Kidney disease Mother Shazia Debbie Ragland     Early death Father Alex Ragland     Heart disease Father Alex Ragland     Stroke Paternal Grandfather Alex Debbie     Cancer Paternal Aunt Eleonora Rosalesux     Drug abuse Brother Micah Ragland     Early death Brother Micah Ragland     Heart disease Brother Micah Ragland     Drug abuse Brother Walt Ragland     Early death Brother Walt Ragland     Heart disease Brother Walt Ragland     Heart disease Maternal Uncle Elfego Ragland     Learning disabilities Son Marcellus Octavio     Mental illness Son Marcellus Octavio     Mental illness Daughter Enid Cunningham         Patient Care Team:  Tennille Conrad MD as PCP - General (Family Medicine)  Ruma Bojorquez MD as Consulting Physician (Ophthalmology)      Subjective:       ROS    See HPI for details    Constitutional: Denies Change in appetite. Denies Chills. Denies  "Fever. Denies Night sweats.  Eye: Denies Blurred vision. Denies Discharge. Denies Eye pain.  ENT: Denies Decreased hearing. Denies Sore throat. Denies Swollen glands.  Respiratory: Denies Cough. Denies Shortness of breath. Denies Shortness of breath with exertion. Denies Wheezing.  Cardiovascular: DeniesChest pain at rest. Denies Chest pain with exertion. Denies Irregular heartbeat. Denies Palpitations. Denies Edema.  Gastrointestinal: Denies Abdominal pain. DeniesDiarrhea. Denies Nausea. Denies Vomiting. Denies Hematemesis or Hematochezia.  Genitourinary: Denies Dysuria. Denies Urinary frequency. Denies Urinary urgency. Denies Blood in urine.  Endocrine: Denies Cold intolerance. Denies Excessive thirst. Denies Heat intolerance. Denies Weight loss. Denies Weight gain.  Musculoskeletal: Denies Painful joints. Denies Weakness.  Integumentary: Denies Rash. Denies Itching. Denies Dry skin.  Neurologic: Denies Dizziness. Denies Fainting. Denies Headache.  Psychiatric: Denies Depression. Denies Anxiety. Denies Suicidal/Homicidal ideations.    All Other ROS: Negative except as stated in HPI.       Objective:     Visit Vitals  Ht 5' 3" (1.6 m)   Wt 78.5 kg (173 lb)   BMI 30.65 kg/m²       Physical Exam    Physical Exam: LIMITED DUE TO TELEMEDICINE RESTRICTIONS.  General: Alert and oriented, No acute distress.  Head: Normocephalic, Atraumatic.  Eye: Sclera non-icteric.  Neck/Thyroid:  Full range of motion.  Respiratory: Non-labored respirations, Symmetrical chest wall expansion.  Musculoskeletal: Normal range of motion.  Integumentary:  No visible suspicious lesions or rashes. No diaphoresis.   Neurologic: No focal deficits  Psychiatric: Normal interaction, Coherent speech, Euthymic mood, Appropriate affect       Assessment:       ICD-10-CM ICD-9-CM   1. Allergy, initial encounter  T78.40XA 995.3   2. Type 2 diabetes mellitus without complication, without long-term current use of insulin  E11.9 250.00   3. Bug bite, initial " encounter  W57.XXXA 919.4        Plan:     1. Allergy, initial encounter  -     Ambulatory referral/consult to Allergy; Future; Expected date: 10/08/2024  Continue Flonase and hydroxyzine    2. Type 2 diabetes mellitus without complication, without long-term current use of insulin  A1c ordered   Diabetic eye exam due   Urine microalbuminuria   Continue diabetic diet with 35 minutes of brisk walking    3. Bug bite, initial encounter  -     Ambulatory referral/consult to Infectious Disease; Future; Expected date: 10/08/2024  -     doxycycline (MONODOX) 100 MG capsule; Take 1 capsule (100 mg total) by mouth every 12 (twelve) hours. for 7 days  Dispense: 14 capsule; Refill: 0  -     hydrOXYzine HCL (ATARAX) 25 MG tablet; Take 1 tablet (25 mg total) by mouth daily as needed for Itching.  Dispense: 30 tablet; Refill: 0           Medication List with Changes/Refills   Current Medications    (MAGIC MOUTHWASH) 1:1 BENADRYL 12.5MG/5ML LIQ, MYLANTA, LIDOCAINE VISCOUS 2%, NYSTATIN 100,000U, PREDNISOLONE 15MG/5ML, DISTILLED WATER    Swish and swallow 480 mLs every 4 (four) hours as needed (pain).       Start Date: 11/29/2023End Date: --    ALBUTEROL (PROVENTIL/VENTOLIN HFA) 90 MCG/ACTUATION INHALER    Inhale 2 puffs into the lungs every 6 (six) hours as needed for Wheezing. Rescue       Start Date: 11/30/2023End Date: --    ATORVASTATIN (LIPITOR) 40 MG TABLET    Take 1 tablet (40 mg total) by mouth once daily.       Start Date: 9/10/2024 End Date: 9/10/2025    BUPROPION (WELLBUTRIN XL) 150 MG TB24 TABLET    Take 1 tablet (150 mg total) by mouth once daily.       Start Date: 4/24/2024 End Date: --    BUPROPION (WELLBUTRIN XL) 300 MG 24 HR TABLET    Take 300mg  + 150mg (450mg total) by mouth daily.       Start Date: 4/24/2024 End Date: --    CLONAZEPAM (KLONOPIN) 1 MG TABLET    Take 1 tablet (1 mg total) by mouth 3 (three) times daily as needed for Anxiety.       Start Date: 4/24/2024 End Date: --    CLOPIDOGREL (PLAVIX) 75 MG  TABLET    Take 1 tablet (75 mg total) by mouth once daily.       Start Date: 4/30/2024 End Date: 4/30/2025    FINASTERIDE (PROSCAR) 5 MG TABLET    Take 5 mg by mouth once daily.       Start Date: --        End Date: --    KETOCONAZOLE (NIZORAL) 2 % SHAMPOO    Apply 120 mLs topically twice a week.       Start Date: 8/27/2023 End Date: --    LISDEXAMFETAMINE (VYVANSE) 70 MG CAPSULE    Take 1 capsule (70 mg total) by mouth every morning.       Start Date: 4/27/2024 End Date: --    LISDEXAMFETAMINE (VYVANSE) 70 MG CAPSULE    Take 1 capsule (70 mg total) by mouth every morning.       Start Date: 5/26/2024 End Date: --    MOMETASONE-FORMOTEROL (DULERA) 200-5 MCG/ACTUATION INHALER    Inhale 2 puffs into the lungs 2 (two) times daily. Controller       Start Date: --        End Date: --    OXYBUTYNIN (DITROPAN-XL) 10 MG 24 HR TABLET    Take 10 mg by mouth once daily.       Start Date: --        End Date: --    PANTOPRAZOLE (PROTONIX) 40 MG TABLET    Take 40 mg by mouth once daily.       Start Date: --        End Date: --    PRAMOXINE 1 % FOAM    Place rectally every 10 days.       Start Date: --        End Date: --    VILAZODONE (VIIBRYD) 20 MG TAB    Take 1 tablet (20 mg total) by mouth Daily.       Start Date: 4/24/2024 End Date: --          Follow up for Follow Up, Nurse Visit A1c and DM eye exam this Friday.  Annual wellness after 11/29/24. In addition to their scheduled follow up, the patient has also been instructed to follow up on as needed basis.       Future Appointments   Date Time Provider Department Center   10/24/2024  8:00 AM Bird Ruiz MD [facility] [place]   12[facility]3/2024 [facility] [place]        Answers submitted by the patient for this visit:  Review of Systems Questionnaire (Submitted on 10/1/2024)  activity change: No  unexpected weight change: No  rhinorrhea: No  trouble swallowing: Yes  visual disturbance: No  chest tightness: No  polyuria: No  difficulty  urinating: No  menstrual problem: No  joint swelling: No  arthralgias: No  confusion: No  dysphoric mood: Yes

## 2024-10-09 ENCOUNTER — TELEPHONE (OUTPATIENT)
Dept: FAMILY MEDICINE | Facility: CLINIC | Age: 64
End: 2024-10-09
Payer: MEDICARE

## 2024-10-09 DIAGNOSIS — T78.40XA ALLERGY, INITIAL ENCOUNTER: Primary | ICD-10-CM

## 2024-10-09 NOTE — TELEPHONE ENCOUNTER
----- Message from Paige sent at 10/9/2024 11:42 AM CDT -----  Regarding: RE: Referral Update  Spoke w/ Dr Rodriguez office again- they contacted pt and scheduled her in Feb, pt agreed to appointment. I informed her if she still would be sent elsewhere would have pt cancel appt in Feb.  ----- Message -----  From: Bouchra Bonner MA  Sent: 10/9/2024  11:40 AM CDT  To: Paige Leo  Subject: RE: Referral Update                              Got it! Thank you ma'am! We'll go ahead and refer her somewhere else.  ----- Message -----  From: Paige Leo  Sent: 10/9/2024  11:28 AM CDT  To: [place] [facility] Staff  Subject: Referral Update                                  Good morning, I reached out to Dr Swanson's office to check status of her referral.   I was advised that patient no showed her initial NP appt in Feb, per their policy she will not be able to schedule another NP appt until Feb 2025. They will attempt to contact her again today to see if she wants to wait and I advised them I would reach out to your office to see if yall wanted to refer her elsewhere or ok for her to wait until Feb.     Thank you,     Paige Leo  Access Navigator  [facility]   Referral Scheduling Department

## 2024-10-09 NOTE — TELEPHONE ENCOUNTER
----- Message from Paige sent at 10/9/2024 11:26 AM CDT -----  Regarding: Referral Update  Good morning, I reached out to Dr Swanson's office to check status of her referral.   I was advised that patient no showed her initial NP appt in Feb, per their policy she will not be able to schedule another NP appt until Feb 2025. They will attempt to contact her again today to see if she wants to wait and I advised them I would reach out to your office to see if yall wanted to refer her elsewhere or ok for her to wait until Feb.     Thank you,     Paige Leo  Access Navigator  [facility]   Referral Scheduling Department

## 2024-10-25 DIAGNOSIS — I25.10 CORONARY ARTERY DISEASE, UNSPECIFIED VESSEL OR LESION TYPE, UNSPECIFIED WHETHER ANGINA PRESENT, UNSPECIFIED WHETHER NATIVE OR TRANSPLANTED HEART: ICD-10-CM

## 2024-10-25 RX ORDER — CLOPIDOGREL BISULFATE 75 MG/1
75 TABLET ORAL DAILY
Qty: 90 TABLET | Refills: 3 | Status: SHIPPED | OUTPATIENT
Start: 2024-10-25 | End: 2025-10-25

## 2024-12-04 ENCOUNTER — OFFICE VISIT (OUTPATIENT)
Dept: FAMILY MEDICINE | Facility: CLINIC | Age: 64
End: 2024-12-04
Payer: MEDICARE

## 2024-12-04 VITALS
HEART RATE: 91 BPM | TEMPERATURE: 98 F | RESPIRATION RATE: 18 BRPM | OXYGEN SATURATION: 98 % | BODY MASS INDEX: 32.25 KG/M2 | HEIGHT: 63 IN | WEIGHT: 182 LBS | SYSTOLIC BLOOD PRESSURE: 122 MMHG | DIASTOLIC BLOOD PRESSURE: 79 MMHG

## 2024-12-04 DIAGNOSIS — F32.0 CURRENT MILD EPISODE OF MAJOR DEPRESSIVE DISORDER WITHOUT PRIOR EPISODE: ICD-10-CM

## 2024-12-04 DIAGNOSIS — J06.9 UPPER RESPIRATORY TRACT INFECTION, UNSPECIFIED TYPE: ICD-10-CM

## 2024-12-04 DIAGNOSIS — R05.1 ACUTE COUGH: ICD-10-CM

## 2024-12-04 DIAGNOSIS — K12.0 APHTHOUS ULCER OF MOUTH: ICD-10-CM

## 2024-12-04 DIAGNOSIS — Z00.00 WELLNESS EXAMINATION: ICD-10-CM

## 2024-12-04 DIAGNOSIS — F41.1 GENERALIZED ANXIETY DISORDER: ICD-10-CM

## 2024-12-04 DIAGNOSIS — J45.20 MILD INTERMITTENT ASTHMA WITHOUT COMPLICATION: ICD-10-CM

## 2024-12-04 DIAGNOSIS — E11.9 TYPE 2 DIABETES MELLITUS WITHOUT COMPLICATION, WITHOUT LONG-TERM CURRENT USE OF INSULIN: ICD-10-CM

## 2024-12-04 DIAGNOSIS — F90.0 ATTENTION DEFICIT HYPERACTIVITY DISORDER (ADHD), PREDOMINANTLY INATTENTIVE TYPE: ICD-10-CM

## 2024-12-04 DIAGNOSIS — K21.9 GASTROESOPHAGEAL REFLUX DISEASE WITHOUT ESOPHAGITIS: ICD-10-CM

## 2024-12-04 RX ORDER — PREDNISONE 20 MG/1
20 TABLET ORAL 2 TIMES DAILY
Qty: 10 TABLET | Refills: 0 | Status: SHIPPED | OUTPATIENT
Start: 2024-12-04 | End: 2024-12-09

## 2024-12-04 RX ORDER — PROMETHAZINE HYDROCHLORIDE AND DEXTROMETHORPHAN HYDROBROMIDE 6.25; 15 MG/5ML; MG/5ML
5 SYRUP ORAL EVERY 8 HOURS PRN
Qty: 118 ML | Refills: 0 | Status: SHIPPED | OUTPATIENT
Start: 2024-12-04 | End: 2024-12-14

## 2024-12-04 RX ORDER — FLUTICASONE PROPIONATE 50 MCG
1 SPRAY, SUSPENSION (ML) NASAL DAILY
Qty: 16 ML | Refills: 11 | Status: SHIPPED | OUTPATIENT
Start: 2024-12-04 | End: 2024-12-18

## 2024-12-04 RX ORDER — LORATADINE 10 MG/1
10 TABLET ORAL DAILY
Qty: 30 TABLET | Refills: 0 | Status: SHIPPED | OUTPATIENT
Start: 2024-12-04 | End: 2025-01-03

## 2024-12-04 NOTE — PROGRESS NOTES
Patient ID: [id number]     Chief Complaint: Annual Exam        HPI:      Disclaimer:  This note is prepared using voice recognition software and as such is likely to have errors despite attempts at proofreading. Please contact me for questions.     Selena Cunningham is a 64 y.o. female here today for a Medicare Wellness.     A separate E/M code has been provided to evaluate additional complaints that the patient would like addressed during the dedicated Medicare Wellness Exam.    The patient has following issues to discuss    Acute Issues-  Cough with upper respiratory tract infection symptoms   Reports of having dry cough, associated with rhinorrhea.  Denies of any fever, chest pain, shortness for breath, dizziness, lower extremity edema    Chronic  Depression   Generalized anxiety disorder   Currently seeing NP  but would like to change his psychiatrist and needs mental health counseling for depression. Waiting on appointment    Denies suicidal/homicidal symptoms   Currently on Wellbutrin and Trintellix per her psychiatrist     ADHD (attention deficit hyperactivity disorder)  Adderall x 30 mg x bid and is stable     Family history of sudden cardiac death  Father  at age 48   ASx  Reports of having her brothers , sisters and are not having coronary artery disease at earlier age  Cards referral sent      Hypothyroidism   Patient reports that she is off of her medication and is asymptomatic  Needs a new TSH and T4     Asthma  Well controlled on Ventolin  Never been hospitalized or intubated.     Aphthous ulcer  Saw Dr. Cisneros     GERD (gastroesophageal reflux disease)  On protonix     Insomnia    DM  Protective Sensation (w/ 10 gram monofilament):  Right: Intact  Left: Intact    Visual Inspection:  Normal -  Bilateral    Pedal Pulses:   Right: Present  Left: Present    Posterior Tibialis Pulses:   Right:Present  Left: Present   Chronic Issues-  -------------------------------------    Aphthous ulcer    Asthma     Constipation    Dysphagia    Epigastric pain    Fatigue    Fatigue    Folate deficiency    GERD (gastroesophageal reflux disease)    Macrocytosis    Obesity, unspecified          Past Surgical History:   Procedure Laterality Date    ADENOIDECTOMY      Child    APPENDECTOMY      CATARACT EXTRACTION Bilateral 04/2023    CHOLECYSTECTOMY  2023    COLONOSCOPY N/A 05/11/2022    Procedure: COLONOSCOPY;  Surgeon: Damion Martinez MD;  Location: [facility] ENDOSCOPY;  Service: Gastroenterology;  Laterality: N/A;    EYE SURGERY  2022 2023 2024    Cadric left cadric right remove ajit left remove growth same jana graph did not take problem with 2nd graph  wants to again  will see another doctor    HYSTERECTOMY      LAPAROSCOPIC CHOLECYSTECTOMY      NASAL SINUS SURGERY      TONSILLECTOMY         Review of patient's allergies indicates:   Allergen Reactions    Pcn [penicillins] Rash       Current Outpatient Medications   Medication Instructions    (Magic mouthwash) 1:1 Benadryl 12.5mg/5ml liq, mylanta, LIDOcaine viscous 2%, nystatin 100,000u, prednisolone 15mg/5mL, distilled water 480 mLs, Swish & Swallow, Every 4 hours PRN    albuterol (PROVENTIL/VENTOLIN HFA) 90 mcg/actuation inhaler 2 puffs, Inhalation, Every 6 hours PRN, Rescue    atorvastatin (LIPITOR) 40 mg, Oral, Daily    buPROPion (WELLBUTRIN XL) 300 MG 24 hr tablet Take 300mg  + 150mg (450mg total) by mouth daily.    buPROPion (WELLBUTRIN XL) 150 mg, Oral, Daily    clonazePAM (KLONOPIN) 1 mg, Oral, 3 times daily PRN    clopidogreL (PLAVIX) 75 mg, Oral, Daily    fexofenadine (ALLEGRA ALLERGY) 180 mg, Daily    finasteride (PROSCAR) 5 mg, Daily    ketoconazole (NIZORAL) 2 % shampoo 120 mLs, Twice weekly    lisdexamfetamine (VYVANSE) 70 mg, Oral, Every morning    lisdexamfetamine (VYVANSE) 70 mg, Oral, Every morning    mometasone-formoterol (DULERA) 200-5 mcg/actuation inhaler 2 puffs, 2 times daily    oxybutynin (DITROPAN-XL) 10 mg, Daily    pantoprazole  (PROTONIX) 40 mg, Daily    pramoxine 1 % Foam Every 10 days    vilazodone (VIIBRYD) 20 mg, Oral, Daily       Social History     Socioeconomic History    Marital status:     Number of children: 2   Tobacco Use    Smoking status: Former     Current packs/day: 0.50     Average packs/day: 0.5 packs/day for 5.0 years (2.5 ttl pk-yrs)     Types: Cigarettes     Passive exposure: Past    Smokeless tobacco: Never    Tobacco comments:     Not sure of dates 16 to 24 quit  2 or 3 times for 6 months   Substance and Sexual Activity    Alcohol use: No    Drug use: No    Sexual activity: Not Currently     Partners: Male     Birth control/protection: Condom, Partner-Vasectomy, None     Comment: Pill short time     Social Drivers of Health     Financial Resource Strain: Medium Risk (9/30/2024)    Overall Financial Resource Strain (CARDIA)     Difficulty of Paying Living Expenses: Somewhat hard   Food Insecurity: Food Insecurity Present (9/30/2024)    Hunger Vital Sign     Worried About Running Out of Food in the Last Year: Often true     Ran Out of Food in the Last Year: Sometimes true   Physical Activity: Sufficiently Active (9/30/2024)    Exercise Vital Sign     Days of Exercise per Week: 5 days     Minutes of Exercise per Session: 60 min   Stress: Stress Concern Present (9/30/2024)    [employer] [place] [employer] - Occupational Stress Questionnaire     Feeling of Stress : Rather much   Housing Stability: Unknown (9/30/2024)    Housing Stability Vital Sign     Unable to Pay for Housing in the Last Year: No        Family History   Problem Relation Name Age of Onset    Drug abuse Brother Micah & Walt Ragland     Alcohol abuse Brother Micah & Walt Ragland     Asthma Mother Shazia Debbie Ragland     Diabetes Mother Shazia Debbie Ragland     Kidney disease Mother Shazia Debbie Ragland     Early death Father Alex Ragland     Heart disease Father Alex Ragland     Stroke Paternal Grandfather Alex Debbie     Cancer Paternal  "Aunt Eleonora Bocanegra     Drug abuse Brother Micah Ragland     Early death Brother Micah Ragland     Heart disease Brother Micah Ragland     Drug abuse Brother Walt Ragland     Early death Brother Walt Ragland     Heart disease Brother Walt Ragland     Heart disease Maternal Uncle Elfego Ragland     Learning disabilities Son Marcellus Cunningham     Mental illness Son Marcellus Cunningham     Mental illness Daughter Enid Cunningham         Patient Care Team:  Tennille Conrad MD as PCP - General (Family Medicine)  Ruma Bojorquez MD as Consulting Physician (Ophthalmology)       Subjective:     ROS    See HPI for details    Constitutional: Denies Change in appetite. Denies Chills. Denies Fever. Denies Night sweats.  Respiratory: Denies Cough. Denies Shortness of breath. Denies Shortness of breath with exertion. Denies Wheezing.  Cardiovascular: DeniesChest pain at rest. Denies Chest pain with exertion. Denies Irregular heartbeat. Denies Palpitations. Denies Edema.  Gastrointestinal: Denies Abdominal pain. DeniesDiarrhea. Denies Nausea. Denies Vomiting. Denies Hematemesis or Hematochezia.  Integumentary: Denies Rash. Denies Itching. Denies Dry skin.  Neurologic: Denies Dizziness. Denies Fainting. Denies Headache.  Psychiatric: Denies Depression. Denies Anxiety. Denies Suicidal/Homicidal ideations.    All Other ROS: Negative except as stated in HPI.     Objective:     Visit Vitals  /79 (BP Location: Right arm, Patient Position: Sitting)   Pulse 91   Temp 98.2 °F (36.8 °C) (Oral)   Resp 18   Ht 5' 3" (1.6 m)   Wt 82.6 kg (182 lb)   SpO2 98%   BMI 32.24 kg/m²       Physical Exam    General: Alert and oriented, No acute distress.  Neck/Thyroid: Supple, Non-tender  Respiratory: Clear to auscultation bilaterally  Cardiovascular: Regular rate and rhythm  Gastrointestinal: Soft, Non-tender. No palpable organomegaly.  Musculoskeletal: Normal range of motion.  Neurologic: No focal deficits  Psychiatric: Normal interaction, Coherent speech, Euthymic " mood, Appropriate affect       Assessment:       ICD-10-CM ICD-9-CM   1. Wellness examination  Z00.00 V70.0   2. Current mild episode of major depressive disorder without prior episode  F32.0 296.21   3. Generalized anxiety disorder  F41.1 300.02   4. Aphthous ulcer of mouth  K12.0 528.2   5. Mild intermittent asthma without complication  J45.20 493.90   6. Gastroesophageal reflux disease without esophagitis  K21.9 530.81   7. Attention deficit hyperactivity disorder (ADHD), predominantly inattentive type  F90.0 314.00   8. Type 2 diabetes mellitus without complication, without long-term current use of insulin  E11.9 250.00   9. Acute cough  R05.1 786.2   10. Upper respiratory tract infection, unspecified type  J06.9 465.9        Plan:     Medicare Annual Wellness and Personalized Prevention Plan:     A comprehensive HEALTH RISK ASSESSMENT was completed today. Results are summarized below:                  The patient is NOT A TOBACCO USER.  The patient reports NO SIGNIFICANT ALCOHOL USE.     All Questions regarding food, transportation or housing were not answered today.    The patient was asked and declined the use of a free .    Alcohol/Tobacco Use - Stressed importance of smoking cessation and limiting alcohol intake.  Denies of smoking and alcohol intake  CVD Risk Factors - Reviewed as above @  Obesity/Physical Activity - Body mass index is 32.24 kg/m².. Encouraged daily 30 minute physical activity x 5 days per week.    Advance Care Planning  Advance Care Planning   I attest to discussing Advance Care Planning with patient and/or family member.  Education was provided including the importance of the Health Care Power of , Advance Directives, and/or [place] documentation.  The patient expressed understanding to the importance of this information and discussion.  Goals of Care: The patient expressed that what is most important right now is to focus on:   Length of ACP conversation in minutes: 6  min       Advance Care Planning     Date: 12/04/2024    [facility]  I engaged the patient in a voluntary conversation about advance care planning and we specifically addressed what the goals of care would be moving forward, in light of the patient's change in clinical status, specifically when in hospital .  We did specifically address the patient's likely prognosis, which is good .  We explored the patient's values and preferences for future care.  The patient endorses that what is most important right now is to focus on spending time at home    Accordingly, we have decided that the best plan to meet the patient's goals includes continuing with treatment    A total of 7 min was spent on advance care planning, goals of care discussion, emotional support, formulating and communicating prognosis and exploring burden/benefit of various approaches of treatment. This discussion occurred on a fully voluntary basis with the verbal consent of the patient and/or family.      Full code  Mr. Marcellus Cunningham. ( Son )  [phone] (Mobile )    Health Maintenance         Date Due Completion Date    Hepatitis C Screening Never done ---    Diabetes Urine Screening Never done ---    RSV Vaccine (Age 60+ and Pregnant patients) (1 - Risk 60-74 years 1-dose series) Never done ---    Hemoglobin A1c 08/23/2024 2/23/2024    Lipid Panel 09/20/2024 9/20/2023    Mammogram 10/19/2024 10/19/2023    Pneumococcal Vaccines (Age 65+) (2 of 2 - PCV) 12/02/2024 12/2/2023    Eye Exam 01/03/2025 1/3/2024    Foot Exam 02/01/2025 2/1/2024    Override on 2/1/2024: Done    Shingles Vaccine (1 of 2) 02/27/2025 (Originally 5/28/1979) ---    COVID-19 Vaccine (4 - 2024-25 season) 10/01/2025 (Originally 9/1/2024) 1/10/2023    Low Dose Statin 10/01/2025 10/1/2024    DEXA Scan 10/05/2026 10/5/2023    Colorectal Cancer Screening 05/11/2027 5/11/2022    TETANUS VACCINE 11/23/2031 11/23/2021          Vaccinations -   Immunization History   Administered Date(s) Administered     COVID-19, MRNA, LN-S, PF (MODERNA FULL 0.5 ML DOSE) 11/05/2021    COVID-19, mRNA, LNP-S, bivalent booster, PF (PFIZER OMICRON) 01/10/2023    COVID-19, vector-nr, rS-Ad26, PF (Scryer) 03/09/2021    Influenza - Quadrivalent - High Dose - PF (65 years and older) 09/22/2023    Influenza - Quadrivalent - PF *Preferred* (6 months and older) 11/15/2017, 09/21/2021    Influenza - Trivalent - Afluria, Fluzone MDV 10/01/2023    Influenza - Trivalent - Fluarix, Flulaval, Fluzone, Afluria - PF 09/21/2021    Pneumococcal Polysaccharide - 23 Valent 12/02/2023    Tdap 11/23/2021        1. Wellness examination  Lung Cancer-(50-80) Smoker/ Ex smoker- non smoker  Colon Cancer Screening(45-75) - Colonoscopy in 2022, due in 5 years by Dr. Martniez   Cervical Cancer Screening (21-65)- S/p  JACKIE with BSO  Breast Cancer Screening (40-74)- Last Mammogram-Normal.   Osteoporosis Screening(>65) - Last DEXA wnl  -due in 2026  Eye Exam - Last Eye Exam   Dental Exam - Recommend biannually    2. Current mild episode of major depressive disorder without prior episode  3. Generalized anxiety disorder  Keep scheduled follow up with Psychiatry   Continue to monitor         4. Aphthous ulcer of mouth  Resolved    5. Mild intermittent asthma without complication  Continue inhalers as prescribed   ER precautions provided    6. Gastroesophageal reflux disease without esophagitis  PPI as prescribed  Avoid high greasy, minty, chocolate , spicy, acidic, fried foods and alcohol.  Reduce caffeine intake, avoid soda.Recommend to take last meal no later than 6 PM  Avoid tight clothing, chew food thoroughly.    7. Attention deficit hyperactivity disorder (ADHD), predominantly inattentive type  On Vyvanse   Continue to monitor    8. Type 2 diabetes mellitus without complication, without long-term current use of insulin  Diet-controlled  Continue 35 minutes of brisk walking and diabetic     9. URI   10 Cough  Start promethazine as prescribed   Can start  Claritin along with Flonase as prescribed   Flu COVID RSV swab ordered      Medication List with Changes/Refills   Current Medications    (MAGIC MOUTHWASH) 1:1 BENADRYL 12.5MG/5ML LIQ, MYLANTA, LIDOCAINE VISCOUS 2%, NYSTATIN 100,000U, PREDNISOLONE 15MG/5ML, DISTILLED WATER    Swish and swallow 480 mLs every 4 (four) hours as needed (pain).       Start Date: 11/29/2023End Date: --    ALBUTEROL (PROVENTIL/VENTOLIN HFA) 90 MCG/ACTUATION INHALER    Inhale 2 puffs into the lungs every 6 (six) hours as needed for Wheezing. Rescue       Start Date: 11/30/2023End Date: --    ATORVASTATIN (LIPITOR) 40 MG TABLET    Take 1 tablet (40 mg total) by mouth once daily.       Start Date: 9/10/2024 End Date: 9/10/2025    BUPROPION (WELLBUTRIN XL) 150 MG TB24 TABLET    Take 1 tablet (150 mg total) by mouth once daily.       Start Date: 4/24/2024 End Date: --    BUPROPION (WELLBUTRIN XL) 300 MG 24 HR TABLET    Take 300mg  + 150mg (450mg total) by mouth daily.       Start Date: 4/24/2024 End Date: --    CLONAZEPAM (KLONOPIN) 1 MG TABLET    Take 1 tablet (1 mg total) by mouth 3 (three) times daily as needed for Anxiety.       Start Date: 4/24/2024 End Date: --    CLOPIDOGREL (PLAVIX) 75 MG TABLET    Take 1 tablet (75 mg total) by mouth once daily.       Start Date: 10/25/2024End Date: 10/25/2025    FEXOFENADINE (ALLEGRA ALLERGY) 180 MG TABLET    Take 180 mg by mouth once daily.       Start Date: --        End Date: --    FINASTERIDE (PROSCAR) 5 MG TABLET    Take 5 mg by mouth once daily.       Start Date: --        End Date: --    KETOCONAZOLE (NIZORAL) 2 % SHAMPOO    Apply 120 mLs topically twice a week.       Start Date: 8/27/2023 End Date: --    LISDEXAMFETAMINE (VYVANSE) 70 MG CAPSULE    Take 1 capsule (70 mg total) by mouth every morning.       Start Date: 4/27/2024 End Date: --    LISDEXAMFETAMINE (VYVANSE) 70 MG CAPSULE    Take 1 capsule (70 mg total) by mouth every morning.       Start Date: 5/26/2024 End Date: --     MOMETASONE-FORMOTEROL (DULERA) 200-5 MCG/ACTUATION INHALER    Inhale 2 puffs into the lungs 2 (two) times daily. Controller       Start Date: --        End Date: --    OXYBUTYNIN (DITROPAN-XL) 10 MG 24 HR TABLET    Take 10 mg by mouth once daily.       Start Date: --        End Date: --    PANTOPRAZOLE (PROTONIX) 40 MG TABLET    Take 40 mg by mouth once daily.       Start Date: --        End Date: --    PRAMOXINE 1 % FOAM    Place rectally every 10 days.       Start Date: --        End Date: --    VILAZODONE (VIIBRYD) 20 MG TAB    Take 1 tablet (20 mg total) by mouth Daily.       Start Date: 4/24/2024 End Date: --          The patient's Health Maintenance was reviewed and the following appears to be due at this time:   Health Maintenance Due   Topic Date Due    Hepatitis C Screening  Never done    Diabetes Urine Screening  Never done    RSV Vaccine (Age 60+ and Pregnant patients) (1 - Risk 60-74 years 1-dose series) Never done    Hemoglobin A1c  08/23/2024    Lipid Panel  09/20/2024    Mammogram  10/19/2024    Pneumococcal Vaccines (Age 65+) (2 of 2 - PCV) 12/02/2024    Eye Exam  01/03/2025    Foot Exam  02/01/2025         Follow up in about 6 months (around 6/4/2025) for Follow Up DM. 1AW.   In addition to their scheduled follow up, the patient has also been instructed to follow up on as needed basis.     Provided patient with a 5-10 year written screening schedule and personal prevention plan. Recommendations were developed using the USPSTF age appropriate recommendations. Education, counseling, and referrals were provided as needed. After Visit Summary printed and given to patient which includes a list of additional screenings\tests needed.    No future appointments.

## 2024-12-06 ENCOUNTER — TELEPHONE (OUTPATIENT)
Dept: FAMILY MEDICINE | Facility: CLINIC | Age: 64
End: 2024-12-06
Payer: MEDICARE

## 2024-12-09 ENCOUNTER — TELEPHONE (OUTPATIENT)
Dept: FAMILY MEDICINE | Facility: CLINIC | Age: 64
End: 2024-12-09
Payer: MEDICARE

## 2024-12-09 NOTE — TELEPHONE ENCOUNTER
----- Message from Ariel sent at 12/9/2024 12:38 PM CST -----  .Type:  Needs Medical Advice    Who Called: Dr. DONELL Zhang office   Symptoms (please be specific):    How long has patient had these symptoms:    Pharmacy name and phone #:    Would the patient rather a call back or a response via [facility]?   Best Call Back Number: [phone] fax # [phone]  Additional Information: Requested a call back from nurse re: referral sent to them. She needs the dx and the labs faxed to her. Thanks,

## 2024-12-10 ENCOUNTER — LAB VISIT (OUTPATIENT)
Dept: LAB | Facility: HOSPITAL | Age: 64
End: 2024-12-10
Attending: STUDENT IN AN ORGANIZED HEALTH CARE EDUCATION/TRAINING PROGRAM
Payer: MEDICARE

## 2024-12-10 ENCOUNTER — TELEPHONE (OUTPATIENT)
Dept: FAMILY MEDICINE | Facility: CLINIC | Age: 64
End: 2024-12-10
Payer: MEDICARE

## 2024-12-10 DIAGNOSIS — I73.9 PERIPHERAL VASCULAR DISEASE: ICD-10-CM

## 2024-12-10 DIAGNOSIS — R73.03 PREDIABETES: ICD-10-CM

## 2024-12-10 DIAGNOSIS — Z11.59 ENCOUNTER FOR HEPATITIS C SCREENING TEST FOR LOW RISK PATIENT: ICD-10-CM

## 2024-12-10 DIAGNOSIS — Z13.29 SCREENING FOR THYROID DISORDER: ICD-10-CM

## 2024-12-10 DIAGNOSIS — E78.2 MIXED HYPERLIPIDEMIA: ICD-10-CM

## 2024-12-10 DIAGNOSIS — R26.89 BALANCE PROBLEM: ICD-10-CM

## 2024-12-10 DIAGNOSIS — R05.1 ACUTE COUGH: ICD-10-CM

## 2024-12-10 LAB
ALBUMIN SERPL-MCNC: 3.7 G/DL (ref 3.4–4.8)
ALBUMIN/GLOB SERPL: 1.5 RATIO (ref 1.1–2)
ALP SERPL-CCNC: 90 UNIT/L (ref 40–150)
ALT SERPL-CCNC: 20 UNIT/L (ref 0–55)
ANION GAP SERPL CALC-SCNC: 3 MEQ/L
AST SERPL-CCNC: 18 UNIT/L (ref 5–34)
BILIRUB SERPL-MCNC: 0.4 MG/DL
BUN SERPL-MCNC: 17.4 MG/DL (ref 9.8–20.1)
CALCIUM SERPL-MCNC: 8.8 MG/DL (ref 8.4–10.2)
CHLORIDE SERPL-SCNC: 106 MMOL/L (ref 98–107)
CHOLEST SERPL-MCNC: 194 MG/DL
CHOLEST/HDLC SERPL: 3 {RATIO} (ref 0–5)
CO2 SERPL-SCNC: 29 MMOL/L (ref 23–31)
CREAT SERPL-MCNC: 0.9 MG/DL (ref 0.55–1.02)
CREAT UR-MCNC: 95.4 MG/DL (ref 45–106)
CREAT/UREA NIT SERPL: 19
FLUAV AG UPPER RESP QL IA.RAPID: NOT DETECTED
FLUBV AG UPPER RESP QL IA.RAPID: NOT DETECTED
GFR SERPLBLD CREATININE-BSD FMLA CKD-EPI: >60 ML/MIN/1.73/M2
GLOBULIN SER-MCNC: 2.5 GM/DL (ref 2.4–3.5)
GLUCOSE SERPL-MCNC: 87 MG/DL (ref 82–115)
HCV AB SERPL QL IA: NONREACTIVE
HDLC SERPL-MCNC: 60 MG/DL (ref 35–60)
LDLC SERPL CALC-MCNC: 117 MG/DL (ref 50–140)
MICROALBUMIN UR-MCNC: <5 UG/ML
MICROALBUMIN/CREAT RATIO PNL UR: NORMAL
POTASSIUM SERPL-SCNC: 4.5 MMOL/L (ref 3.5–5.1)
PROT SERPL-MCNC: 6.2 GM/DL (ref 5.8–7.6)
RSV A 5' UTR RNA NPH QL NAA+PROBE: NOT DETECTED
SARS-COV-2 RNA RESP QL NAA+PROBE: NOT DETECTED
SODIUM SERPL-SCNC: 138 MMOL/L (ref 136–145)
TRIGL SERPL-MCNC: 83 MG/DL (ref 37–140)
TSH SERPL-ACNC: 1.77 UIU/ML (ref 0.35–4.94)
VIT B12 SERPL-MCNC: 544 PG/ML (ref 213–816)
VLDLC SERPL CALC-MCNC: 17 MG/DL

## 2024-12-10 PROCEDURE — 36415 COLL VENOUS BLD VENIPUNCTURE: CPT

## 2024-12-10 PROCEDURE — 84443 ASSAY THYROID STIM HORMONE: CPT

## 2024-12-10 PROCEDURE — 80053 COMPREHEN METABOLIC PANEL: CPT

## 2024-12-10 PROCEDURE — 86803 HEPATITIS C AB TEST: CPT

## 2024-12-10 PROCEDURE — 82607 VITAMIN B-12: CPT

## 2024-12-10 PROCEDURE — 80061 LIPID PANEL: CPT

## 2024-12-10 PROCEDURE — 82570 ASSAY OF URINE CREATININE: CPT

## 2024-12-10 PROCEDURE — 0241U COVID/RSV/FLU A&B PCR: CPT

## 2024-12-10 NOTE — TELEPHONE ENCOUNTER
----- Message from Ariel sent at 12/10/2024  4:06 PM CST -----  .Type:  Needs Medical Advice    Who Called: Selena   Symptoms (please be specific):    How long has patient had these symptoms:    Pharmacy name and phone #:    Would the patient rather a call back or a response via [facility]?   Best Call Back Number: [phone]  Additional Information: Patient requested a call back from nurse she did lab work this morning and was looking to get the results to that blood work. Please call her back when available. Thanks.

## 2024-12-11 ENCOUNTER — TELEPHONE (OUTPATIENT)
Dept: FAMILY MEDICINE | Facility: CLINIC | Age: 64
End: 2024-12-11
Payer: MEDICARE

## 2024-12-11 NOTE — TELEPHONE ENCOUNTER
----- Message from Maximus sent at 12/11/2024  3:34 PM CST -----  Who Called: Selena Cunningham    Caller is requesting information on test results.    Name of Test (Lab/Mammo/Etc): lab  Date of Test:  1210  Where the test was performed:    Ordering Provider:      Preferred Method of Contact: Phone Call  Patient's Preferred Phone Number on File: [phone]   Best Call Back Number, if different:  Additional Information: pt calls in about results and like to speak with nurse

## 2024-12-19 ENCOUNTER — TELEPHONE (OUTPATIENT)
Dept: FAMILY MEDICINE | Facility: CLINIC | Age: 64
End: 2024-12-19
Payer: MEDICARE

## 2024-12-19 NOTE — TELEPHONE ENCOUNTER
Pt voiced that she will take OTC medication per Dr. Conrad. Pt voiced that she will call back tomorrow.

## 2024-12-19 NOTE — TELEPHONE ENCOUNTER
----- Message from Kimberlyn sent at 12/19/2024  3:48 PM CST -----  Regarding: advice  Who Called: Selena Cunningham    Caller is requesting assistance/information from provider's office.    Symptoms (please be specific):    How long has patient had these symptoms:    List of preferred pharmacies on file (remove unneeded): [unfilled]  If different, enter pharmacy into here including location and phone number:     Preferred Method of Contact: Phone Call    Patient's Preferred Phone Number on File: [phone]   Best Call Back Number, if different:    Additional Information:stated that the cough has gone away but she still have the congestion with mucus that she can't break up. Stated that she has been taking OTC as well and that hasn't help to bring up the mucus. Want to know what was some recommendation for pcp on what to do next. Please advise.

## 2024-12-27 ENCOUNTER — TELEPHONE (OUTPATIENT)
Dept: FAMILY MEDICINE | Facility: CLINIC | Age: 64
End: 2024-12-27
Payer: MEDICARE

## 2024-12-27 NOTE — TELEPHONE ENCOUNTER
----- Message from Jose Angel sent at 12/26/2024  3:14 PM CST -----  .Who Called: Dr. Chelsi Walsh Office    Caller is requesting assistance/information from provider's office.    Symptoms (please be specific):    How long has patient had these symptoms:    List of preferred pharmacies on file (remove unneeded):       Preferred Method of Contact: Phone Call  Patient's Preferred Phone Number on File: [phone]   Best Call Back Number, if different:  Additional Information:  calling in regards to referral, needing additional information

## 2025-02-26 ENCOUNTER — TELEPHONE (OUTPATIENT)
Dept: FAMILY MEDICINE | Facility: CLINIC | Age: 65
End: 2025-02-26
Payer: MEDICARE

## 2025-02-26 DIAGNOSIS — F90.9 ADULT ADHD: ICD-10-CM

## 2025-02-26 RX ORDER — LISDEXAMFETAMINE DIMESYLATE 70 MG/1
70 CAPSULE ORAL EVERY MORNING
Qty: 30 CAPSULE | Refills: 0 | OUTPATIENT
Start: 2025-02-26

## 2025-02-26 RX ORDER — CLONAZEPAM 1 MG/1
1 TABLET ORAL 3 TIMES DAILY PRN
Qty: 85 TABLET | Refills: 0 | OUTPATIENT
Start: 2025-02-26

## 2025-02-26 NOTE — TELEPHONE ENCOUNTER
----- Message from Kanu sent at 2/26/2025  1:37 PM CST -----  .Who Called: Selena CunninghamPatient is returning phone callWho Left Message for Patient: Adalid the patient know what this is regarding?: Medication ListPreferred Method of Contact: Phone CallPatient's Preferred Phone Number on File: [phone] Best Call Back Number, if different:Additional Information: Returning missed call. Please advise, thank you.

## 2025-02-26 NOTE — TELEPHONE ENCOUNTER
----- Message from Mónica sent at 2/26/2025 11:59 AM CST -----  Who Called: Selena Perez is requesting assistance/information from provider's office.Symptoms (please be specific):  How long has patient had these symptoms:  List of preferred pharmacies on file (remove unneeded): [unfilled]If different, enter pharmacy into here including location and phone number: Preferred Method of Contact: Phone CallPatient's Preferred Phone Number on File: [phone] Best Call Back Number, if different:Additional Information: Pt called requesting to speak with nurse about meds list

## 2025-02-26 NOTE — TELEPHONE ENCOUNTER
Called pt back. Pt voiced she needs a refill of Vyvanse, Ambien, and clonazepam. I did not see Ambien any where in her chart. Will see need a Virtual for that?

## 2025-02-28 ENCOUNTER — TELEPHONE (OUTPATIENT)
Dept: FAMILY MEDICINE | Facility: CLINIC | Age: 65
End: 2025-02-28

## 2025-02-28 NOTE — TELEPHONE ENCOUNTER
----- Message from Gabriella sent at 2/28/2025  8:14 AM CST -----  .Who Called: Selena Perez is requesting assistance/information from provider's office.Symptoms (please be specific): pt states that she can not make the appt due money.  How long has patient had these symptoms:  n/aList of preferred pharmacies on file (remove unneeded): [unfilled]If different, enter pharmacy into here including location and phone number: n/aPreferred Method of Contact: Phone CallPatient's Preferred Phone Number on File: [phone] Best Call Back Number, if different:Additional Information:

## 2025-03-10 ENCOUNTER — OFFICE VISIT (OUTPATIENT)
Dept: FAMILY MEDICINE | Facility: CLINIC | Age: 65
End: 2025-03-10
Payer: MEDICARE

## 2025-03-10 VITALS
SYSTOLIC BLOOD PRESSURE: 131 MMHG | TEMPERATURE: 98 F | RESPIRATION RATE: 18 BRPM | DIASTOLIC BLOOD PRESSURE: 85 MMHG | HEART RATE: 73 BPM | WEIGHT: 190 LBS | OXYGEN SATURATION: 99 % | BODY MASS INDEX: 31.65 KG/M2 | HEIGHT: 65 IN

## 2025-03-10 DIAGNOSIS — E11.9 TYPE 2 DIABETES MELLITUS WITHOUT COMPLICATION, WITHOUT LONG-TERM CURRENT USE OF INSULIN: ICD-10-CM

## 2025-03-10 DIAGNOSIS — Z12.31 BREAST CANCER SCREENING BY MAMMOGRAM: ICD-10-CM

## 2025-03-10 DIAGNOSIS — E66.09 CLASS 1 OBESITY DUE TO EXCESS CALORIES WITH SERIOUS COMORBIDITY AND BODY MASS INDEX (BMI) OF 31.0 TO 31.9 IN ADULT: ICD-10-CM

## 2025-03-10 DIAGNOSIS — F90.2 ATTENTION DEFICIT HYPERACTIVITY DISORDER (ADHD), COMBINED TYPE: ICD-10-CM

## 2025-03-10 DIAGNOSIS — E66.811 CLASS 1 OBESITY DUE TO EXCESS CALORIES WITH SERIOUS COMORBIDITY AND BODY MASS INDEX (BMI) OF 31.0 TO 31.9 IN ADULT: ICD-10-CM

## 2025-03-10 DIAGNOSIS — E78.2 MIXED HYPERLIPIDEMIA: ICD-10-CM

## 2025-03-10 PROCEDURE — 3288F FALL RISK ASSESSMENT DOCD: CPT | Mod: CPTII,,, | Performed by: STUDENT IN AN ORGANIZED HEALTH CARE EDUCATION/TRAINING PROGRAM

## 2025-03-10 PROCEDURE — 1126F AMNT PAIN NOTED NONE PRSNT: CPT | Mod: CPTII,,, | Performed by: STUDENT IN AN ORGANIZED HEALTH CARE EDUCATION/TRAINING PROGRAM

## 2025-03-10 PROCEDURE — 1159F MED LIST DOCD IN RCRD: CPT | Mod: CPTII,,, | Performed by: STUDENT IN AN ORGANIZED HEALTH CARE EDUCATION/TRAINING PROGRAM

## 2025-03-10 PROCEDURE — 99214 OFFICE O/P EST MOD 30 MIN: CPT | Mod: ,,, | Performed by: STUDENT IN AN ORGANIZED HEALTH CARE EDUCATION/TRAINING PROGRAM

## 2025-03-10 PROCEDURE — 1160F RVW MEDS BY RX/DR IN RCRD: CPT | Mod: CPTII,,, | Performed by: STUDENT IN AN ORGANIZED HEALTH CARE EDUCATION/TRAINING PROGRAM

## 2025-03-10 PROCEDURE — 1101F PT FALLS ASSESS-DOCD LE1/YR: CPT | Mod: CPTII,,, | Performed by: STUDENT IN AN ORGANIZED HEALTH CARE EDUCATION/TRAINING PROGRAM

## 2025-03-10 PROCEDURE — 3008F BODY MASS INDEX DOCD: CPT | Mod: CPTII,,, | Performed by: STUDENT IN AN ORGANIZED HEALTH CARE EDUCATION/TRAINING PROGRAM

## 2025-03-10 PROCEDURE — 3079F DIAST BP 80-89 MM HG: CPT | Mod: CPTII,,, | Performed by: STUDENT IN AN ORGANIZED HEALTH CARE EDUCATION/TRAINING PROGRAM

## 2025-03-10 PROCEDURE — 3075F SYST BP GE 130 - 139MM HG: CPT | Mod: CPTII,,, | Performed by: STUDENT IN AN ORGANIZED HEALTH CARE EDUCATION/TRAINING PROGRAM

## 2025-03-10 RX ORDER — MINOXIDIL 2.5 MG/1
2.5 TABLET ORAL DAILY
COMMUNITY

## 2025-03-10 RX ORDER — ATORVASTATIN CALCIUM 40 MG/1
40 TABLET, FILM COATED ORAL DAILY
Qty: 90 TABLET | Refills: 3 | Status: SHIPPED | OUTPATIENT
Start: 2025-03-10 | End: 2026-03-10

## 2025-03-10 RX ORDER — TIRZEPATIDE 2.5 MG/.5ML
2.5 INJECTION, SOLUTION SUBCUTANEOUS
Qty: 2 ML | Refills: 0 | Status: SHIPPED | OUTPATIENT
Start: 2025-03-10 | End: 2025-04-09

## 2025-03-10 NOTE — PROGRESS NOTES
Patient ID: [id number]     Chief Complaint: Medication Refill        HPI:      Disclaimer:  This note is prepared using voice recognition software and as such is likely to have errors despite attempts at proofreading. Please contact me for questions.     Selena Cunningham is a 64 y.o. female here today for the following      Acute Issues-  Chronic  Depression   Generalized anxiety disorder   ADHD (attention deficit hyperactivity disorder)  Currently seeing NP  but would like to change and see a psychiatrist  Denies suicidal/homicidal symptoms   Currently on Wellbutrin and Trintellix per her psychiatris  Adderall x 30 mg x bid and is stable     Family history of sudden cardiac death  Father  at age 48   ASx  Reports of having her brothers , sisters and are not having coronary artery disease at earlier age  Cards referral sent, patient did not have a follow up     Hypothyroidism   Stable    Asthma  Well controlled on Ventolin  Never been hospitalized or intubated.     Aphthous ulcer  Seeing Dr. Cisneros, asx now     GERD (gastroesophageal reflux disease)  Has prn cough, is about to get an endoscopy by Dr. Damion Martinez.  On protonix        DM  A1c well controlled with out meds  Protective Sensation (w/ 10 gram monofilament):  Right: Intact  Left: Intact    Visual Inspection:  Normal -  Bilateral    Pedal Pulses:   Right: Present  Left: Present    Posterior Tibialis Pulses:   Right:Present  Left: Present       Chronic Issues-    -------------------------------------    Aphthous ulcer    Asthma    Constipation    Dysphagia    Epigastric pain    Fatigue    Fatigue    Folate deficiency    GERD (gastroesophageal reflux disease)    Macrocytosis    Obesity, unspecified        Past Surgical History:   Procedure Laterality Date    ADENOIDECTOMY      Child    APPENDECTOMY      CATARACT EXTRACTION Bilateral 2023    CHOLECYSTECTOMY      COLONOSCOPY N/A 2022    Procedure: COLONOSCOPY;  Surgeon: Damion  MD Michelle;  Location: [facility] ENDOSCOPY;  Service: Gastroenterology;  Laterality: N/A;    EYE SURGERY  2022 2023 2024    Cadric left cadric right remove ajit left remove growth same jaan graph did not take problem with 2nd graph  wants to again  will see another doctor    HYSTERECTOMY      LAPAROSCOPIC CHOLECYSTECTOMY      NASAL SINUS SURGERY      TONSILLECTOMY         Review of patient's allergies indicates:   Allergen Reactions    Pcn [penicillins] Rash       Current Outpatient Medications   Medication Instructions    (Magic mouthwash) 1:1 Benadryl 12.5mg/5ml liq, mylanta, LIDOcaine viscous 2%, nystatin 100,000u, prednisolone 15mg/5mL, distilled water 480 mLs, Swish & Swallow, Every 4 hours PRN    albuterol (PROVENTIL/VENTOLIN HFA) 90 mcg/actuation inhaler 2 puffs, Inhalation, Every 6 hours PRN, Rescue    atorvastatin (LIPITOR) 40 mg, Oral, Daily    buPROPion (WELLBUTRIN XL) 300 MG 24 hr tablet Take 300mg  + 150mg (450mg total) by mouth daily.    buPROPion (WELLBUTRIN XL) 150 mg, Oral, Daily    clonazePAM (KLONOPIN) 1 mg, Oral, 3 times daily PRN    clopidogreL (PLAVIX) 75 mg, Oral, Daily    finasteride (PROSCAR) 5 mg, Daily    ketoconazole (NIZORAL) 2 % shampoo 120 mLs, Twice weekly    lisdexamfetamine (VYVANSE) 70 mg, Oral, Every morning    lisdexamfetamine (VYVANSE) 70 mg, Oral, Every morning    loratadine (CLARITIN) 10 mg, Oral, Daily    minoxidiL (LONITEN) 2.5 mg, Daily    mometasone-formoterol (DULERA) 200-5 mcg/actuation inhaler 2 puffs, 2 times daily    oxybutynin (DITROPAN-XL) 10 mg, Daily    pantoprazole (PROTONIX) 40 mg, Daily    pramoxine 1 % Foam Every 10 days    vilazodone (VIIBRYD) 20 mg, Oral, Daily       Social History[1]     Family History   Problem Relation Name Age of Onset    Drug abuse Brother Micah & Walt Ragland     Alcohol abuse Brother Micah Schwartz Walt Burtons     Asthma Mother Shaziadeysi Templejaquan Ragland     Diabetes Mother Shazia Debbie Ragland     Kidney disease Mother Shazia Debbie Ragland   "   Early death Father Alex Ragland     Heart disease Father Alex Ragland     Stroke Paternal Grandfather Alex Lewis     Cancer Paternal Aunt Eleonora Bocanegra     Drug abuse Brother Micah Ragland     Early death Brother Micah Ragland     Heart disease Brother Micah Ragland     Drug abuse Brother Walt Ragland     Early death Brother Walt Ragland     Heart disease Brother Walt Ragland     Heart disease Maternal Uncle Elfego Ragland     Learning disabilities Son Marcellus Cunningham     Mental illness Son Marcellus Cunningham     Mental illness Daughter Enid Cunningham         Patient Care Team:  Tennille Conrad MD as PCP - General (Family Medicine)  Ruma Bojorquez MD as Consulting Physician (Ophthalmology)     Subjective:     ROS    See HPI for details    Constitutional: Denies Change in appetite. Denies Chills. Denies Fever. Denies Night sweats.  Respiratory: Denies Cough. Denies Shortness of breath. Denies Shortness of breath with exertion. Denies Wheezing.  Cardiovascular: DeniesChest pain at rest. Denies Chest pain with exertion. Denies Irregular heartbeat. Denies Palpitations. Denies Edema.  Gastrointestinal: Denies Abdominal pain. DeniesDiarrhea. Denies Nausea. Denies Vomiting. Denies Hematemesis or Hematochezia.  Genitourinary: Denies Dysuria. Denies Urinary frequency. Denies Urinary urgency. Denies Blood in urine.  Endocrine: Denies Cold intolerance. Denies Excessive thirst. Denies Heat intolerance. Denies Weight loss. Denies Weight gain.  Musculoskeletal: Denies Painful joints. Denies Weakness.  Integumentary: Denies Rash. Denies Itching. Denies Dry skin.  Neurologic: Denies Dizziness. Denies Fainting. Denies Headache.  Psychiatric: Denies Depression. Denies Anxiety. Denies Suicidal/Homicidal ideations.    All Other ROS: Negative except as stated in HPI.  Objective:     Visit Vitals  /85 (BP Location: Left arm, Patient Position: Sitting)   Pulse 73   Temp 98.2 °F (36.8 °C) (Oral)   Resp 18   Ht 5' 5" (1.651 m)   Wt 86.2 kg (190 lb)   SpO2 " 99%   BMI 31.62 kg/m²       Physical Exam    General: Alert and oriented, No acute distress.  Respiratory: Clear to auscultation bilaterally; No wheezes, rales or rhonchi,Non-labored respirations, Symmetrical chest wall expansion.  Cardiovascular: Regular rate and rhythm, S1/S2 normal, No murmurs, rubs or gallops.  Gastrointestinal: Soft, Non-tender, Non-distended, Normal bowel sounds, No palpable organomegaly.  Musculoskeletal: Normal range of motion.  Extremities: No clubbing, cyanosis or edema  Neurologic: No focal deficits  Psychiatric: Normal interaction, Coherent speech, Euthymic mood, Appropriate affect   Assessment:       ICD-10-CM ICD-9-CM   1. Type 2 diabetes mellitus without complication, without long-term current use of insulin  E11.9 250.00   2. Breast cancer screening by mammogram  Z12.31 V76.12   3. Mixed hyperlipidemia  E78.2 272.2   4. Attention deficit hyperactivity disorder (ADHD), combined type  F90.2 314.01   5. Class 1 obesity due to excess calories with serious comorbidity and body mass index (BMI) of 31.0 to 31.9 in adult  E66.811 278.00    E66.09 V85.31    Z68.31         1. Type 2 diabetes mellitus without complication, without long-term current use of insulin  -     Hemoglobin A1C; Future; Expected date: 03/10/2025  -     tirzepatide (MOUNJARO) 2.5 mg/0.5 mL PnIj; Inject 2.5 mg into the skin every 7 days.  Dispense: 2 mL; Refill: 0  -recommend to continue Rx as prescribed along with 35 minutes of brisk walking and diabetic diet  - Denies of any personal/family history of pancreatic tumors/medullary carcinoma of thyroid/MEN tumors, or any unexplained flushing  -Diet. exercise, and 10% weight loss encouraged.   -Rx trial of GLP1 -inhibitors given with close monitoring to help with insulin resistance/ obesity.  - Will titrate Rx as needed/tolerated until max dose achieved.  -Notify M.D. or ER if symptoms persist or worsen, adverse Rx side effects, temp greater than 100.4, or any acute  illness.  -Discussed in lengths about GLP 1 inhibitors could be teratogenic if pregnant.  Patient voiced understanding    2. Breast cancer screening by mammogram  -     Mammo Digital Screening Bilat w/ Timbo (XPD); Future; Expected date: 03/10/2025    3. Mixed hyperlipidemia  -     atorvastatin (LIPITOR) 40 MG tablet; Take 1 tablet (40 mg total) by mouth once daily.  Dispense: 90 tablet; Refill: 3  Continue statins as prescribed  Start 35 minutes of brisk walking   Recommend to continue Mediterranean diet   Avoid smoking if smoking    4. Attention deficit hyperactivity disorder (ADHD), combined type  -     Ambulatory referral/consult to Psychiatry; Future; Expected date: 03/17/2025    5. Class 1 obesity due to excess calories with serious comorbidity and body mass index (BMI) of 31.0 to 31.9 in adult  Body mass index is 31.62 kg/m².  Goal BMI <30.  Exercise 5 times a week for 30 minutes per day.  Avoid soda, simple sugars, excessive rice, potatoes or bread. Limit fast foods and fried foods.  Choose complex carbs in moderation (example: green vegetables, beans, oatmeal). Eat plenty of fresh fruits and vegetables with lean meats daily.  Do not skip meals. Eat a balanced portion size.  Avoid fad diets. Consider permanent healthy life style changes.         Medication List with Changes/Refills   Current Medications    (MAGIC MOUTHWASH) 1:1 BENADRYL 12.5MG/5ML LIQ, MYLANTA, LIDOCAINE VISCOUS 2%, NYSTATIN 100,000U, PREDNISOLONE 15MG/5ML, DISTILLED WATER    Swish and swallow 480 mLs every 4 (four) hours as needed (pain).       Start Date: 11/29/2023End Date: --    ALBUTEROL (PROVENTIL/VENTOLIN HFA) 90 MCG/ACTUATION INHALER    Inhale 2 puffs into the lungs every 6 (six) hours as needed for Wheezing. Rescue       Start Date: 11/30/2023End Date: --    ATORVASTATIN (LIPITOR) 40 MG TABLET    Take 1 tablet (40 mg total) by mouth once daily.       Start Date: 9/10/2024 End Date: 9/10/2025    BUPROPION (WELLBUTRIN XL) 150 MG TB24  TABLET    Take 1 tablet (150 mg total) by mouth once daily.       Start Date: 4/24/2024 End Date: --    BUPROPION (WELLBUTRIN XL) 300 MG 24 HR TABLET    Take 300mg  + 150mg (450mg total) by mouth daily.       Start Date: 4/24/2024 End Date: --    CLONAZEPAM (KLONOPIN) 1 MG TABLET    Take 1 tablet (1 mg total) by mouth 3 (three) times daily as needed for Anxiety.       Start Date: 4/24/2024 End Date: --    CLOPIDOGREL (PLAVIX) 75 MG TABLET    Take 1 tablet (75 mg total) by mouth once daily.       Start Date: 10/25/2024End Date: 10/25/2025    FINASTERIDE (PROSCAR) 5 MG TABLET    Take 5 mg by mouth once daily.       Start Date: --        End Date: --    KETOCONAZOLE (NIZORAL) 2 % SHAMPOO    Apply 120 mLs topically twice a week.       Start Date: 8/27/2023 End Date: --    LISDEXAMFETAMINE (VYVANSE) 70 MG CAPSULE    Take 1 capsule (70 mg total) by mouth every morning.       Start Date: 4/27/2024 End Date: --    LISDEXAMFETAMINE (VYVANSE) 70 MG CAPSULE    Take 1 capsule (70 mg total) by mouth every morning.       Start Date: 5/26/2024 End Date: --    LORATADINE (CLARITIN) 10 MG TABLET    Take 1 tablet (10 mg total) by mouth once daily.       Start Date: 12/4/2024 End Date: 1/3/2025    MINOXIDIL (LONITEN) 2.5 MG TABLET    Take 2.5 mg by mouth once daily.       Start Date: --        End Date: --    MOMETASONE-FORMOTEROL (DULERA) 200-5 MCG/ACTUATION INHALER    Inhale 2 puffs into the lungs 2 (two) times daily. Controller       Start Date: --        End Date: --    OXYBUTYNIN (DITROPAN-XL) 10 MG 24 HR TABLET    Take 10 mg by mouth once daily.       Start Date: --        End Date: --    PANTOPRAZOLE (PROTONIX) 40 MG TABLET    Take 40 mg by mouth once daily.       Start Date: --        End Date: --    PRAMOXINE 1 % FOAM    Place rectally every 10 days.       Start Date: --        End Date: --    VILAZODONE (VIIBRYD) 20 MG TAB    Take 1 tablet (20 mg total) by mouth Daily.       Start Date: 4/24/2024 End Date: --          Follow  up in about 4 weeks (around 4/7/2025) for Virtual Visit, Diabetes.   In addition to their scheduled follow up, the patient has also been instructed to follow up on as needed basis.     Future Appointments   Date Time Provider Department Center   6/5/2025 10:30 AM Tennille Conrad MD [facility] [place]    12/9/2025 11:00 AM Tennille Conrad MD [facility] [place]           [1]   Social History  Socioeconomic History    Marital status:     Number of children: 2   Tobacco Use    Smoking status: Former     Current packs/day: 0.50     Average packs/day: 0.5 packs/day for 5.0 years (2.5 ttl pk-yrs)     Types: Cigarettes     Passive exposure: Past    Smokeless tobacco: Never    Tobacco comments:     Not sure of dates 16 to 24 quit  2 or 3 times for 6 months   Substance and Sexual Activity    Alcohol use: No    Drug use: No    Sexual activity: Not Currently     Partners: Male     Birth control/protection: Condom, Partner-Vasectomy, None     Comment: Pill short time     Social Drivers of Health     Financial Resource Strain: Medium Risk (9/30/2024)    Overall Financial Resource Strain (CARDIA)     Difficulty of Paying Living Expenses: Somewhat hard   Food Insecurity: Food Insecurity Present (9/30/2024)    Hunger Vital Sign     Worried About Running Out of Food in the Last Year: Often true     Ran Out of Food in the Last Year: Sometimes true   Physical Activity: Sufficiently Active (9/30/2024)    Exercise Vital Sign     Days of Exercise per Week: 5 days     Minutes of Exercise per Session: 60 min   Stress: Stress Concern Present (9/30/2024)    [employer] - Occupational Stress Questionnaire     Feeling of Stress : Rather much   Housing Stability: Unknown (9/30/2024)    Housing Stability Vital Sign     Unable to Pay for Housing in the Last Year: No

## 2025-03-13 RX ORDER — ALBUTEROL SULFATE 90 UG/1
2 INHALANT RESPIRATORY (INHALATION) EVERY 6 HOURS PRN
Qty: 18 G | Refills: 1 | Status: SHIPPED | OUTPATIENT
Start: 2025-03-13

## 2025-03-26 ENCOUNTER — TELEPHONE (OUTPATIENT)
Dept: FAMILY MEDICINE | Facility: CLINIC | Age: 65
End: 2025-03-26
Payer: MEDICARE

## 2025-03-26 DIAGNOSIS — F90.9 ADULT ADHD: ICD-10-CM

## 2025-03-26 DIAGNOSIS — F33.2 SEVERE EPISODE OF RECURRENT MAJOR DEPRESSIVE DISORDER, WITHOUT PSYCHOTIC FEATURES: ICD-10-CM

## 2025-03-26 RX ORDER — LISDEXAMFETAMINE DIMESYLATE 70 MG/1
70 CAPSULE ORAL EVERY MORNING
Qty: 30 CAPSULE | Refills: 0 | OUTPATIENT
Start: 2025-03-26

## 2025-03-26 RX ORDER — CLONAZEPAM 1 MG/1
1 TABLET ORAL 3 TIMES DAILY PRN
Qty: 85 TABLET | Refills: 0 | OUTPATIENT
Start: 2025-03-26

## 2025-03-26 RX ORDER — BUPROPION HYDROCHLORIDE 150 MG/1
150 TABLET ORAL DAILY
Qty: 30 TABLET | Refills: 5 | OUTPATIENT
Start: 2025-03-26

## 2025-03-26 RX ORDER — BUPROPION HYDROCHLORIDE 300 MG/1
TABLET ORAL
Qty: 30 TABLET | Refills: 5 | OUTPATIENT
Start: 2025-03-26

## 2025-03-26 NOTE — TELEPHONE ENCOUNTER
Pt voiced she needs refill on these medications. Her Psych appointment is 04/09/25 @ 3:00 with Dr. Shin Mccain.

## 2025-03-26 NOTE — TELEPHONE ENCOUNTER
----- Message from Shauna sent at 3/26/2025 10:26 AM CDT -----  Regarding: call back  .Who Called: Selena Perez is requesting assistance/information from provider's office.Symptoms (please be specific):  How long has patient had these symptoms:  List of preferred pharmacies on file (remove unneeded): [unfilled]If different, enter pharmacy into here including location and phone number: Preferred Method of Contact: Phone CallPatient's Preferred Phone Number on File: [phone] Best Call Back Number, if different:Additional Information: pt requesting a call back about meds

## 2025-03-27 NOTE — TELEPHONE ENCOUNTER
----- Message from Jose Angel sent at 3/27/2025  9:26 AM CDT -----  .Who Called: Selena Perez is requesting assistance/information from provider's office.Symptoms (please be specific): n/a How long has patient had these symptoms:  n/aList of preferred pharmacies on file (remove unneeded): [unfilled]If different, enter pharmacy into here including location and phone number: n/aPreferred Method of Contact: Phone CallPatient's Preferred Phone Number on File: [phone] Best Call Back Number, if different:Additional Information: pt called for update in regards to medication

## 2025-03-27 NOTE — TELEPHONE ENCOUNTER
"Called pt and voiced you can not refill her medications and pt will need to wait until her psych appt on the 9th of April. Pt was very frustrated with and voiced "when I  end up in the psych hospital its her fault." I voiced I'm really sorry and she hung up on me.  "

## 2025-04-09 ENCOUNTER — TELEPHONE (OUTPATIENT)
Dept: FAMILY MEDICINE | Facility: CLINIC | Age: 65
End: 2025-04-09
Payer: MEDICARE

## (undated) DEVICE — KIT CANIST SUCTION 1200CC

## (undated) DEVICE — UNDERPAD DISPOSABLE 30X30IN

## (undated) DEVICE — COLLECTION SPECIMEN NEPTUNE

## (undated) DEVICE — KIT SURGICAL COLON .25 1.1OZ

## (undated) DEVICE — ADAPTER DUAL NSL LUER M-M 7FT

## (undated) DEVICE — TIP SUCTION YANKAUER

## (undated) DEVICE — SOL IRRI STRL WATER 1000ML